# Patient Record
Sex: MALE | Race: WHITE | Employment: OTHER | URBAN - METROPOLITAN AREA
[De-identification: names, ages, dates, MRNs, and addresses within clinical notes are randomized per-mention and may not be internally consistent; named-entity substitution may affect disease eponyms.]

---

## 2017-03-18 ENCOUNTER — HOSPITAL ENCOUNTER (INPATIENT)
Age: 47
LOS: 1 days | Discharge: ANOTHER HOSPITAL | DRG: 282 | End: 2017-03-19
Attending: EMERGENCY MEDICINE | Admitting: INTERNAL MEDICINE
Payer: OTHER GOVERNMENT

## 2017-03-18 ENCOUNTER — APPOINTMENT (OUTPATIENT)
Dept: GENERAL RADIOLOGY | Age: 47
DRG: 282 | End: 2017-03-18
Attending: EMERGENCY MEDICINE
Payer: OTHER GOVERNMENT

## 2017-03-18 DIAGNOSIS — I20.0 UNSTABLE ANGINA (HCC): Primary | ICD-10-CM

## 2017-03-18 DIAGNOSIS — I21.4 NSTEMI (NON-ST ELEVATED MYOCARDIAL INFARCTION) (HCC): ICD-10-CM

## 2017-03-18 LAB
ALBUMIN SERPL BCP-MCNC: 3.3 G/DL (ref 3.5–5)
ALBUMIN/GLOB SERPL: 0.8 {RATIO} (ref 1.1–2.2)
ALP SERPL-CCNC: 105 U/L (ref 45–117)
ALT SERPL-CCNC: 33 U/L (ref 12–78)
ANION GAP BLD CALC-SCNC: 10 MMOL/L (ref 5–15)
APTT PPP: 28.3 SEC (ref 22.1–32.5)
AST SERPL W P-5'-P-CCNC: 37 U/L (ref 15–37)
BASOPHILS # BLD AUTO: 0 K/UL (ref 0–0.1)
BASOPHILS # BLD: 0 % (ref 0–1)
BILIRUB SERPL-MCNC: 0.2 MG/DL (ref 0.2–1)
BNP SERPL-MCNC: 118 PG/ML (ref 0–100)
BUN SERPL-MCNC: 18 MG/DL (ref 6–20)
BUN/CREAT SERPL: 15 (ref 12–20)
CALCIUM SERPL-MCNC: 8.7 MG/DL (ref 8.5–10.1)
CHLORIDE SERPL-SCNC: 103 MMOL/L (ref 97–108)
CK MB CFR SERPL CALC: 4.1 % (ref 0–2.5)
CK MB SERPL-MCNC: 14.2 NG/ML (ref 5–25)
CK SERPL-CCNC: 345 U/L (ref 39–308)
CO2 SERPL-SCNC: 26 MMOL/L (ref 21–32)
CREAT SERPL-MCNC: 1.23 MG/DL (ref 0.7–1.3)
D DIMER PPP FEU-MCNC: 0.22 MG/L FEU (ref 0–0.65)
EOSINOPHIL # BLD: 0.4 K/UL (ref 0–0.4)
EOSINOPHIL NFR BLD: 3 % (ref 0–7)
ERYTHROCYTE [DISTWIDTH] IN BLOOD BY AUTOMATED COUNT: 13.1 % (ref 11.5–14.5)
GLOBULIN SER CALC-MCNC: 4 G/DL (ref 2–4)
GLUCOSE SERPL-MCNC: 311 MG/DL (ref 65–100)
HCT VFR BLD AUTO: 39.6 % (ref 36.6–50.3)
HGB BLD-MCNC: 13.8 G/DL (ref 12.1–17)
INR PPP: 0.9 (ref 0.9–1.1)
LYMPHOCYTES # BLD AUTO: 35 % (ref 12–49)
LYMPHOCYTES # BLD: 4 K/UL (ref 0.8–3.5)
MCH RBC QN AUTO: 32.3 PG (ref 26–34)
MCHC RBC AUTO-ENTMCNC: 34.8 G/DL (ref 30–36.5)
MCV RBC AUTO: 92.7 FL (ref 80–99)
MONOCYTES # BLD: 0.5 K/UL (ref 0–1)
MONOCYTES NFR BLD AUTO: 5 % (ref 5–13)
NEUTS SEG # BLD: 6.4 K/UL (ref 1.8–8)
NEUTS SEG NFR BLD AUTO: 57 % (ref 32–75)
PLATELET # BLD AUTO: 280 K/UL (ref 150–400)
POTASSIUM SERPL-SCNC: 3.9 MMOL/L (ref 3.5–5.1)
PROT SERPL-MCNC: 7.3 G/DL (ref 6.4–8.2)
PROTHROMBIN TIME: 9.2 SEC (ref 9–11.1)
RBC # BLD AUTO: 4.27 M/UL (ref 4.1–5.7)
SODIUM SERPL-SCNC: 139 MMOL/L (ref 136–145)
THERAPEUTIC RANGE,PTTT: NORMAL SECS (ref 58–77)
TROPONIN I SERPL-MCNC: 3.77 NG/ML
WBC # BLD AUTO: 11.4 K/UL (ref 4.1–11.1)

## 2017-03-18 PROCEDURE — 99285 EMERGENCY DEPT VISIT HI MDM: CPT

## 2017-03-18 PROCEDURE — 85025 COMPLETE CBC W/AUTO DIFF WBC: CPT | Performed by: EMERGENCY MEDICINE

## 2017-03-18 PROCEDURE — 74011250636 HC RX REV CODE- 250/636: Performed by: INTERNAL MEDICINE

## 2017-03-18 PROCEDURE — 80053 COMPREHEN METABOLIC PANEL: CPT | Performed by: EMERGENCY MEDICINE

## 2017-03-18 PROCEDURE — 96375 TX/PRO/DX INJ NEW DRUG ADDON: CPT

## 2017-03-18 PROCEDURE — 74011250636 HC RX REV CODE- 250/636: Performed by: EMERGENCY MEDICINE

## 2017-03-18 PROCEDURE — 74011000250 HC RX REV CODE- 250

## 2017-03-18 PROCEDURE — 74011250637 HC RX REV CODE- 250/637: Performed by: EMERGENCY MEDICINE

## 2017-03-18 PROCEDURE — 85610 PROTHROMBIN TIME: CPT | Performed by: EMERGENCY MEDICINE

## 2017-03-18 PROCEDURE — 71020 XR CHEST PA LAT: CPT

## 2017-03-18 PROCEDURE — 82550 ASSAY OF CK (CPK): CPT | Performed by: EMERGENCY MEDICINE

## 2017-03-18 PROCEDURE — 94762 N-INVAS EAR/PLS OXIMTRY CONT: CPT

## 2017-03-18 PROCEDURE — 96365 THER/PROPH/DIAG IV INF INIT: CPT

## 2017-03-18 PROCEDURE — 93005 ELECTROCARDIOGRAM TRACING: CPT

## 2017-03-18 PROCEDURE — 84484 ASSAY OF TROPONIN QUANT: CPT | Performed by: EMERGENCY MEDICINE

## 2017-03-18 PROCEDURE — 83880 ASSAY OF NATRIURETIC PEPTIDE: CPT | Performed by: EMERGENCY MEDICINE

## 2017-03-18 PROCEDURE — 36415 COLL VENOUS BLD VENIPUNCTURE: CPT | Performed by: EMERGENCY MEDICINE

## 2017-03-18 PROCEDURE — 65620000000 HC RM CCU GENERAL

## 2017-03-18 PROCEDURE — 85379 FIBRIN DEGRADATION QUANT: CPT | Performed by: EMERGENCY MEDICINE

## 2017-03-18 PROCEDURE — 74011000250 HC RX REV CODE- 250: Performed by: EMERGENCY MEDICINE

## 2017-03-18 PROCEDURE — 74011000250 HC RX REV CODE- 250: Performed by: INTERNAL MEDICINE

## 2017-03-18 PROCEDURE — 85730 THROMBOPLASTIN TIME PARTIAL: CPT | Performed by: EMERGENCY MEDICINE

## 2017-03-18 RX ORDER — METOPROLOL TARTRATE 5 MG/5ML
5 INJECTION INTRAVENOUS
Status: COMPLETED | OUTPATIENT
Start: 2017-03-18 | End: 2017-03-18

## 2017-03-18 RX ORDER — MORPHINE SULFATE 2 MG/ML
2 INJECTION, SOLUTION INTRAMUSCULAR; INTRAVENOUS
Status: DISCONTINUED | OUTPATIENT
Start: 2017-03-18 | End: 2017-03-19 | Stop reason: HOSPADM

## 2017-03-18 RX ORDER — ENOXAPARIN SODIUM 100 MG/ML
1 INJECTION SUBCUTANEOUS
Status: DISCONTINUED | OUTPATIENT
Start: 2017-03-18 | End: 2017-03-18

## 2017-03-18 RX ORDER — ENOXAPARIN SODIUM 100 MG/ML
1 INJECTION SUBCUTANEOUS EVERY 12 HOURS
Status: DISCONTINUED | OUTPATIENT
Start: 2017-03-18 | End: 2017-03-19 | Stop reason: HOSPADM

## 2017-03-18 RX ORDER — METOPROLOL TARTRATE 5 MG/5ML
INJECTION INTRAVENOUS
Status: COMPLETED
Start: 2017-03-18 | End: 2017-03-18

## 2017-03-18 RX ORDER — NITROGLYCERIN 20 MG/100ML
5-200 INJECTION INTRAVENOUS
Status: DISCONTINUED | OUTPATIENT
Start: 2017-03-18 | End: 2017-03-18 | Stop reason: SDUPTHER

## 2017-03-18 RX ORDER — ASPIRIN 325 MG
325 TABLET ORAL ONCE
Status: COMPLETED | OUTPATIENT
Start: 2017-03-18 | End: 2017-03-18

## 2017-03-18 RX ORDER — NITROGLYCERIN 20 MG/100ML
5-20 INJECTION INTRAVENOUS
Status: DISCONTINUED | OUTPATIENT
Start: 2017-03-18 | End: 2017-03-19 | Stop reason: HOSPADM

## 2017-03-18 RX ORDER — HYDROMORPHONE HYDROCHLORIDE 1 MG/ML
0.5 INJECTION, SOLUTION INTRAMUSCULAR; INTRAVENOUS; SUBCUTANEOUS
Status: COMPLETED | OUTPATIENT
Start: 2017-03-18 | End: 2017-03-18

## 2017-03-18 RX ADMIN — METOPROLOL TARTRATE 5 MG: 5 INJECTION INTRAVENOUS at 22:48

## 2017-03-18 RX ADMIN — NITROGLYCERIN 10 MCG/MIN: 20 INJECTION INTRAVENOUS at 22:18

## 2017-03-18 RX ADMIN — ASPIRIN 325 MG: 325 TABLET ORAL at 22:18

## 2017-03-18 RX ADMIN — NITROGLYCERIN 30 MCG/MIN: 20 INJECTION INTRAVENOUS at 23:06

## 2017-03-18 RX ADMIN — HYDROMORPHONE HYDROCHLORIDE 0.5 MG: 1 INJECTION, SOLUTION INTRAMUSCULAR; INTRAVENOUS; SUBCUTANEOUS at 22:18

## 2017-03-18 RX ADMIN — Medication 2 MG: at 23:06

## 2017-03-18 NOTE — IP AVS SNAPSHOT
Current Discharge Medication List  
  
ASK your doctor about these medications Dose & Instructions Dispensing Information Comments Morning Noon Evening Bedtime  
 aspirin 81 mg chewable tablet Your last dose was: Your next dose is:    
   
   
 Dose:  81 mg Take 81 mg by mouth daily. Refills:  0  
     
   
   
   
  
 atorvastatin 80 mg tablet Commonly known as:  LIPITOR Your last dose was: Your next dose is:    
   
   
 Dose:  80 mg Take 80 mg by mouth daily. Refills:  0  
     
   
   
   
  
 calcium carbonate 200 mg calcium (500 mg) Chew Commonly known as:  TUMS Your last dose was: Your next dose is:    
   
   
 Dose:  2 Tab Take 2 Tabs by mouth as needed. Refills:  0  
     
   
   
   
  
 carvedilol 12.5 mg tablet Commonly known as:  Ozie Bitter Your last dose was: Your next dose is:    
   
   
 Dose:  12.5 mg Take 12.5 mg by mouth two (2) times daily (with meals). Refills:  0  
     
   
   
   
  
 cholecalciferol 1,000 unit Cap Commonly known as:  VITAMIN D3 Your last dose was: Your next dose is:    
   
   
 Dose:  2 Tab Take 2 Tabs by mouth two (2) times a day. Refills:  0  
     
   
   
   
  
 clopidogrel 75 mg Tab Commonly known as:  PLAVIX Your last dose was: Your next dose is:    
   
   
 Dose:  75 mg Take 75 mg by mouth daily. Refills:  0  
     
   
   
   
  
 cyclobenzaprine 10 mg tablet Commonly known as:  FLEXERIL Your last dose was: Your next dose is:    
   
   
 Dose:  10 mg Take 1 tablet by mouth three (3) times daily as needed for Muscle Spasm(s). Quantity:  20 tablet Refills:  0  
     
   
   
   
  
 gabapentin 600 mg tablet Commonly known as:  NEURONTIN Your last dose was: Your next dose is:    
   
   
 Dose:  600 mg Take 600 mg by mouth three (3) times daily.  Indications: NEUROPATHIC PAIN  
 Refills:  0 HYDROcodone-acetaminophen 5-325 mg per tablet Commonly known as:  Juanetta Rasp Your last dose was: Your next dose is:    
   
   
 Dose:  1 Tab Take 1 tablet by mouth every four (4) hours as needed for Pain. Quantity:  20 tablet Refills:  0  
     
   
   
   
  
 hydrOXYzine pamoate 50 mg capsule Commonly known as:  VISTARIL Your last dose was: Your next dose is:    
   
   
 Dose:  50 mg Take 50 mg by mouth every six (6) hours as needed. Refills:  0  
     
   
   
   
  
 ibuprofen 400 mg tablet Commonly known as:  MOTRIN Your last dose was: Your next dose is:    
   
   
 Dose:  400 mg Take 1 tablet by mouth every six (6) hours as needed for Pain. Quantity:  20 tablet Refills:  0  
     
   
   
   
  
 LANTUS 100 unit/mL injection Generic drug:  insulin glargine Your last dose was: Your next dose is:    
   
   
 Dose:  12 Units 12 Units by SubCUTAneous route daily. Indications: takes in the am  
 Refills:  0  
     
   
   
   
  
 lisinopril 10 mg tablet Commonly known as:  Leonila Parrott Your last dose was: Your next dose is:    
   
   
 Dose:  20 mg Take 20 mg by mouth daily. Refills:  0 LORazepam 2 mg tablet Commonly known as:  ATIVAN Your last dose was: Your next dose is:    
   
   
 Dose:  2 mg Take 1 tablet by mouth every eight (8) hours as needed for Anxiety. Quantity:  30 tablet Refills:  0  
     
   
   
   
  
 metoprolol tartrate 25 mg tablet Commonly known as:  LOPRESSOR Your last dose was: Your next dose is:    
   
   
 Dose:  12.5 mg Take 12.5 mg by mouth two (2) times a day. Refills:  0  
     
   
   
   
  
 miconazole 2 % topical powder Commonly known as:  Thomas Granger Your last dose was: Your next dose is:    
   
   
 Apply  to affected area as needed. Refills:  0 nicotinic acid 500 mg tablet Commonly known as:  NIACIN Your last dose was: Your next dose is:    
   
   
 Dose:  500 mg Take 500 mg by mouth Daily (before breakfast). Take one tab by mouth for 30 days then take 2 tabs by mouth at bedtime Refills:  0  
     
   
   
   
  
 nitroglycerin 0.4 mg SL tablet Commonly known as:  NITROSTAT Your last dose was: Your next dose is:    
   
   
 by SubLINGual route every five (5) minutes as needed. Refills:  0 NORVASC 10 mg tablet Generic drug:  amLODIPine Your last dose was: Your next dose is:    
   
   
 Dose:  5 mg Take 5 mg by mouth daily. Refills:  0 NovoLOG Flexpen 100 unit/mL Inpn Generic drug:  insulin aspart Your last dose was: Your next dose is:    
   
   
 Dose:  12 Units 12 Units by SubCUTAneous route Before breakfast, lunch, and dinner. Indications: Gestational Diabetes Mellitus Refills:  0  
     
   
   
   
  
 omeprazole 20 mg capsule Commonly known as:  PRILOSEC Your last dose was: Your next dose is:    
   
   
 Dose:  20 mg Take 20 mg by mouth daily. Refills:  0  
     
   
   
   
  
 raNITIdine 150 mg tablet Commonly known as:  ZANTAC Your last dose was: Your next dose is:    
   
   
 Dose:  150 mg Take 150 mg by mouth nightly. Refills:  0  
     
   
   
   
  
 rosuvastatin 10 mg tablet Commonly known as:  CRESTOR Your last dose was: Your next dose is:    
   
   
 Dose:  10 mg Take 10 mg by mouth nightly. Refills:  0  
     
   
   
   
  
 simvastatin 40 mg tablet Commonly known as:  ZOCOR Your last dose was: Your next dose is:    
   
   
 Dose:  20 mg Take 20 mg by mouth nightly. Refills:  0

## 2017-03-18 NOTE — IP AVS SNAPSHOT
[address] 
[phone] Patient: Lenoard Najjar 
MRN: [id number] You are allergic to the following Allergen Reactions Tramadol Itching Recent Documentation Height Weight BMI Smoking Status 1.727 m 85.2 kg 28.56 kg/m2 Former Smoker Emergency Contacts Name Discharge Info Relation Home Work Mobile Diann Blue DISCHARGE CAREGIVER [3] Friend [5]   [phone] About your hospitalization You were admitted on:  2017 You last received care in the:  [address] You were discharged on:  2017 Unit phone number:  [phone] Why you were hospitalized Your primary diagnosis was:  Not on File Your diagnoses also included:  Nstemi (Non-St Elevated Myocardial Infarction) (Hcc) Providers Seen During Your Hospitalizations Provider Role Specialty Primary office phone Manolo Snyder MD Attending Provider Emergency Medicine [phone] Timothy Nieto MD Attending Provider Cardiology [phone] Your Primary Care Physician (PCP) Primary Care Physician Office Phone Office Fax NONE ** None ** ** None ** Follow-up Information None Current Discharge Medication List  
  
ASK your doctor about these medications Dose & Instructions Dispensing Information Comments Morning Noon Evening Bedtime  
 aspirin 81 mg chewable tablet Your last dose was: Your next dose is:    
   
   
 Dose:  81 mg Take 81 mg by mouth daily. Refills:  0  
     
   
   
   
  
 atorvastatin 80 mg tablet Commonly known as:  LIPITOR Your last dose was: Your next dose is:    
   
   
 Dose:  80 mg Take 80 mg by mouth daily. Refills:  0  
     
   
   
   
  
 calcium carbonate 200 mg calcium (500 mg) Chew Commonly known as:  TUMS Your last dose was: Your next dose is:    
   
   
 Dose:  2 Tab Take 2 Tabs by mouth as needed. Refills:  0  
     
   
   
   
  
 carvedilol 12.5 mg tablet Commonly known as:  Macel Angelo Your last dose was: Your next dose is:    
   
   
 Dose:  12.5 mg Take 12.5 mg by mouth two (2) times daily (with meals). Refills:  0  
     
   
   
   
  
 cholecalciferol 1,000 unit Cap Commonly known as:  VITAMIN D3 Your last dose was: Your next dose is:    
   
   
 Dose:  2 Tab Take 2 Tabs by mouth two (2) times a day. Refills:  0  
     
   
   
   
  
 clopidogrel 75 mg Tab Commonly known as:  PLAVIX Your last dose was: Your next dose is:    
   
   
 Dose:  75 mg Take 75 mg by mouth daily. Refills:  0  
     
   
   
   
  
 cyclobenzaprine 10 mg tablet Commonly known as:  FLEXERIL Your last dose was: Your next dose is:    
   
   
 Dose:  10 mg Take 1 tablet by mouth three (3) times daily as needed for Muscle Spasm(s). Quantity:  20 tablet Refills:  0  
     
   
   
   
  
 gabapentin 600 mg tablet Commonly known as:  NEURONTIN Your last dose was: Your next dose is:    
   
   
 Dose:  600 mg Take 600 mg by mouth three (3) times daily. Indications: NEUROPATHIC PAIN Refills:  0 HYDROcodone-acetaminophen 5-325 mg per tablet Commonly known as:  Benetta Pock Your last dose was: Your next dose is:    
   
   
 Dose:  1 Tab Take 1 tablet by mouth every four (4) hours as needed for Pain. Quantity:  20 tablet Refills:  0  
     
   
   
   
  
 hydrOXYzine pamoate 50 mg capsule Commonly known as:  VISTARIL Your last dose was: Your next dose is:    
   
   
 Dose:  50 mg Take 50 mg by mouth every six (6) hours as needed. Refills:  0  
     
   
   
   
  
 ibuprofen 400 mg tablet Commonly known as:  MOTRIN Your last dose was: Your next dose is: Dose:  400 mg Take 1 tablet by mouth every six (6) hours as needed for Pain. Quantity:  20 tablet Refills:  0  
     
   
   
   
  
 LANTUS 100 unit/mL injection Generic drug:  insulin glargine Your last dose was: Your next dose is:    
   
   
 Dose:  12 Units 12 Units by SubCUTAneous route daily. Indications: takes in the am  
 Refills:  0  
     
   
   
   
  
 lisinopril 10 mg tablet Commonly known as:  Yung Shutters Your last dose was: Your next dose is:    
   
   
 Dose:  20 mg Take 20 mg by mouth daily. Refills:  0 LORazepam 2 mg tablet Commonly known as:  ATIVAN Your last dose was: Your next dose is:    
   
   
 Dose:  2 mg Take 1 tablet by mouth every eight (8) hours as needed for Anxiety. Quantity:  30 tablet Refills:  0  
     
   
   
   
  
 metoprolol tartrate 25 mg tablet Commonly known as:  LOPRESSOR Your last dose was: Your next dose is:    
   
   
 Dose:  12.5 mg Take 12.5 mg by mouth two (2) times a day. Refills:  0  
     
   
   
   
  
 miconazole 2 % topical powder Commonly known as:  Madzaid Nunn Your last dose was: Your next dose is:    
   
   
 Apply  to affected area as needed. Refills:  0  
     
   
   
   
  
 nicotinic acid 500 mg tablet Commonly known as:  NIACIN Your last dose was: Your next dose is:    
   
   
 Dose:  500 mg Take 500 mg by mouth Daily (before breakfast). Take one tab by mouth for 30 days then take 2 tabs by mouth at bedtime Refills:  0  
     
   
   
   
  
 nitroglycerin 0.4 mg SL tablet Commonly known as:  NITROSTAT Your last dose was: Your next dose is:    
   
   
 by SubLINGual route every five (5) minutes as needed. Refills:  0 NORVASC 10 mg tablet Generic drug:  amLODIPine Your last dose was: Your next dose is:    
   
   
 Dose:  5 mg Take 5 mg by mouth daily. Refills:  0 NovoLOG Flexpen 100 unit/mL Inpn Generic drug:  insulin aspart Your last dose was: Your next dose is:    
   
   
 Dose:  12 Units 12 Units by SubCUTAneous route Before breakfast, lunch, and dinner. Indications: Gestational Diabetes Mellitus Refills:  0  
     
   
   
   
  
 omeprazole 20 mg capsule Commonly known as:  PRILOSEC Your last dose was: Your next dose is:    
   
   
 Dose:  20 mg Take 20 mg by mouth daily. Refills:  0  
     
   
   
   
  
 raNITIdine 150 mg tablet Commonly known as:  ZANTAC Your last dose was: Your next dose is:    
   
   
 Dose:  150 mg Take 150 mg by mouth nightly. Refills:  0  
     
   
   
   
  
 rosuvastatin 10 mg tablet Commonly known as:  CRESTOR Your last dose was: Your next dose is:    
   
   
 Dose:  10 mg Take 10 mg by mouth nightly. Refills:  0  
     
   
   
   
  
 simvastatin 40 mg tablet Commonly known as:  ZOCOR Your last dose was: Your next dose is:    
   
   
 Dose:  20 mg Take 20 mg by mouth nightly. Refills:  0 Discharge Instructions None Discharge Orders None Introducing \[facility]\"" & HEALTH SERVICES! Danisha Delgado introduces [employer] patient portal. Now you can access parts of your medical record, email your doctor's office, and request medication refills online. 1. In your internet browser, go to [email or web address] 2. Click on the First Time User? Click Here link in the Sign In box. You will see the New Member Sign Up page. 3. Enter your [employer] Access Code exactly as it appears below. You will not need to use this code after youve completed the sign-up process. If you do not sign up before the expiration date, you must request a new code. · [employer] Access Code: [email or web address] Expires: 6/16/2017 10:10 PM 
 
 4. Enter the last four digits of your Social Security Number (xxxx) and Date of Birth (mm/dd/yyyy) as indicated and click Submit. You will be taken to the next sign-up page. 5. Create a [employer] ID. This will be your [employer] login ID and cannot be changed, so think of one that is secure and easy to remember. 6. Create a [employer] password. You can change your password at any time. 7. Enter your Password Reset Question and Answer. This can be used at a later time if you forget your password. 8. Enter your e-mail address. You will receive e-mail notification when new information is available in [address] 9. Click Sign Up. You can now view and download portions of your medical record. 10. Click the Download Summary menu link to download a portable copy of your medical information. If you have questions, please visit the Frequently Asked Questions section of the [employer] website. Remember, [employer] is NOT to be used for urgent needs. For medical emergencies, dial 911. Now available from your iPhone and Android! General Information Please provide this summary of care documentation to your next provider. Patient Signature:  ____________________________________________________________ Date:  ____________________________________________________________  
  
[place] [facility] Provider Signature:  ____________________________________________________________ Date:  ____________________________________________________________

## 2017-03-18 NOTE — IP AVS SNAPSHOT
Summary of Care Report The Summary of Care report has been created to help improve care coordination. Users with access to [employer] or [address] (Web-based application) may access additional patient information including the Discharge Summary. If you are not currently a [address] user and need more information, please call the number listed below in the [place] [address] section and ask to be connected with Medical Records. Facility Information Name Address Phone [address] [facility] [address] [phone] Patient Information Patient Name Sex DORY Yo ([id number]) Male 1970 Discharge Information Admitting Provider Service Area Unit Amparo Ley MD / [address] [facility] / [phone] Discharge Provider Discharge Date/Time Discharge Disposition Destination (none) (none) (none) (none) Patient Language Language ENGLISH [13] Hospital Problems as of 3/19/2017  Reviewed: 2012  8:25 AM by Richa Yuan MD  
  
  
  
 Class Noted - Resolved Last Modified POA Active Problems NSTEMI (non-ST elevated myocardial infarction) ([facility] [address]  3/18/2017 - Present 3/18/2017 by Amparo Ley MD Unknown Entered by Amparo Ley MD  
  
Non-Hospital Problems as of 3/19/2017  Reviewed: 2012  8:25 AM by Richa Yuan MD  
  
  
  
 Class Noted - Resolved Last Modified Active Problems Chest pain at rest  2012 - Present 2012 Entered by Richa Yuan MD  
  CAD (coronary artery disease)  2012 - Present 2012 Entered by Richa Yuan MD  
  Overview Signed 2012  9:09 AM by Richa Yuan MD  
    [facility] showed 3v cad and had bare metal stent to distal rca. Normal lvef. HTN (hypertension)  2012 - Present 2012 Entered by Dean Caputo MD  
  Hyperlipidemia  2/22/2012 - Present 2/24/2012 Entered by Dean Caputo MD  
  Unstable angina [facility])  2/22/2012 - Present 2/24/2012 Entered by Dean Caputo MD  
  Acute non Q wave MI (myocardial infarction), initial episode of care [facility])  2/23/2012 - Present 2/24/2012 Entered by Dean Caputo MD  
  
You are allergic to the following Allergen Reactions Tramadol Itching Current Discharge Medication List  
  
ASK your doctor about these medications Dose & Instructions Dispensing Information Comments  
 aspirin 81 mg chewable tablet Dose:  81 mg Take 81 mg by mouth daily. Refills:  0  
   
 atorvastatin 80 mg tablet Commonly known as:  LIPITOR Dose:  80 mg Take 80 mg by mouth daily. Refills:  0  
   
 calcium carbonate 200 mg calcium (500 mg) Chew Commonly known as:  TUMS Dose:  2 Tab Take 2 Tabs by mouth as needed. Refills:  0  
   
 carvedilol 12.5 mg tablet Commonly known as:  Deepak Marshal Dose:  12.5 mg Take 12.5 mg by mouth two (2) times daily (with meals). Refills:  0  
   
 cholecalciferol 1,000 unit Cap Commonly known as:  VITAMIN D3 Dose:  2 Tab Take 2 Tabs by mouth two (2) times a day. Refills:  0  
   
 clopidogrel 75 mg Tab Commonly known as:  PLAVIX Dose:  75 mg Take 75 mg by mouth daily. Refills:  0  
   
 cyclobenzaprine 10 mg tablet Commonly known as:  FLEXERIL Dose:  10 mg Take 1 tablet by mouth three (3) times daily as needed for Muscle Spasm(s). Quantity:  20 tablet Refills:  0  
   
 gabapentin 600 mg tablet Commonly known as:  NEURONTIN Dose:  600 mg Take 600 mg by mouth three (3) times daily. Indications: NEUROPATHIC PAIN Refills:  0 HYDROcodone-acetaminophen 5-325 mg per tablet Commonly known as:  Wayna Glaze Dose:  1 Tab Take 1 tablet by mouth every four (4) hours as needed for Pain. Quantity:  20 tablet Refills:  0 hydrOXYzine pamoate 50 mg capsule Commonly known as:  VISTARIL Dose:  50 mg Take 50 mg by mouth every six (6) hours as needed. Refills:  0  
   
 ibuprofen 400 mg tablet Commonly known as:  MOTRIN Dose:  400 mg Take 1 tablet by mouth every six (6) hours as needed for Pain. Quantity:  20 tablet Refills:  0  
   
 LANTUS 100 unit/mL injection Generic drug:  insulin glargine Dose:  12 Units 12 Units by SubCUTAneous route daily. Indications: takes in the am  
 Refills:  0  
   
 lisinopril 10 mg tablet Commonly known as:  Guanako Bridges Dose:  20 mg Take 20 mg by mouth daily. Refills:  0 LORazepam 2 mg tablet Commonly known as:  ATIVAN Dose:  2 mg Take 1 tablet by mouth every eight (8) hours as needed for Anxiety. Quantity:  30 tablet Refills:  0  
   
 metoprolol tartrate 25 mg tablet Commonly known as:  LOPRESSOR Dose:  12.5 mg Take 12.5 mg by mouth two (2) times a day. Refills:  0  
   
 miconazole 2 % topical powder Commonly known as:  Manjula Bridegroom Apply  to affected area as needed. Refills:  0  
   
 nicotinic acid 500 mg tablet Commonly known as:  NIACIN Dose:  500 mg Take 500 mg by mouth Daily (before breakfast). Take one tab by mouth for 30 days then take 2 tabs by mouth at bedtime Refills:  0  
   
 nitroglycerin 0.4 mg SL tablet Commonly known as:  NITROSTAT  
 by SubLINGual route every five (5) minutes as needed. Refills:  0 NORVASC 10 mg tablet Generic drug:  amLODIPine Dose:  5 mg Take 5 mg by mouth daily. Refills:  0 NovoLOG Flexpen 100 unit/mL Inpn Generic drug:  insulin aspart Dose:  12 Units 12 Units by SubCUTAneous route Before breakfast, lunch, and dinner. Indications: Gestational Diabetes Mellitus Refills:  0  
   
 omeprazole 20 mg capsule Commonly known as:  PRILOSEC Dose:  20 mg Take 20 mg by mouth daily. Refills:  0  
   
 raNITIdine 150 mg tablet Commonly known as:  ZANTAC Dose:  150 mg Take 150 mg by mouth nightly. Refills:  0  
   
 rosuvastatin 10 mg tablet Commonly known as:  CRESTOR Dose:  10 mg Take 10 mg by mouth nightly. Refills:  0  
   
 simvastatin 40 mg tablet Commonly known as:  ZOCOR Dose:  20 mg Take 20 mg by mouth nightly. Refills:  0 Current Immunizations Name Date Influenza Vaccine Whole 12/22/2011 Pneumococcal Vaccine (Unspecified Type) 2/22/2012 TD Vaccine 2/22/2000 Tdap 10/9/2014 Follow-up Information None Discharge Instructions None Chart Review Routing History No Routing History on File

## 2017-03-19 VITALS
HEART RATE: 93 BPM | HEIGHT: 68 IN | WEIGHT: 187.83 LBS | BODY MASS INDEX: 28.47 KG/M2 | OXYGEN SATURATION: 95 % | RESPIRATION RATE: 17 BRPM | TEMPERATURE: 98 F | SYSTOLIC BLOOD PRESSURE: 159 MMHG | DIASTOLIC BLOOD PRESSURE: 89 MMHG

## 2017-03-19 LAB
ATRIAL RATE: 89 BPM
CALCULATED P AXIS, ECG09: -18 DEGREES
CALCULATED R AXIS, ECG10: 73 DEGREES
CALCULATED T AXIS, ECG11: 170 DEGREES
DIAGNOSIS, 93000: NORMAL
GLUCOSE BLD STRIP.AUTO-MCNC: 317 MG/DL (ref 65–100)
GLUCOSE BLD STRIP.AUTO-MCNC: 340 MG/DL (ref 65–100)
P-R INTERVAL, ECG05: 160 MS
Q-T INTERVAL, ECG07: 380 MS
QRS DURATION, ECG06: 82 MS
QTC CALCULATION (BEZET), ECG08: 462 MS
SERVICE CMNT-IMP: ABNORMAL
SERVICE CMNT-IMP: ABNORMAL
TROPONIN I SERPL-MCNC: 3.45 NG/ML
TROPONIN I SERPL-MCNC: 3.59 NG/ML
VENTRICULAR RATE, ECG03: 89 BPM

## 2017-03-19 PROCEDURE — 36415 COLL VENOUS BLD VENIPUNCTURE: CPT | Performed by: INTERNAL MEDICINE

## 2017-03-19 PROCEDURE — 74011250636 HC RX REV CODE- 250/636: Performed by: INTERNAL MEDICINE

## 2017-03-19 PROCEDURE — 82962 GLUCOSE BLOOD TEST: CPT

## 2017-03-19 PROCEDURE — 84484 ASSAY OF TROPONIN QUANT: CPT | Performed by: INTERNAL MEDICINE

## 2017-03-19 PROCEDURE — 93005 ELECTROCARDIOGRAM TRACING: CPT

## 2017-03-19 PROCEDURE — 74011250637 HC RX REV CODE- 250/637: Performed by: INTERNAL MEDICINE

## 2017-03-19 RX ORDER — GUAIFENESIN 100 MG/5ML
81 LIQUID (ML) ORAL DAILY
Status: DISCONTINUED | OUTPATIENT
Start: 2017-03-19 | End: 2017-03-19 | Stop reason: HOSPADM

## 2017-03-19 RX ORDER — AMLODIPINE BESYLATE 5 MG/1
5 TABLET ORAL DAILY
Status: DISCONTINUED | OUTPATIENT
Start: 2017-03-19 | End: 2017-03-19

## 2017-03-19 RX ORDER — CYCLOBENZAPRINE HCL 10 MG
10 TABLET ORAL
Status: DISCONTINUED | OUTPATIENT
Start: 2017-03-19 | End: 2017-03-19 | Stop reason: HOSPADM

## 2017-03-19 RX ORDER — CALCIUM CARBONATE 200(500)MG
400 TABLET,CHEWABLE ORAL
Status: DISCONTINUED | OUTPATIENT
Start: 2017-03-19 | End: 2017-03-19

## 2017-03-19 RX ORDER — SODIUM CHLORIDE 0.9 % (FLUSH) 0.9 %
5-10 SYRINGE (ML) INJECTION EVERY 8 HOURS
Status: DISCONTINUED | OUTPATIENT
Start: 2017-03-19 | End: 2017-03-19 | Stop reason: HOSPADM

## 2017-03-19 RX ORDER — INSULIN ASPART 100 [IU]/ML
18 INJECTION, SOLUTION INTRAVENOUS; SUBCUTANEOUS
COMMUNITY

## 2017-03-19 RX ORDER — NITROGLYCERIN 0.4 MG/1
0.4 TABLET SUBLINGUAL
Status: DISCONTINUED | OUTPATIENT
Start: 2017-03-19 | End: 2017-03-19 | Stop reason: HOSPADM

## 2017-03-19 RX ORDER — LISINOPRIL 5 MG/1
5 TABLET ORAL DAILY
Status: DISCONTINUED | OUTPATIENT
Start: 2017-03-19 | End: 2017-03-19

## 2017-03-19 RX ORDER — MORPHINE SULFATE 2 MG/ML
2 INJECTION, SOLUTION INTRAMUSCULAR; INTRAVENOUS ONCE
Status: COMPLETED | OUTPATIENT
Start: 2017-03-19 | End: 2017-03-19

## 2017-03-19 RX ORDER — IBUPROFEN 400 MG/1
400 TABLET ORAL
Status: DISCONTINUED | OUTPATIENT
Start: 2017-03-19 | End: 2017-03-19 | Stop reason: HOSPADM

## 2017-03-19 RX ORDER — MELATONIN
2000 2 TIMES DAILY
Status: DISCONTINUED | OUTPATIENT
Start: 2017-03-19 | End: 2017-03-19

## 2017-03-19 RX ORDER — FAMOTIDINE 20 MG/1
20 TABLET, FILM COATED ORAL
Status: DISCONTINUED | OUTPATIENT
Start: 2017-03-19 | End: 2017-03-19 | Stop reason: HOSPADM

## 2017-03-19 RX ORDER — INSULIN GLARGINE 100 [IU]/ML
22 INJECTION, SOLUTION SUBCUTANEOUS DAILY
Status: ON HOLD | COMMUNITY
End: 2020-03-13

## 2017-03-19 RX ORDER — LORAZEPAM 1 MG/1
2 TABLET ORAL
Status: DISCONTINUED | OUTPATIENT
Start: 2017-03-19 | End: 2017-03-19 | Stop reason: HOSPADM

## 2017-03-19 RX ORDER — HYDROXYZINE 25 MG/1
50 TABLET, FILM COATED ORAL
Status: DISCONTINUED | OUTPATIENT
Start: 2017-03-19 | End: 2017-03-19 | Stop reason: HOSPADM

## 2017-03-19 RX ORDER — SIMVASTATIN 20 MG/1
20 TABLET, FILM COATED ORAL
Status: DISCONTINUED | OUTPATIENT
Start: 2017-03-19 | End: 2017-03-19

## 2017-03-19 RX ORDER — LISINOPRIL 10 MG/1
10 TABLET ORAL DAILY
Status: DISCONTINUED | OUTPATIENT
Start: 2017-03-19 | End: 2017-03-19

## 2017-03-19 RX ORDER — CARVEDILOL 12.5 MG/1
25 TABLET ORAL 2 TIMES DAILY WITH MEALS
Status: ON HOLD | COMMUNITY
End: 2020-03-13

## 2017-03-19 RX ORDER — PANTOPRAZOLE SODIUM 40 MG/1
40 TABLET, DELAYED RELEASE ORAL DAILY
Status: DISCONTINUED | OUTPATIENT
Start: 2017-03-19 | End: 2017-03-19

## 2017-03-19 RX ORDER — ROSUVASTATIN CALCIUM 10 MG/1
10 TABLET, COATED ORAL
Status: DISCONTINUED | OUTPATIENT
Start: 2017-03-19 | End: 2017-03-19

## 2017-03-19 RX ORDER — INSULIN LISPRO 100 [IU]/ML
INJECTION, SOLUTION INTRAVENOUS; SUBCUTANEOUS
Status: DISCONTINUED | OUTPATIENT
Start: 2017-03-19 | End: 2017-03-19 | Stop reason: HOSPADM

## 2017-03-19 RX ORDER — HYDROCODONE BITARTRATE AND ACETAMINOPHEN 5; 325 MG/1; MG/1
1 TABLET ORAL
Status: DISCONTINUED | OUTPATIENT
Start: 2017-03-19 | End: 2017-03-19 | Stop reason: HOSPADM

## 2017-03-19 RX ORDER — DEXTROSE 50 % IN WATER (D50W) INTRAVENOUS SYRINGE
12.5-25 AS NEEDED
Status: DISCONTINUED | OUTPATIENT
Start: 2017-03-19 | End: 2017-03-19 | Stop reason: HOSPADM

## 2017-03-19 RX ORDER — ATORVASTATIN CALCIUM 80 MG/1
80 TABLET, FILM COATED ORAL DAILY
COMMUNITY
End: 2018-02-06

## 2017-03-19 RX ORDER — CLOPIDOGREL BISULFATE 75 MG/1
75 TABLET ORAL DAILY
Status: DISCONTINUED | OUTPATIENT
Start: 2017-03-19 | End: 2017-03-19 | Stop reason: HOSPADM

## 2017-03-19 RX ORDER — MAGNESIUM SULFATE 100 %
4 CRYSTALS MISCELLANEOUS AS NEEDED
Status: DISCONTINUED | OUTPATIENT
Start: 2017-03-19 | End: 2017-03-19 | Stop reason: HOSPADM

## 2017-03-19 RX ORDER — LANOLIN ALCOHOL/MO/W.PET/CERES
500 CREAM (GRAM) TOPICAL
Status: DISCONTINUED | OUTPATIENT
Start: 2017-03-19 | End: 2017-03-19

## 2017-03-19 RX ORDER — INSULIN GLARGINE 100 [IU]/ML
12 INJECTION, SOLUTION SUBCUTANEOUS DAILY
Status: DISCONTINUED | OUTPATIENT
Start: 2017-03-19 | End: 2017-03-19 | Stop reason: HOSPADM

## 2017-03-19 RX ORDER — SODIUM CHLORIDE 0.9 % (FLUSH) 0.9 %
5-10 SYRINGE (ML) INJECTION AS NEEDED
Status: DISCONTINUED | OUTPATIENT
Start: 2017-03-19 | End: 2017-03-19 | Stop reason: HOSPADM

## 2017-03-19 RX ORDER — METOPROLOL TARTRATE 25 MG/1
12.5 TABLET, FILM COATED ORAL 2 TIMES DAILY
Status: DISCONTINUED | OUTPATIENT
Start: 2017-03-19 | End: 2017-03-19

## 2017-03-19 RX ORDER — GABAPENTIN 600 MG/1
600 TABLET ORAL 3 TIMES DAILY
Status: ON HOLD | COMMUNITY
End: 2020-03-13

## 2017-03-19 RX ORDER — LISINOPRIL 10 MG/1
10 TABLET ORAL DAILY
Status: DISCONTINUED | OUTPATIENT
Start: 2017-03-19 | End: 2017-03-19 | Stop reason: HOSPADM

## 2017-03-19 RX ORDER — ATORVASTATIN CALCIUM 40 MG/1
80 TABLET, FILM COATED ORAL
Status: DISCONTINUED | OUTPATIENT
Start: 2017-03-19 | End: 2017-03-19 | Stop reason: HOSPADM

## 2017-03-19 RX ADMIN — INSULIN GLARGINE 12 UNITS: 100 INJECTION, SOLUTION SUBCUTANEOUS at 08:19

## 2017-03-19 RX ADMIN — CLOPIDOGREL BISULFATE 75 MG: 75 TABLET ORAL at 08:44

## 2017-03-19 RX ADMIN — Medication 2 MG: at 06:54

## 2017-03-19 RX ADMIN — ASPIRIN 81 MG CHEWABLE TABLET 81 MG: 81 TABLET CHEWABLE at 08:43

## 2017-03-19 RX ADMIN — MORPHINE SULFATE 2 MG: 2 INJECTION, SOLUTION INTRAMUSCULAR; INTRAVENOUS at 07:57

## 2017-03-19 RX ADMIN — INSULIN LISPRO 7 UNITS: 100 INJECTION, SOLUTION INTRAVENOUS; SUBCUTANEOUS at 08:44

## 2017-03-19 RX ADMIN — FAMOTIDINE 20 MG: 20 TABLET, FILM COATED ORAL at 01:37

## 2017-03-19 RX ADMIN — LISINOPRIL 10 MG: 10 TABLET ORAL at 08:44

## 2017-03-19 RX ADMIN — ENOXAPARIN SODIUM 90 MG: 100 INJECTION SUBCUTANEOUS at 11:58

## 2017-03-19 RX ADMIN — INSULIN LISPRO 7 UNITS: 100 INJECTION, SOLUTION INTRAVENOUS; SUBCUTANEOUS at 11:58

## 2017-03-19 RX ADMIN — Medication 2 MG: at 12:02

## 2017-03-19 RX ADMIN — Medication 10 ML: at 07:59

## 2017-03-19 RX ADMIN — LORAZEPAM 2 MG: 1 TABLET ORAL at 09:24

## 2017-03-19 RX ADMIN — NITROGLYCERIN 2 INCH: 20 OINTMENT TOPICAL at 08:45

## 2017-03-19 RX ADMIN — ENOXAPARIN SODIUM 90 MG: 100 INJECTION SUBCUTANEOUS at 00:53

## 2017-03-19 RX ADMIN — HYDROCODONE BITARTRATE AND ACETAMINOPHEN 1 TABLET: 5; 325 TABLET ORAL at 01:37

## 2017-03-19 NOTE — PROGRESS NOTES
Cardiology Progress Note                                        Admit Date: 3/18/2017    Assessment/Plan:   NSTEMI; troponin is coming down slightly; clinically his CP is better; he needs cardiac cath in am; he is a  and we could conceivably transfer pt to [facility]  CAD; s/p CABG; suspect graft closure    Miguelina Scott is a 40 y.o. male with     PROBLEM LIST:  Patient Active Problem List    Diagnosis Date Noted    NSTEMI (non-ST elevated myocardial infarction) ([facility] 03/18/2017    Acute non Q wave MI (myocardial infarction), initial episode of care ([facility] 02/23/2012    Chest pain at rest 02/22/2012    CAD (coronary artery disease) 02/22/2012    HTN (hypertension) 02/22/2012    Hyperlipidemia 02/22/2012    Unstable angina ([facility] 02/22/2012         Subjective:     Miguelina Scott reports none. Visit Vitals    BP 97/61 (BP 1 Location: Left arm, BP Patient Position: At rest)    Pulse 78    Temp 98 °F (36.7 °C)    Resp 19    Ht 5' 8\" (1.727 m)    Wt 85.2 kg (187 lb 13.3 oz)    SpO2 95%    BMI 28.56 kg/m2       Intake/Output Summary (Last 24 hours) at 03/19/17 1937  Last data filed at 03/19/17 0600   Gross per 24 hour   Intake            162.4 ml   Output                0 ml   Net            162.4 ml       Objective:      Physical Exam:  HEENT: Perrla, EOMI  Neck: No JVD,  No thyroidmegaly  Resp: CTA bilaterally;  No wheezes or rales  CV: RRR s1s2 No murmur no s3  Abd:Soft, Nontender  Ext: No edema  Neuro: Alert and oriented; Nonfocal  Skin: Warm, Dry, Intact  Pulses: 2+ DP/PT/Rad      Telemetry: normal sinus rhythm    Current Facility-Administered Medications   Medication Dose Route Frequency    pantoprazole (PROTONIX) tablet 40 mg  40 mg Oral DAILY    simvastatin (ZOCOR) tablet 20 mg  20 mg Oral QHS    clopidogrel (PLAVIX) tablet 75 mg  75 mg Oral DAILY    hydrOXYzine HCl (ATARAX) tablet 50 mg  50 mg Oral Q6H PRN    metoprolol tartrate (LOPRESSOR) tablet 12.5 mg  12.5 mg Oral BID    cholecalciferol (VITAMIN D3) tablet 2,000 Units  2,000 Units Oral BID    . PHARMACY TO SUBSTITUTE PER PROTOCOL    Per Protocol    famotidine (PEPCID) tablet 20 mg  20 mg Oral QHS    niacin SR (NIACIN SR) capsule 500 mg  500 mg Oral ACB    amLODIPine (NORVASC) tablet 5 mg  5 mg Oral DAILY    aspirin chewable tablet 81 mg  81 mg Oral DAILY    nitroglycerin (NITROSTAT) tablet 0.4 mg  0.4 mg SubLINGual Q5MIN PRN    calcium carbonate (TUMS) chewable tablet 400 mg [elemental]  400 mg Oral TID WITH MEALS    HYDROcodone-acetaminophen (NORCO) 5-325 mg per tablet 1 Tab  1 Tab Oral Q4H PRN    ibuprofen (MOTRIN) tablet 400 mg  400 mg Oral Q6H PRN    cyclobenzaprine (FLEXERIL) tablet 10 mg  10 mg Oral TID PRN    LORazepam (ATIVAN) tablet 2 mg  2 mg Oral Q8H PRN    lisinopril (PRINIVIL, ZESTRIL) tablet 5 mg  5 mg Oral DAILY    nitroglycerin (Tridil) 200 mcg/ml infusion  5-20 mcg/min IntraVENous TITRATE    enoxaparin (LOVENOX) injection 90 mg  1 mg/kg SubCUTAneous Q12H    morphine injection 2 mg  2 mg IntraVENous Q4H PRN         Data Review:   Labs:    Recent Results (from the past 24 hour(s))   TROPONIN I    Collection Time: 03/18/17 10:00 PM   Result Value Ref Range    Troponin-I, Qt. 3.77 (H) <0.05 ng/mL   CBC WITH AUTOMATED DIFF    Collection Time: 03/18/17 10:00 PM   Result Value Ref Range    WBC 11.4 (H) 4.1 - 11.1 K/uL    RBC 4.27 4. 10 - 5.70 M/uL    HGB 13.8 12.1 - 17.0 g/dL    HCT 39.6 36.6 - 50.3 %    MCV 92.7 80.0 - 99.0 FL    MCH 32.3 26.0 - 34.0 PG    MCHC 34.8 30.0 - 36.5 g/dL    RDW 13.1 11.5 - 14.5 %    PLATELET 619 461 - 813 K/uL    NEUTROPHILS 57 32 - 75 %    LYMPHOCYTES 35 12 - 49 %    MONOCYTES 5 5 - 13 %    EOSINOPHILS 3 0 - 7 %    BASOPHILS 0 0 - 1 %    ABS. NEUTROPHILS 6.4 1.8 - 8.0 K/UL    ABS. LYMPHOCYTES 4.0 (H) 0.8 - 3.5 K/UL    ABS. MONOCYTES 0.5 0.0 - 1.0 K/UL    ABS. EOSINOPHILS 0.4 0.0 - 0.4 K/UL    ABS.  BASOPHILS 0.0 0.0 - 0.1 K/UL   METABOLIC PANEL, COMPREHENSIVE    Collection Time: 03/18/17 10:00 PM Result Value Ref Range    Sodium 139 136 - 145 mmol/L    Potassium 3.9 3.5 - 5.1 mmol/L    Chloride 103 97 - 108 mmol/L    CO2 26 21 - 32 mmol/L    Anion gap 10 5 - 15 mmol/L    Glucose 311 (H) 65 - 100 mg/dL    BUN 18 6 - 20 MG/DL    Creatinine 1.23 0.70 - 1.30 MG/DL    BUN/Creatinine ratio 15 12 - 20      GFR est AA >60 >60 ml/min/1.73m2    GFR est non-AA >60 >60 ml/min/1.73m2    Calcium 8.7 8.5 - 10.1 MG/DL    Bilirubin, total 0.2 0.2 - 1.0 MG/DL    ALT (SGPT) 33 12 - 78 U/L    AST (SGOT) 37 15 - 37 U/L    Alk.  phosphatase 105 45 - 117 U/L    Protein, total 7.3 6.4 - 8.2 g/dL    Albumin 3.3 (L) 3.5 - 5.0 g/dL    Globulin 4.0 2.0 - 4.0 g/dL    A-G Ratio 0.8 (L) 1.1 - 2.2     BNP    Collection Time: 03/18/17 10:00 PM   Result Value Ref Range     (H) 0 - 100 pg/mL   CK W/ CKMB & INDEX    Collection Time: 03/18/17 10:00 PM   Result Value Ref Range     (H) 39 - 308 U/L    CK - MB 14.2 (H) <3.6 NG/ML    CK-MB Index 4.1 (H) 0 - 2.5     D DIMER    Collection Time: 03/18/17 10:00 PM   Result Value Ref Range    D-dimer 0.22 0.00 - 0.65 mg/L FEU   PROTHROMBIN TIME + INR    Collection Time: 03/18/17 10:00 PM   Result Value Ref Range    INR 0.9 0.9 - 1.1      Prothrombin time 9.2 9.0 - 11.1 sec   PTT    Collection Time: 03/18/17 10:00 PM   Result Value Ref Range    aPTT 28.3 22.1 - 32.5 sec    aPTT, therapeutic range     58.0 - 77.0 SECS   EKG, 12 LEAD, INITIAL    Collection Time: 03/18/17 10:00 PM   Result Value Ref Range    Ventricular Rate 89 BPM    Atrial Rate 89 BPM    P-R Interval 160 ms    QRS Duration 82 ms    Q-T Interval 380 ms    QTC Calculation (Bezet) 462 ms    Calculated P Axis -18 degrees    Calculated R Axis 73 degrees    Calculated T Axis 170 degrees    Diagnosis       Normal sinus rhythm  Inferior infarct , age undetermined  ST & T wave abnormality, consider lateral ischemia  When compared with ECG of 08-NOV-2014 07:39,  Inferior infarct is now present  ST elevation now present in Inferior leads  T wave inversion now evident in Lateral leads     TROPONIN I    Collection Time: 03/19/17  1:32 AM   Result Value Ref Range    Troponin-I, Qt. 3.59 (H) <0.05 ng/mL

## 2017-03-19 NOTE — ED TRIAGE NOTES
TRIAGE NOTE: Pt arrives via EMS for c/o CP that began 2 days ago. Pt reports taking 6 nitro tablets tonight with minimal relief but the pain always returns. Also took 325mg aspirin. Pt reports dizziness, incontinence and shaking during episode of chest pain last night.

## 2017-03-19 NOTE — PROGRESS NOTES
This 39 yo WM  who presented to us with NSTEMI with ongoing CP and elevated BPs. His first troponin was 3.77. His CP eased considerably on IV NTG drip and increased BB and Lovenox. His troponins came down to 3.45 this am. His ekgs continue to show midl ST depression in inferolateral leads. His vital signs have been very stable and no further CP this am    His past cardiac data include CAD, s/p 3 vs CABG in 2011 at [facility] and stent in one of the grafts in 2012. Last echo showed preserved EF here. He will be transferred to [facility] for further care and probably needs to undergo a cardiac cath.

## 2017-03-19 NOTE — PROGRESS NOTES
TRANSFER - IN REPORT:  Verbal report received from Jing on Sherrill Flow  being received from ED for routine progression of care    Report consisted of patients Situation, Background, Assessment and   Recommendations(SBAR). Information from the following report(s) SBAR, Kardex, ED Summary, OR Summary, Procedure Summary, Intake/Output, MAR, Accordion, Recent Results and Med Rec Status was reviewed with the receiving nurse. Opportunity for questions and clarification was provided. Assessment completed upon patients arrival to unit and care assumed. Primary Nurse Aracelis Beck RN and Carlene Corona RN performed a dual skin assessment on this patient No impairment noted  Marquis score is 23      0115 patient to CCU 19 on nitro infusing at 30 mcg/min. Provided CHG bath, completed database, labs drawn, PTA medications were verified (orders were placed based on old mediations from 2012-will need to reconcile mediation in the am). Patient is normally treated at the [facility]. He called his MD today and reported chest pain. Was instructed to go to ER then transfer to [facility]. Advised I will pass along the info in the morning to determine plan of care. provided teach info regarding MI, CHF, DM. Patient needs reinforcement. 0140-Patient c/o chest and back pain-patient asked for morphine-provided norco and increased nitro drip to 50 mcg/min  0220-patient sleeping VSS  0400-no issues  0600-no issues    Bedside shift change report given to Gina  (oncoming nurse) by Johan Ceballos (offgoing nurse). Report included the following information SBAR, Kardex, ED Summary, OR Summary, Procedure Summary, Intake/Output, MAR, Accordion, Recent Results and Med Rec Status.

## 2017-03-19 NOTE — PROGRESS NOTES
1045: Waiting to hear back from [facility] regarding bed placement. Contact is Marco, [phone]. Dr. Kevyn Arce waiting to receive call from accepting cardiologist at [facility].  [phone]: Dr. Rubia Chowdhury, second year cardiology fellow, will be accepting physician at [facility]. He has spoken to Dr. Kevyn Arce. Awaiting bed assignment and transport set-up.

## 2017-03-19 NOTE — PROGRESS NOTES
Problem: Heart Failure: Day 1  Goal: Activity/Safety  Outcome: Progressing Towards Goal  Bedrest             Goal: Consults, if ordered  Outcome: Progressing Towards Goal  Consult to cardiology and troponin  Goal: Nutrition/Diet  Outcome: Progressing Towards Goal  NPO will verify if patient is going to cath lab in the am or transferring to the [facility]

## 2017-03-19 NOTE — PROGRESS NOTES
Received consult for pt request to transfer to [facility].  CM spok [facility] transfer center staff, phone [phone], Ezequiel Ramirez and received paperwork to complete. CM completed paperwork, met with pt at bedside to review, and pt signed. Also faxed completed paperwork and clinicals to [facility] transfer center fax [phone]. Process is for Dr. Osiris Rodriguez to receive call from [facility] physician regarding clinical needs. Pt in CCU, but tele overflow bed. Will follow. LEVAR Fairbanks    Care Management Interventions  Transition of Care Consult (CM Consult): Other (transfer to [facility])      8:40am  Yudy Garcia has received paperwork. CM confirmed with Dr. Osiris Rodriguez pt still requires ICU bed. LEVAR Fairbanks    12:30pm  Received call from Beaman at [place] confirming acceptance for transfer and bed availability for today. She is sending [facility] ambulance to transport pt from CCU to [facility] [place].  Pt aware and in agreement. EMTALA, summary, kardex, and MAR to travel with pt.     LEVAR Fairbanks

## 2017-03-19 NOTE — ED PROVIDER NOTES
HPI Comments: 40 y.o. male with past medical history significant for MI, Endocarditis, DM, HTN who presents from home via EMS with chief complaint of chest pain. Pt reports 2 day hx of \"aching\" chest pain. He describes pain as constant with intermittent \"severe\" exacerbations. Pain also exacerbated with exertion. He notes the use of Nitro throughout the last two days without significant relief. Pt also reports while taking out the trash yesterday, he had a severe episode of chest pain at which time she became acutely dizzy, incontinent, \"I didn't know where I was\" and fell to the ground. No further symptoms after this episode. Pt denies injury from this fall. Pt further denies SOB, diaphoresis, nausea, vomiting, diarrhea, constipation, fever, abdominal pain, LOC, dizziness, urinary symptoms, neck pain or back pain. There are no other acute medical concerns at this time. Old chart review: Pt admitted 2/23/12 for acute coronary syndrome. Pt with reporte chest pain at rest. \"Small non-q MI with preserved lV EF stable on IV medications\". Pt transfered to the [place]. Social hx: Previous smoker (\"less than 1 ppd\")  Cardiology: Followed by Cardiolgy at the [facility] \"Dr. Johanna Rock"     Note written by Armida Zepeda, as dictated by Renee Arguello MD 10:36 PM      The history is provided by the patient. No  was used.         Past Medical History:   Diagnosis Date    CAD (coronary artery disease)     Diabetes ([address]     \"prediabetes\" Was suppos to go clinic at  [facility] [address] Endocarditis     Hypertension     [facility] infarction) [facility]        Past Surgical History:   Procedure Laterality Date    CARDIAC SURG PROCEDURE UNLIST      cath with stent placement feb 2012    CARDIAC SURG PROCEDURE UNLIST      CABGx4         Family History:   Problem Relation Age of Onset    Stroke Father        Social History     Social History    Marital status: SINGLE     Spouse name: N/A    Number of children: N/A    Years of education: N/A     Occupational History    Not on file. Social History Main Topics    Smoking status: Former Smoker     Quit date: 1/19/2012    Smokeless tobacco: Never Used    Alcohol use Yes      Comment: occassionl    Drug use: No      Comment: quit maant about 2 weeks ago/ prev use cocaine, heroin IV    Sexual activity: Not Currently     Partners: Female     Other Topics Concern    Not on file     Social History Narrative         ALLERGIES: Tramadol    Review of Systems   Constitutional: Negative for diaphoresis and fever. Respiratory: Negative for shortness of breath. Cardiovascular: Positive for chest pain. Gastrointestinal: Negative for abdominal pain, constipation, diarrhea, nausea and vomiting. Genitourinary: Negative for difficulty urinating, dysuria, enuresis and frequency. Musculoskeletal: Negative for back pain and neck pain. Neurological: Negative for dizziness (resolved). All other systems reviewed and are negative. Vitals:    03/18/17 2155   BP: 136/90   Pulse: 92   Resp: 14   Temp: 98.5 °F (36.9 °C)   SpO2: 95%   Weight: 92.7 kg (204 lb 4.8 oz)   Height: 5' 8\" (1.727 m)            Physical Exam   Nursing note and vitals reviewed. CONSTITUTIONAL: Well-appearing; well-nourished; in no apparent distress  HEAD: Normocephalic; atraumatic  EYES: PERRL; EOM intact; conjunctiva and sclera are clear bilaterally. ENT: No rhinorrhea; normal pharynx with no tonsillar hypertrophy; mucous membranes pink/moist, no erythema, no exudate. NECK: Supple; non-tender; no cervical lymphadenopathy  CARD: Normal S1, S2; no murmurs, rubs, or gallops. Regular rate and rhythm. RESP: Normal respiratory effort; breath sounds clear and equal bilaterally; no wheezes, rhonchi, or rales. ABD: Normal bowel sounds; non-distended; non-tender; no palpable organomegaly, no masses, no bruits.   Back Exam: Normal inspection; no vertebral point tenderness, no CVA tenderness. Normal range of motion. EXT: Normal ROM in all four extremities; non-tender to palpation; no swelling or deformity; distal pulses are normal, no edema. SKIN: Warm; dry; no rash. NEURO:Alert and oriented x 3, coherent, SCOTT-XII grossly intact, sensory and motor are non-focal.        MDM  Number of Diagnoses or Management Options  Unstable angina [facility]):   Diagnosis management comments: Assessment: 43-year-old male who presents to the chest pain x2 days and relief with nitroglycerin, worse with exertion. It is witnessed diaphoresis nausea, dizziness lightheadedness. He has moderate to severe risk factors for unstable angina. Plan: Lab/ EKG/ chest x-ray/ aspirin/ nitroglycerin/ Lovenox/ consult cardiology/ monitoring and reevaluate         Amount and/or Complexity of Data Reviewed  Clinical lab tests: ordered and reviewed  Tests in the radiology section of CPT®: ordered and reviewed  Tests in the medicine section of CPT®: reviewed and ordered  Discussion of test results with the performing providers: yes  Decide to obtain previous medical records or to obtain history from someone other than the patient: yes  Obtain history from someone other than the patient: yes  Review and summarize past medical records: yes  Discuss the patient with other providers: yes  Independent visualization of images, tracings, or specimens: yes    Risk of Complications, Morbidity, and/or Mortality  Presenting problems: moderate  Diagnostic procedures: moderate  Management options: moderate    Critical Care  Total time providing critical care: (Total critical care time spend exclusive of procedures: 35 minutes)    ED Course       Procedures     ED EKG interpretation:  Rhythm: normal sinus rhythm; and regular . Rate (approx.): 89; Axis: left axis deviation; P wave: normal; QRS interval: normal ; ST/T wave: depression in lateral lead; in  Lead: Lateral lead; Other findings: abnormal ekg.  This EKG was interpreted by Bruna Shabazz Pilar Sandoval MD,ED Provider. XRAY INTERPRETATION (ED MD)  Chest Xray  No acute process seen. Normal heart size. No bony abnormalities. No infiltrate. Amairani Kaiser MD 10:01 PM    PROGRESS NOTE:  Pt has been reexamined by Amairani Kaiser MD all available results have been reviewed with pt and any available family. Pt understands sx, dx, and tx in ED. Care plan has been outlined and questions have been answered. Pt and any available family understands and agrees to need for admission to hospital for further tx not available in ED. Pt is ready for admission.     Written by Amairani Kaiser MD,  10:14 PM    .

## 2017-03-19 NOTE — PROGRESS NOTES
0730- Bedside report received from [facility]. Pt complaining of 6/10 pain. 2mg Morphine administered. 1030- waiting to hear from [place] cardiologist on status of transfer   12:36 PM  Transport arrived to take pt to [place]  1:02 PM  TRANSFER - OUT REPORT:    Verbal report given to Carey Patel RN(name) on Cesilia Eugene  [facility]  Report consisted of patients Situation, Background, Assessment and   Recommendations(SBAR). Information from the following report(s) SBAR, ED Summary, Intake/Output and Recent Results was reviewed with the receiving nurse. Lines:   Peripheral IV 03/18/17 Left Antecubital (Active)   Site Assessment Clean, dry, & intact 3/19/2017  8:00 AM   Phlebitis Assessment 0 3/19/2017  8:00 AM   Infiltration Assessment 0 3/19/2017  8:00 AM   Dressing Status Clean, dry, & intact 3/19/2017  8:00 AM   Dressing Type Transparent 3/19/2017  8:00 AM   Hub Color/Line Status Pink;Capped 3/19/2017  8:00 AM   Action Taken Open ports on tubing capped 3/19/2017  1:12 AM   Alcohol Cap Used Yes 3/19/2017  8:00 AM       Peripheral IV 03/19/17 Right Antecubital (Active)   Site Assessment Clean, dry, & intact 3/19/2017  8:00 AM   Phlebitis Assessment 0 3/19/2017  8:00 AM   Infiltration Assessment 0 3/19/2017  8:00 AM   Dressing Status Clean, dry, & intact 3/19/2017  8:00 AM   Dressing Type Transparent 3/19/2017  8:00 AM   Hub Color/Line Status Pink;Capped 3/19/2017  8:00 AM   Alcohol Cap Used Yes 3/19/2017  8:00 AM        Opportunity for questions and clarification was provided.       Patient transported with:   EMS

## 2017-03-19 NOTE — PROGRESS NOTES
Problem: Patient Education: Go to Patient Education Activity  Goal: Patient/Family Education  Patient educated on smoking cessation.

## 2017-03-19 NOTE — ROUTINE PROCESS
TRANSFER - OUT REPORT:    Verbal report given to Ursula Navarrete RN (name) on Char Johnson  being transferred to CCU (unit) for routine progression of care       Report consisted of patients Situation, Background, Assessment and   Recommendations(SBAR). Information from the following report(s) SBAR, ED Summary, [facility] and Recent Results was reviewed with the receiving nurse. Lines:   Peripheral IV 03/18/17 Left Antecubital (Active)   Site Assessment Clean, dry, & intact 3/18/2017 10:49 PM   Phlebitis Assessment 0 3/18/2017 10:49 PM   Infiltration Assessment 0 3/18/2017 10:49 PM   Dressing Status Clean, dry, & intact 3/18/2017 10:49 PM   Dressing Type Transparent 3/18/2017 10:49 PM   Hub Color/Line Status Pink;Flushed;Patent 3/18/2017 10:49 PM        Opportunity for questions and clarification was provided.       Patient transported with:   Monitor  Registered Nurse

## 2017-03-19 NOTE — H&P
Cardiology History and Physical     Date of  Admission: 3/18/2017     Admission type: Emergency    Abby Gould is a 40 y.o. male admitted for NSTEMI (non-ST elevated myocardial infarction) ([address] Patient complains of recurrent SS CP with radiation to scapular and neck off and on for last week. It initially started when he was dumping garbage bags and now without exertion. It accompanies with SOB. Dizziness, weakness, and sweats. His BS has been uncontrolled as well. He also complains of UTI sx. His past cardiac data include CAD, s/p MIs, s/p 3 vs CABG 6 years ago at [facility] and s/p stents in 2012 in unknown vessel. He has HTN and insulin dependent DM with neuropathy. Denies any h/o CHF or ischemic CM. Patient Active Problem List    Diagnosis Date Noted    NSTEMI (non-ST elevated myocardial infarction) ([facility] [address] 03/18/2017    Acute non Q wave MI (myocardial infarction), initial episode of care [facility]) 02/23/2012    Chest pain at rest 02/22/2012    CAD (coronary artery disease) 02/22/2012    HTN (hypertension) 02/22/2012    Hyperlipidemia 02/22/2012    Unstable angina ([address] 02/22/2012      None  Past Medical History:   Diagnosis Date    CAD (coronary artery disease)     Diabetes ([facility] [address]     \"prediabetes\" Was suppos to go clinic at  [facility] [address] Endocarditis     Hypertension     MI (myocardial infarction) ([facility] [address]       Past Surgical History:   Procedure Laterality Date    CARDIAC SURG PROCEDURE UNLIST      cath with stent placement feb 2012    CARDIAC SURG PROCEDURE UNLIST      CABGx4     Family History   Problem Relation Age of Onset    Stroke Father       Prior to Admission medications    Medication Sig Start Date End Date Taking? Authorizing Provider   LORazepam (ATIVAN) 2 mg tablet Take 1 tablet by mouth every eight (8) hours as needed for Anxiety. 11/8/14  Yes Denyce Snellen, MD   cyclobenzaprine (FLEXERIL) 10 mg tablet Take 1 tablet by mouth three (3) times daily as needed for Muscle Spasm(s).  11/7/14  Yes Karrie PERSAUD ELVIA Marie   hydrocodone-acetaminophen (NORCO) 5-325 mg per tablet Take 1 tablet by mouth every four (4) hours as needed for Pain. 10/11/14  Yes Tasha Gracia MD   ibuprofen (MOTRIN) 400 mg tablet Take 1 tablet by mouth every six (6) hours as needed for Pain. 10/11/14  Yes Tasha Gracia MD   omeprazole (PRILOSEC) 20 mg capsule Take 20 mg by mouth daily. Yes Luis Alfredo Victor MD   simvastatin (ZOCOR) 40 mg tablet Take 20 mg by mouth nightly. Yes Luis Alfredo Victor MD   lisinopril (PRINIVIL, ZESTRIL) 10 mg tablet Take 10 mg by mouth daily. Drew Victor MD   clopidogrel (PLAVIX) 75 mg tablet Take 75 mg by mouth daily. Yes Luis Alfredo Victor MD   rosuvastatin (CRESTOR) 10 mg tablet Take 10 mg by mouth nightly. Yes Luis Alfredo Victor MD   hydrOXYzine (VISTARIL) 50 mg capsule Take 50 mg by mouth every six (6) hours as needed. Yes Luis Alfredo Victor MD   metoprolol (LOPRESSOR) 25 mg tablet Take 12.5 mg by mouth two (2) times a day. Yes Luis Alfredo Victor MD   Cholecalciferol, Vitamin D3, 1,000 unit Cap Take 2 Tabs by mouth two (2) times a day. Yes Luis Alfredo Victor MD   miconazole (MICOTIN) 2 % powder Apply  to affected area as needed. Yes Luis Alfredo Victor MD   ranitidine (ZANTAC) 150 mg tablet Take 150 mg by mouth nightly. Yes Luis Alfredo Victor MD   nicotinic acid (NIACIN) 500 mg tablet Take 500 mg by mouth Daily (before breakfast). Take one tab by mouth for 30 days then take 2 tabs by mouth at bedtime   Yes Luis Alfredo Victor MD   amLODIPine (NORVASC) 10 mg tablet Take 5 mg by mouth daily. Drew Victor MD   aspirin 81 mg chewable tablet Take 81 mg by mouth daily. Yes Luis Alfredo Victor MD   nitroglycerin (NITROSTAT) 0.4 mg SL tablet by SubLINGual route every five (5) minutes as needed. Yes Luis Alfredo Victor MD   calcium carbonate (TUMS) 200 mg calcium (500 mg) Chew Take 2 Tabs by mouth as needed.      Yes Historical Provider      Allergies   Allergen Reactions    Tramadol Itching        Review of Systems    Review of Systems  Except as noted in HPI, patient denies recent fever or chills, nausea, vomiting, diarrhea, hemoptysis, hematemesis, dysuria, myalgias, focal neurologic symptoms, ecchymosis, angioedema, odynophagia, dysphagia, sore throat, earache,rash, melena, hematochezia, depression, GERD, cold intolerance, petechia, bleeding gums, or significant weight loss. A comprehensive review of systems was negative except for that written in the HPI. Physical Exam    Objective:    Physical Exam:  24 hr VS reviewed, overall VSSAF  Temp (24hrs), Av.5 °F (36.9 °C), Min:98.5 °F (36.9 °C), Max:98.5 °F (36.9 °C)    Patient Vitals for the past 8 hrs:   Pulse   17 2248 94   17 2240 95   17 2230 95   17 2218 95   17 2155 92    Patient Vitals for the past 8 hrs:   Resp   17 2240 11   17 2230 10   17 2155 14    Patient Vitals for the past 8 hrs:   BP   17 2248 (!) 163/102   17 2240 (!) 163/102   17 2230 (!) 165/109   17 2218 136/90   17 2155 136/90        No intake or output data in the 24 hours ending 17 2256  General Appearance:   [x] well developed, well nourished,  [x]NAD. []     agitated   []     lethargic but arousable  []     obtunded   ENT, Palate:    [x] WNL   []     dry palate/MM     [x]anicteric     Respiratory:    [x]CTA bilateral  [] rales  [] rhonchi  [] normal resp effort   Cardiovascular:   [x] RRR   [] Irregular rate and rhythm  [] ectopy   [x] Normal S1, S2   [x]  No gallop or rub.    [] no murmur   [] murmur c/w:   [x]  no edema RLE:[]1+ [] 2+ []3+; LLE:[]1+ []2+ [] 3+   [x]  normal JVP   []     Elevated JVP    [x] carotid upstroke nl   [x] abd aorta not palpated   [x] no stigmata of peripheral emboli   GI:    [x] abd soft, non-distented,bowel sounds present, no                     organomegaly appreciated   Skin:  Neuro:      [x]  warm and dry []     cold extremities   [x]  A/O x 3,  [x]     grossly non-focal    []  lethargic but arousable  [] obtunded        Cardiographics    Telemetry: normal sinus rhythm  ECG: normal EKG, normal sinus rhythm, unchanged from previous tracings  Echocardiogram: Not done    Labs:   Recent Results (from the past 24 hour(s))   TROPONIN I    Collection Time: 03/18/17 10:00 PM   Result Value Ref Range    Troponin-I, Qt. 3.77 (H) <0.05 ng/mL   CBC WITH AUTOMATED DIFF    Collection Time: 03/18/17 10:00 PM   Result Value Ref Range    WBC 11.4 (H) 4.1 - 11.1 K/uL    RBC 4.27 4. 10 - 5.70 M/uL    HGB 13.8 12.1 - 17.0 g/dL    HCT 39.6 36.6 - 50.3 %    MCV 92.7 80.0 - 99.0 FL    MCH 32.3 26.0 - 34.0 PG    MCHC 34.8 30.0 - 36.5 g/dL    RDW 13.1 11.5 - 14.5 %    PLATELET 997 701 - 736 K/uL    NEUTROPHILS 57 32 - 75 %    LYMPHOCYTES 35 12 - 49 %    MONOCYTES 5 5 - 13 %    EOSINOPHILS 3 0 - 7 %    BASOPHILS 0 0 - 1 %    ABS. NEUTROPHILS 6.4 1.8 - 8.0 K/UL    ABS. LYMPHOCYTES 4.0 (H) 0.8 - 3.5 K/UL    ABS. MONOCYTES 0.5 0.0 - 1.0 K/UL    ABS. EOSINOPHILS 0.4 0.0 - 0.4 K/UL    ABS. BASOPHILS 0.0 0.0 - 0.1 K/UL   METABOLIC PANEL, COMPREHENSIVE    Collection Time: 03/18/17 10:00 PM   Result Value Ref Range    Sodium 139 136 - 145 mmol/L    Potassium 3.9 3.5 - 5.1 mmol/L    Chloride 103 97 - 108 mmol/L    CO2 26 21 - 32 mmol/L    Anion gap 10 5 - 15 mmol/L    Glucose 311 (H) 65 - 100 mg/dL    BUN 18 6 - 20 MG/DL    Creatinine 1.23 0.70 - 1.30 MG/DL    BUN/Creatinine ratio 15 12 - 20      GFR est AA >60 >60 ml/min/1.73m2    GFR est non-AA >60 >60 ml/min/1.73m2    Calcium 8.7 8.5 - 10.1 MG/DL    Bilirubin, total 0.2 0.2 - 1.0 MG/DL    ALT (SGPT) 33 12 - 78 U/L    AST (SGOT) 37 15 - 37 U/L    Alk.  phosphatase 105 45 - 117 U/L    Protein, total 7.3 6.4 - 8.2 g/dL    Albumin 3.3 (L) 3.5 - 5.0 g/dL    Globulin 4.0 2.0 - 4.0 g/dL    A-G Ratio 0.8 (L) 1.1 - 2.2     CK W/ CKMB & INDEX    Collection Time: 03/18/17 10:00 PM   Result Value Ref Range     (H) 39 - 308 U/L    CK - MB 14.2 (H) <3.6 NG/ML    CK-MB Index 4.1 (H) 0 - 2.5 D DIMER    Collection Time: 03/18/17 10:00 PM   Result Value Ref Range    D-dimer 0.22 0.00 - 0.65 mg/L FEU   PROTHROMBIN TIME + INR    Collection Time: 03/18/17 10:00 PM   Result Value Ref Range    INR 0.9 0.9 - 1.1      Prothrombin time 9.2 9.0 - 11.1 sec   PTT    Collection Time: 03/18/17 10:00 PM   Result Value Ref Range    aPTT 28.3 22.1 - 32.5 sec    aPTT, therapeutic range     58.0 - 77.0 SECS   EKG, 12 LEAD, INITIAL    Collection Time: 03/18/17 10:00 PM   Result Value Ref Range    Ventricular Rate 89 BPM    Atrial Rate 89 BPM    P-R Interval 160 ms    QRS Duration 82 ms    Q-T Interval 380 ms    QTC Calculation (Bezet) 462 ms    Calculated P Axis -18 degrees    Calculated R Axis 73 degrees    Calculated T Axis 170 degrees    Diagnosis       Normal sinus rhythm  Inferior infarct , age undetermined  ST & T wave abnormality, consider lateral ischemia  When compared with ECG of 08-NOV-2014 07:39,  Inferior infarct is now present  ST elevation now present in Inferior leads  T wave inversion now evident in Lateral leads          Assessment:     Assessment:      Active Problems:    NSTEMI (non-ST elevated myocardial infarction) ([facility] [address] (3/18/2017)    [place]  CAD  S/p CABG and stents in past  HTN; uncontrolled an not certain whether he is compliant  DM, insulin dependent     Plan:    To CCU  IV NTG  Lovenox  ASA  Increase BB      Signed By: Lisa Vazquez MD     March 18, 2017

## 2017-03-20 LAB
ATRIAL RATE: 85 BPM
CALCULATED P AXIS, ECG09: 67 DEGREES
CALCULATED R AXIS, ECG10: 78 DEGREES
CALCULATED T AXIS, ECG11: -174 DEGREES
DIAGNOSIS, 93000: NORMAL
P-R INTERVAL, ECG05: 154 MS
Q-T INTERVAL, ECG07: 390 MS
QRS DURATION, ECG06: 106 MS
QTC CALCULATION (BEZET), ECG08: 464 MS
VENTRICULAR RATE, ECG03: 85 BPM

## 2017-03-22 ENCOUNTER — TELEPHONE (OUTPATIENT)
Dept: CARDIAC REHAB | Age: 47
End: 2017-03-22

## 2017-03-22 NOTE — TELEPHONE ENCOUNTER
3/22/2017 Cardiac Wellness: Called Mr. Abby Gould  to discuss participation in the Cardiac Wellness Program following NSTEMI on 3/18/2017. Pt. Was transferred to the [facility]. Unable to leave a message due to # being out of service.  Carmen Carvalho RN

## 2018-02-06 ENCOUNTER — APPOINTMENT (OUTPATIENT)
Dept: GENERAL RADIOLOGY | Age: 48
DRG: 247 | End: 2018-02-06
Attending: EMERGENCY MEDICINE
Payer: OTHER GOVERNMENT

## 2018-02-06 ENCOUNTER — HOSPITAL ENCOUNTER (INPATIENT)
Age: 48
LOS: 1 days | Discharge: HOME | DRG: 247 | End: 2018-02-08
Attending: EMERGENCY MEDICINE | Admitting: INTERNAL MEDICINE
Payer: OTHER GOVERNMENT

## 2018-02-06 DIAGNOSIS — R07.9 CHEST PAIN, UNSPECIFIED TYPE: Primary | ICD-10-CM

## 2018-02-06 LAB
ALBUMIN SERPL-MCNC: 4.3 G/DL (ref 3.5–5)
ALBUMIN/GLOB SERPL: 0.9 {RATIO} (ref 1.1–2.2)
ALP SERPL-CCNC: 97 U/L (ref 45–117)
ALT SERPL-CCNC: 35 U/L (ref 12–78)
AMPHET UR QL SCN: NEGATIVE
ANION GAP SERPL CALC-SCNC: 9 MMOL/L (ref 5–15)
APPEARANCE UR: CLEAR
APTT PPP: 30.8 SEC (ref 22.1–32.5)
AST SERPL-CCNC: 24 U/L (ref 15–37)
BACTERIA URNS QL MICRO: NEGATIVE /HPF
BARBITURATES UR QL SCN: NEGATIVE
BASOPHILS # BLD: 0.1 K/UL (ref 0–0.1)
BASOPHILS NFR BLD: 1 % (ref 0–1)
BENZODIAZ UR QL: NEGATIVE
BILIRUB SERPL-MCNC: 0.7 MG/DL (ref 0.2–1)
BILIRUB UR QL: NEGATIVE
BNP SERPL-MCNC: 414 PG/ML (ref 0–125)
BUN SERPL-MCNC: 20 MG/DL (ref 6–20)
BUN/CREAT SERPL: 16 (ref 12–20)
CALCIUM SERPL-MCNC: 10 MG/DL (ref 8.5–10.1)
CANNABINOIDS UR QL SCN: NEGATIVE
CHLORIDE SERPL-SCNC: 99 MMOL/L (ref 97–108)
CK MB CFR SERPL CALC: 3.3 % (ref 0–2.5)
CK MB SERPL-MCNC: 10.8 NG/ML (ref 5–25)
CK SERPL-CCNC: 329 U/L (ref 39–308)
CO2 SERPL-SCNC: 25 MMOL/L (ref 21–32)
COCAINE UR QL SCN: POSITIVE
COLOR UR: ABNORMAL
CREAT SERPL-MCNC: 1.28 MG/DL (ref 0.7–1.3)
DIFFERENTIAL METHOD BLD: ABNORMAL
DRUG SCRN COMMENT,DRGCM: ABNORMAL
EOSINOPHIL # BLD: 0.2 K/UL (ref 0–0.4)
EOSINOPHIL NFR BLD: 3 % (ref 0–7)
EPITH CASTS URNS QL MICRO: ABNORMAL /LPF
ERYTHROCYTE [DISTWIDTH] IN BLOOD BY AUTOMATED COUNT: 13.2 % (ref 11.5–14.5)
GLOBULIN SER CALC-MCNC: 5 G/DL (ref 2–4)
GLUCOSE BLD STRIP.AUTO-MCNC: 204 MG/DL (ref 65–100)
GLUCOSE SERPL-MCNC: 135 MG/DL (ref 65–100)
GLUCOSE UR STRIP.AUTO-MCNC: NEGATIVE MG/DL
HCT VFR BLD AUTO: 48.6 % (ref 36.6–50.3)
HGB BLD-MCNC: 16.6 G/DL (ref 12.1–17)
HGB UR QL STRIP: ABNORMAL
HYALINE CASTS URNS QL MICRO: ABNORMAL /LPF (ref 0–5)
IMM GRANULOCYTES # BLD: 0 K/UL (ref 0–0.04)
IMM GRANULOCYTES NFR BLD AUTO: 1 % (ref 0–0.5)
INR PPP: 1 (ref 0.9–1.1)
KETONES UR QL STRIP.AUTO: NEGATIVE MG/DL
LEUKOCYTE ESTERASE UR QL STRIP.AUTO: ABNORMAL
LIPASE SERPL-CCNC: 155 U/L (ref 73–393)
LYMPHOCYTES # BLD: 2.3 K/UL (ref 0.8–3.5)
LYMPHOCYTES NFR BLD: 27 % (ref 12–49)
MCH RBC QN AUTO: 31.8 PG (ref 26–34)
MCHC RBC AUTO-ENTMCNC: 34.2 G/DL (ref 30–36.5)
MCV RBC AUTO: 93.1 FL (ref 80–99)
METHADONE UR QL: NEGATIVE
MONOCYTES # BLD: 0.5 K/UL (ref 0–1)
MONOCYTES NFR BLD: 6 % (ref 5–13)
NEUTS SEG # BLD: 5.2 K/UL (ref 1.8–8)
NEUTS SEG NFR BLD: 63 % (ref 32–75)
NITRITE UR QL STRIP.AUTO: NEGATIVE
NRBC # BLD: 0 K/UL (ref 0–0.01)
NRBC BLD-RTO: 0 PER 100 WBC
OPIATES UR QL: NEGATIVE
PCP UR QL: NEGATIVE
PH UR STRIP: 6 [PH] (ref 5–8)
PLATELET # BLD AUTO: 225 K/UL (ref 150–400)
PMV BLD AUTO: 9.8 FL (ref 8.9–12.9)
POTASSIUM SERPL-SCNC: 4 MMOL/L (ref 3.5–5.1)
PROT SERPL-MCNC: 9.3 G/DL (ref 6.4–8.2)
PROT UR STRIP-MCNC: 100 MG/DL
PROTHROMBIN TIME: 9.6 SEC (ref 9–11.1)
RBC # BLD AUTO: 5.22 M/UL (ref 4.1–5.7)
RBC #/AREA URNS HPF: ABNORMAL /HPF (ref 0–5)
SERVICE CMNT-IMP: ABNORMAL
SODIUM SERPL-SCNC: 133 MMOL/L (ref 136–145)
SP GR UR REFRACTOMETRY: 1.01 (ref 1–1.03)
THERAPEUTIC RANGE,PTTT: NORMAL SECS (ref 58–77)
TROPONIN I SERPL-MCNC: <0.04 NG/ML
UR CULT HOLD, URHOLD: NORMAL
UROBILINOGEN UR QL STRIP.AUTO: 0.2 EU/DL (ref 0.2–1)
WBC # BLD AUTO: 8.4 K/UL (ref 4.1–11.1)
WBC URNS QL MICRO: ABNORMAL /HPF (ref 0–4)

## 2018-02-06 PROCEDURE — 85610 PROTHROMBIN TIME: CPT | Performed by: EMERGENCY MEDICINE

## 2018-02-06 PROCEDURE — 99285 EMERGENCY DEPT VISIT HI MDM: CPT

## 2018-02-06 PROCEDURE — 74011636637 HC RX REV CODE- 636/637: Performed by: INTERNAL MEDICINE

## 2018-02-06 PROCEDURE — 80307 DRUG TEST PRSMV CHEM ANLYZR: CPT | Performed by: EMERGENCY MEDICINE

## 2018-02-06 PROCEDURE — 83690 ASSAY OF LIPASE: CPT | Performed by: EMERGENCY MEDICINE

## 2018-02-06 PROCEDURE — 99218 HC RM OBSERVATION: CPT

## 2018-02-06 PROCEDURE — 82550 ASSAY OF CK (CPK): CPT | Performed by: EMERGENCY MEDICINE

## 2018-02-06 PROCEDURE — 85730 THROMBOPLASTIN TIME PARTIAL: CPT | Performed by: EMERGENCY MEDICINE

## 2018-02-06 PROCEDURE — 82962 GLUCOSE BLOOD TEST: CPT

## 2018-02-06 PROCEDURE — 85025 COMPLETE CBC W/AUTO DIFF WBC: CPT | Performed by: EMERGENCY MEDICINE

## 2018-02-06 PROCEDURE — 74011250637 HC RX REV CODE- 250/637: Performed by: EMERGENCY MEDICINE

## 2018-02-06 PROCEDURE — 71045 X-RAY EXAM CHEST 1 VIEW: CPT

## 2018-02-06 PROCEDURE — 74011250637 HC RX REV CODE- 250/637: Performed by: INTERNAL MEDICINE

## 2018-02-06 PROCEDURE — 93005 ELECTROCARDIOGRAM TRACING: CPT

## 2018-02-06 PROCEDURE — 84484 ASSAY OF TROPONIN QUANT: CPT | Performed by: EMERGENCY MEDICINE

## 2018-02-06 PROCEDURE — 74011250636 HC RX REV CODE- 250/636: Performed by: INTERNAL MEDICINE

## 2018-02-06 PROCEDURE — 81001 URINALYSIS AUTO W/SCOPE: CPT | Performed by: EMERGENCY MEDICINE

## 2018-02-06 PROCEDURE — 36415 COLL VENOUS BLD VENIPUNCTURE: CPT | Performed by: EMERGENCY MEDICINE

## 2018-02-06 PROCEDURE — 96372 THER/PROPH/DIAG INJ SC/IM: CPT

## 2018-02-06 PROCEDURE — 83880 ASSAY OF NATRIURETIC PEPTIDE: CPT | Performed by: EMERGENCY MEDICINE

## 2018-02-06 PROCEDURE — 80053 COMPREHEN METABOLIC PANEL: CPT | Performed by: EMERGENCY MEDICINE

## 2018-02-06 RX ORDER — SODIUM CHLORIDE 9 MG/ML
1.5 INJECTION, SOLUTION INTRAVENOUS CONTINUOUS
Status: DISPENSED | OUTPATIENT
Start: 2018-02-07 | End: 2018-02-07

## 2018-02-06 RX ORDER — ENOXAPARIN SODIUM 100 MG/ML
80 INJECTION SUBCUTANEOUS EVERY 12 HOURS
Status: DISCONTINUED | OUTPATIENT
Start: 2018-02-06 | End: 2018-02-07

## 2018-02-06 RX ORDER — OMEPRAZOLE 20 MG/1
20 CAPSULE, DELAYED RELEASE ORAL DAILY
Status: DISCONTINUED | OUTPATIENT
Start: 2018-02-07 | End: 2018-02-07 | Stop reason: ALTCHOICE

## 2018-02-06 RX ORDER — RANITIDINE 150 MG/1
150 TABLET, FILM COATED ORAL
Status: DISCONTINUED | OUTPATIENT
Start: 2018-02-06 | End: 2018-02-07 | Stop reason: ALTCHOICE

## 2018-02-06 RX ORDER — ATORVASTATIN CALCIUM 40 MG/1
80 TABLET, FILM COATED ORAL DAILY
Status: DISCONTINUED | OUTPATIENT
Start: 2018-02-07 | End: 2018-02-07 | Stop reason: SDUPTHER

## 2018-02-06 RX ORDER — CLOPIDOGREL BISULFATE 75 MG/1
75 TABLET ORAL DAILY
Status: DISCONTINUED | OUTPATIENT
Start: 2018-02-07 | End: 2018-02-07 | Stop reason: SDUPTHER

## 2018-02-06 RX ORDER — INSULIN LISPRO 100 [IU]/ML
16 INJECTION, SOLUTION INTRAVENOUS; SUBCUTANEOUS
Status: DISCONTINUED | OUTPATIENT
Start: 2018-02-06 | End: 2018-02-08 | Stop reason: HOSPADM

## 2018-02-06 RX ORDER — LORAZEPAM 2 MG/1
2 TABLET ORAL
Status: DISCONTINUED | OUTPATIENT
Start: 2018-02-06 | End: 2018-02-08 | Stop reason: HOSPADM

## 2018-02-06 RX ORDER — GUAIFENESIN 100 MG/5ML
81 LIQUID (ML) ORAL DAILY
Status: DISCONTINUED | OUTPATIENT
Start: 2018-02-07 | End: 2018-02-08 | Stop reason: HOSPADM

## 2018-02-06 RX ORDER — ROSUVASTATIN CALCIUM 40 MG/1
40 TABLET, COATED ORAL DAILY
COMMUNITY

## 2018-02-06 RX ORDER — HYDROCODONE BITARTRATE AND ACETAMINOPHEN 5; 325 MG/1; MG/1
1 TABLET ORAL
Status: DISCONTINUED | OUTPATIENT
Start: 2018-02-06 | End: 2018-02-08 | Stop reason: HOSPADM

## 2018-02-06 RX ORDER — IBUPROFEN 400 MG/1
400 TABLET ORAL
Status: DISCONTINUED | OUTPATIENT
Start: 2018-02-06 | End: 2018-02-08 | Stop reason: HOSPADM

## 2018-02-06 RX ORDER — SODIUM CHLORIDE 0.9 % (FLUSH) 0.9 %
5-10 SYRINGE (ML) INJECTION EVERY 8 HOURS
Status: DISCONTINUED | OUTPATIENT
Start: 2018-02-06 | End: 2018-02-07

## 2018-02-06 RX ORDER — CALCIUM CARBONATE 200(500)MG
400 TABLET,CHEWABLE ORAL DAILY
Status: DISCONTINUED | OUTPATIENT
Start: 2018-02-07 | End: 2018-02-08 | Stop reason: HOSPADM

## 2018-02-06 RX ORDER — CYCLOBENZAPRINE HCL 10 MG
10 TABLET ORAL
Status: DISCONTINUED | OUTPATIENT
Start: 2018-02-06 | End: 2018-02-07 | Stop reason: ALTCHOICE

## 2018-02-06 RX ORDER — SODIUM CHLORIDE 0.9 % (FLUSH) 0.9 %
5-10 SYRINGE (ML) INJECTION AS NEEDED
Status: DISCONTINUED | OUTPATIENT
Start: 2018-02-06 | End: 2018-02-07

## 2018-02-06 RX ORDER — LISINOPRIL 20 MG/1
10 TABLET ORAL DAILY
COMMUNITY
End: 2018-08-09

## 2018-02-06 RX ORDER — AMLODIPINE BESYLATE 5 MG/1
5 TABLET ORAL DAILY
Status: DISCONTINUED | OUTPATIENT
Start: 2018-02-07 | End: 2018-02-07 | Stop reason: ALTCHOICE

## 2018-02-06 RX ORDER — ALBUTEROL SULFATE 90 UG/1
2 AEROSOL, METERED RESPIRATORY (INHALATION)
Status: ON HOLD | COMMUNITY
End: 2020-03-13

## 2018-02-06 RX ORDER — CARVEDILOL 12.5 MG/1
12.5 TABLET ORAL 2 TIMES DAILY WITH MEALS
Status: DISCONTINUED | OUTPATIENT
Start: 2018-02-06 | End: 2018-02-08 | Stop reason: HOSPADM

## 2018-02-06 RX ORDER — LISINOPRIL 20 MG/1
20 TABLET ORAL DAILY
Status: DISCONTINUED | OUTPATIENT
Start: 2018-02-07 | End: 2018-02-07 | Stop reason: DRUGHIGH

## 2018-02-06 RX ORDER — NITROGLYCERIN 0.4 MG/1
0.4 TABLET SUBLINGUAL AS NEEDED
Status: DISCONTINUED | OUTPATIENT
Start: 2018-02-06 | End: 2018-02-08 | Stop reason: HOSPADM

## 2018-02-06 RX ORDER — INSULIN GLARGINE 100 [IU]/ML
12 INJECTION, SOLUTION SUBCUTANEOUS DAILY
Status: DISCONTINUED | OUTPATIENT
Start: 2018-02-07 | End: 2018-02-07 | Stop reason: SDUPTHER

## 2018-02-06 RX ORDER — SODIUM CHLORIDE 9 MG/ML
3 INJECTION, SOLUTION INTRAVENOUS CONTINUOUS
Status: DISPENSED | OUTPATIENT
Start: 2018-02-07 | End: 2018-02-07

## 2018-02-06 RX ORDER — METOPROLOL TARTRATE 25 MG/1
12.5 TABLET, FILM COATED ORAL 2 TIMES DAILY
Status: DISCONTINUED | OUTPATIENT
Start: 2018-02-06 | End: 2018-02-07 | Stop reason: SDUPTHER

## 2018-02-06 RX ORDER — GABAPENTIN 600 MG/1
600 TABLET ORAL 3 TIMES DAILY
Status: DISCONTINUED | OUTPATIENT
Start: 2018-02-06 | End: 2018-02-08 | Stop reason: HOSPADM

## 2018-02-06 RX ORDER — MAG HYDROX/ALUMINUM HYD/SIMETH 200-200-20
30 SUSPENSION, ORAL (FINAL DOSE FORM) ORAL
Status: ON HOLD | COMMUNITY
End: 2020-03-13

## 2018-02-06 RX ORDER — SIMVASTATIN 20 MG/1
20 TABLET, FILM COATED ORAL
Status: DISCONTINUED | OUTPATIENT
Start: 2018-02-06 | End: 2018-02-07 | Stop reason: SDUPTHER

## 2018-02-06 RX ORDER — ROSUVASTATIN CALCIUM 40 MG/1
40 TABLET, COATED ORAL
Status: DISCONTINUED | OUTPATIENT
Start: 2018-02-06 | End: 2018-02-08 | Stop reason: HOSPADM

## 2018-02-06 RX ADMIN — Medication 10 ML: at 21:14

## 2018-02-06 RX ADMIN — IBUPROFEN 400 MG: 400 TABLET, FILM COATED ORAL at 19:48

## 2018-02-06 RX ADMIN — GABAPENTIN 600 MG: 600 TABLET, FILM COATED ORAL at 21:14

## 2018-02-06 RX ADMIN — NITROGLYCERIN 1 INCH: 20 OINTMENT TOPICAL at 23:21

## 2018-02-06 RX ADMIN — CARVEDILOL 12.5 MG: 12.5 TABLET, FILM COATED ORAL at 17:01

## 2018-02-06 RX ADMIN — NITROGLYCERIN 1 INCH: 20 OINTMENT TOPICAL at 11:41

## 2018-02-06 RX ADMIN — HYDROCODONE BITARTRATE AND ACETAMINOPHEN 1 TABLET: 5; 325 TABLET ORAL at 21:15

## 2018-02-06 RX ADMIN — HYDROCODONE BITARTRATE AND ACETAMINOPHEN 1 TABLET: 5; 325 TABLET ORAL at 17:01

## 2018-02-06 RX ADMIN — LORAZEPAM 2 MG: 2 TABLET ORAL at 17:42

## 2018-02-06 RX ADMIN — INSULIN LISPRO 16 UNITS: 100 INJECTION, SOLUTION INTRAVENOUS; SUBCUTANEOUS at 17:01

## 2018-02-06 RX ADMIN — ENOXAPARIN SODIUM 80 MG: 80 INJECTION SUBCUTANEOUS at 14:04

## 2018-02-06 RX ADMIN — NITROGLYCERIN 1 INCH: 20 OINTMENT TOPICAL at 17:01

## 2018-02-06 RX ADMIN — Medication 10 ML: at 17:02

## 2018-02-06 RX ADMIN — GABAPENTIN 600 MG: 600 TABLET, FILM COATED ORAL at 17:01

## 2018-02-06 NOTE — IP AVS SNAPSHOT
[address] [facility] Box Pending sale to [facility] 
[phone] Patient: Kimmie Christine 
MRN: [id number]:[id number] About your hospitalization You were admitted on:  February 6, 2018 You last received care in the:  [facility] 4 CV SERVICES UNIT You were discharged on:  February 8, 2018 Why you were hospitalized Your primary diagnosis was:  Not on File Your diagnoses also included:  Unstable Angina (Hcc) Follow-up Information Follow up With Details Comments Contact Info  
 call and schedule Cardiac Rehab appointment at [facility] after discharge. Call in 1 week after discharge. [facility] Cardiac Rehab contact is Randy Lawrence @ [phone] None   None (395) Patient stated that they have no PCP Discharge Orders None A check dirk indicates which time of day the medication should be taken. My Medications CHANGE how you take these medications Instructions Each Dose to Equal  
 Morning Noon Evening Bedtime * ticagrelor 90 mg tablet Commonly known as:  [employer] What changed:  Another medication with the same name was added. Make sure you understand how and when to take each. Your last dose was: Your next dose is: Take 90 mg by mouth two (2) times a day. 90 mg  
    
   
   
   
  
 * ticagrelor 90 mg tablet Commonly known as:  [employer] What changed: You were already taking a medication with the same name, and this prescription was added. Make sure you understand how and when to take each. Your last dose was: Your next dose is: Take 1 Tab by mouth every twelve (12) hours every twelve (12) hours. 90 mg * Notice: This list has 2 medication(s) that are the same as other medications prescribed for you. Read the directions carefully, and ask your doctor or other care provider to review them with you. CONTINUE taking these medications Instructions Each Dose to Equal  
 Morning Noon Evening Bedtime  
 albuterol 90 mcg/actuation inhaler Commonly known as:  PROVENTIL HFA, VENTOLIN HFA, PROAIR HFA Your last dose was: Your next dose is: Take 2 Puffs by inhalation every four (4) hours as needed for Wheezing. 2 Puff  
    
   
   
   
  
 alum-mag hydroxide-simeth 200-200-20 mg/5 mL Susp Commonly known as:  MYLANTA Your last dose was: Your next dose is: Take 30 mL by mouth three (3) times daily as needed. 30 mL  
    
   
   
   
  
 aspirin 81 mg chewable tablet Your last dose was: Your next dose is: Take 81 mg by mouth daily. 81 mg  
    
   
   
   
  
 calcium carbonate 200 mg calcium (500 mg) Chew Commonly known as:  TUMS Your last dose was: Your next dose is: Take 2 Tabs by mouth as needed. 2 Tab  
    
   
   
   
  
 carvedilol 12.5 mg tablet Commonly known as:  Lavpiero Thomass Your last dose was: Your next dose is: Take 12.5 mg by mouth two (2) times daily (with meals). 12.5 mg  
    
   
   
   
  
 FISH -160-1,000 mg Cap Generic drug:  omega 3-dha-epa-fish oil Your last dose was: Your next dose is: Take 1,000 mg by mouth daily. 1000 mg  
    
   
   
   
  
 gabapentin 600 mg tablet Commonly known as:  NEURONTIN Your last dose was: Your next dose is: Take 600 mg by mouth three (3) times daily. Indications: NEUROPATHIC PAIN  
 600 mg  
    
   
   
   
  
 ibuprofen 400 mg tablet Commonly known as:  MOTRIN Your last dose was: Your next dose is: Take 1 tablet by mouth every six (6) hours as needed for Pain. 400 mg  
    
   
   
   
  
 LANTUS 100 unit/mL injection Generic drug:  insulin glargine Your last dose was: Your next dose is: 22 Units by SubCUTAneous route daily. Indications: takes in the am  
 22 Units  
    
   
   
   
  
 lisinopril 20 mg tablet Commonly known as:  Deljims Rumble Your last dose was: Your next dose is: Take 10 mg by mouth daily. 10 mg  
    
   
   
   
  
 nitroglycerin 0.4 mg SL tablet Commonly known as:  NITROSTAT Your last dose was: Your next dose is:    
   
   
 by SubLINGual route every five (5) minutes as needed. NovoLOG Flexpen 100 unit/mL Inpn Generic drug:  insulin aspart Your last dose was: Your next dose is:    
   
   
 14 Units by SubCUTAneous route Before breakfast, lunch, and dinner. Indications: Gestational Diabetes Mellitus 14 Units  
    
   
   
   
  
 rosuvastatin 40 mg tablet Commonly known as:  CRESTOR Your last dose was: Your next dose is: Take 40 mg by mouth daily. 40 mg Where to Get Your Medications Information on where to get these meds will be given to you by the nurse or doctor. ! Ask your nurse or doctor about these medications  
  ticagrelor 90 mg tablet Discharge Instructions [email or web address] Patient Discharge Instructions Adán Susi / [id number] : 1970 Admitted 2018 Discharged: 2018 · It is important that you take the medication exactly as they are prescribed. · Keep your medication in the bottles provided by the pharmacist and keep a list of the medication names, dosages, and times to be taken in your wallet. · Do not take other medications without consulting your doctor. BRING ALL OF YOUR MEDICINES TO YOUR OFFICE VISIT with Cardiologist at [facility] Follow-up with Eh Puente MD in 2 week Cardiac Catheterization  Discharge Instructions ?  Do not drive, operate any machinery, or sign any legal documents for  hours after your procedure. You must have someone to drive you home. ? You may take a shower 24 hours after your cardiac catheterization. Be sure to get the dressing wet and then remove it; gently wash the area with warm soapy water. Pat dry and leave open to air. To help prevent infections, be sure to keep the cath site clean and dry. No lotions, creams, powders, ointments, etc. in the cath site for approximately 1 week. ? Do not take a tub bath, get in a hot tub or swimming pool for approximately 5 days or until the cath site is completely healed. ? No strenuous activity or heavy lifting over 10 lbs. for 7 days. ? Drink plenty of fluids for 24-48 hours after your cath to flush the contrast dye from your kidneys. No alcoholic beverages for 24 hours. You may resume your previous diet (low fat, low cholesterol) after your cath. ? After your cath, some bruising or discomfort is common during the healing process. Tylenol, 1-2 tablets every 6 hours as needed, is recommended if you experience any discomfort. If you experience any signs or symptoms of infection such as fever, chills, or poorly healing incision, persistent tenderness or swelling in the groin, redness and/or warmth to the touch, numbness, significant tingling or pain at the groin site or affected extremity, rash, drainage from the cath site, or if the leg feels tight or swollen, call your physician right away. ? If bleeding at the cath site occurs, take a clean gauze pad and apply direct pressure to the groin just above the puncture site. Call 911 immediately, and continue to apply direct pressure until an ambulance gets to your location. ? You may return to work  2  days after your cardiac cath if no groin bleeding. Information obtained by : 
I understand that if any problems occur once I am at home I am to contact my physician. I understand and acknowledge receipt of the instructions indicated above. R.N.'s Signature                                                                  Date/Time Patient or Representative Signature                                                          Date/Time Bridger Colon MD 
 
 
  
  
  
EverConnect Announcement We are excited to announce that we are making your provider's discharge notes available to you in EverConnect. You will see these notes when they are completed and signed by the physician that discharged you from your recent hospital stay. If you have any questions or concerns about any information you see in XGrapht, please call the Health Information Department where you were seen or reach out to your Primary Care Provider for more information about your plan of care. Introducing [facility] [place] [facility]! [facility] introduces EverConnect patient portal. Now you can access parts of your medical record, email your doctor's office, and request medication refills online. 1. In your internet browser, go to [email or web address] 2. Click on the First Time User? Click Here link in the Sign In box. You will see the New Member Sign Up page. 3. Enter your EverConnect Access Code exactly as it appears below. You will not need to use this code after youve completed the sign-up process. If you do not sign up before the expiration date, you must request a new code. · EverConnect Access Code: [email or web address] Expires: 5/9/2018  8:07 AM 
 
4. Enter the last four digits of your Social Security Number (xxxx) and Date of Birth (mm/dd/yyyy) as indicated and click Submit. You will be taken to the next sign-up page. 5. Create a XGrapht ID.  This will be your XGrapht login ID and cannot be changed, so think of one that is secure and easy to remember. 6. Create a [employer] password. You can change your password at any time. 7. Enter your Password Reset Question and Answer. This can be used at a later time if you forget your password. 8. Enter your e-mail address. You will receive e-mail notification when new information is available in [address] 9. Click Sign Up. You can now view and download portions of your medical record. 10. Click the Download Summary menu link to download a portable copy of your medical information. If you have questions, please visit the Frequently Asked Questions section of the [employer] website. Remember, [employer]see is NOT to be used for urgent needs. For medical emergencies, dial 911. Now available from your iPhone and Android! Providers Seen During Your Hospitalization Provider Specialty Primary office phone Gloria Melchor MD Emergency Medicine [phone] Yonny Hill MD Cardiology [phone] Your Primary Care Physician (PCP) Primary Care Physician Office Phone Office Fax NONE ** None ** ** None ** You are allergic to the following Allergen Reactions Tramadol Itching Recent Documentation Height Weight BMI Smoking Status 1.727 m 84.2 kg 28.22 kg/m2 Former Smoker Emergency Contacts Name Discharge Info Relation Home Work Mobile Diann Blue CAREGIVER [3] Friend [5]   [phone] Patient Belongings The following personal items are in your possession at time of discharge: 
  Dental Appliances: None  Visual Aid: None      Home Medications: None   Jewelry: None  Clothing: At bedside    Other Valuables: None Please provide this summary of care documentation to your next provider. Signatures-by signing, you are acknowledging that this After Visit Summary has been reviewed with you and you have received a copy.   
  
 
  
    
    
 Patient Signature: ____________________________________________________________ Date:  ____________________________________________________________  
  
Phyliss Ghee Provider Signature:  ____________________________________________________________ Date:  ____________________________________________________________

## 2018-02-06 NOTE — IP AVS SNAPSHOT
Summary of Care Report The Summary of Care report has been created to help improve care coordination. Users with access to [employer] or [address] (Web-based application) may access additional patient information including the Discharge Summary. If you are not currently a [address] user and need more information, please call the number listed below in the [place] [address] section and ask to be connected with Medical Records. Facility Information Name Address Phone [address] [id number] [phone] Patient Information Patient Name Sex DORY Curry ([id number]) Male 1970 Discharge Information Admitting Provider Service Area Unit Afua Felix MD /  [facility] ([facility]3] [address] [facility] Unit / [phone] Discharge Provider Discharge Date/Time Discharge Disposition Destination (none) 2018 Midday (Pending) AHR (none) Patient Language Language ENGLISH [13] Hospital Problems as of 2018  Reviewed: 2012  8:25 AM by Mona Magallanes MD  
  
  
  
 Class Noted - Resolved Last Modified POA Active Problems Unstable angina ([address]  2012 - Present 2018 by Afua Felix MD Unknown Entered by Mona Magallanes MD  
  
Non-Hospital Problems as of 2018  Reviewed: 2012  8:25 AM by Mona Magallnaes MD  
  
  
  
 Class Noted - Resolved Last Modified Active Problems Chest pain at rest  2012 - Present 2012 Entered by Mona Magallanes MD  
  CAD (coronary artery disease)  2012 - Present 2012 Entered by Mona Magallanes MD  
  Overview Signed 2012  9:09 AM by Mona Magallanes MD  
    [facility] showed 3v cad and had bare metal stent to distal rca. Normal lvef. HTN (hypertension)  2012 - Present 2012 Entered by Dania Rodriguez MD  
  Hyperlipidemia  2/22/2012 - Present 2/24/2012 Entered by Dania Rodriguez MD  
  Acute non Q wave MI (myocardial infarction), initial episode of care [facility])  2/23/2012 - Present 2/24/2012 Entered by Dania Rodriguez MD  
  NSTEMI (non-ST elevated myocardial infarction) ([facility]  3/18/2017 - Present 3/18/2017 by Ann-Marie Hester MD  
  Entered by Ann-Marie Hester MD  
  
You are allergic to the following Allergen Reactions Tramadol Itching Current Discharge Medication List  
  
CONTINUE these medications which have CHANGED Dose & Instructions Dispensing Information Comments * ticagrelor 90 mg tablet Commonly known as:  [employer] What changed:  Another medication with the same name was added. Make sure you understand how and when to take each. Dose:  90 mg Take 90 mg by mouth two (2) times a day. Refills:  0  
   
 * ticagrelor 90 mg tablet Commonly known as:  [employer] What changed: You were already taking a medication with the same name, and this prescription was added. Make sure you understand how and when to take each. Dose:  90 mg Take 1 Tab by mouth every twelve (12) hours every twelve (12) hours. Quantity:  60 Tab Refills:  6  
   
 * Notice: This list has 2 medication(s) that are the same as other medications prescribed for you. Read the directions carefully, and ask your doctor or other care provider to review them with you. CONTINUE these medications which have NOT CHANGED Dose & Instructions Dispensing Information Comments  
 albuterol 90 mcg/actuation inhaler Commonly known as:  PROVENTIL HFA, VENTOLIN HFA, PROAIR HFA Dose:  2 Puff Take 2 Puffs by inhalation every four (4) hours as needed for Wheezing. Refills:  0  
   
 alum-mag hydroxide-simeth 200-200-20 mg/5 mL Susp Commonly known as:  MYLANTA Dose:  30 mL Take 30 mL by mouth three (3) times daily as needed. Refills:  0 aspirin 81 mg chewable tablet Dose:  81 mg Take 81 mg by mouth daily. Refills:  0  
   
 calcium carbonate 200 mg calcium (500 mg) Chew Commonly known as:  TUMS Dose:  2 Tab Take 2 Tabs by mouth as needed. Refills:  0  
   
 carvedilol 12.5 mg tablet Commonly known as:  Midge Chihuahua Dose:  12.5 mg Take 12.5 mg by mouth two (2) times daily (with meals). Refills:  0  
   
 FISH -160-1,000 mg Cap Generic drug:  omega 3-dha-epa-fish oil Dose:  1000 mg Take 1,000 mg by mouth daily. Refills:  0  
   
 gabapentin 600 mg tablet Commonly known as:  NEURONTIN Dose:  600 mg Take 600 mg by mouth three (3) times daily. Indications: NEUROPATHIC PAIN Refills:  0  
   
 ibuprofen 400 mg tablet Commonly known as:  MOTRIN Dose:  400 mg Take 1 tablet by mouth every six (6) hours as needed for Pain. Quantity:  20 tablet Refills:  0  
   
 LANTUS 100 unit/mL injection Generic drug:  insulin glargine Dose:  22 Units 22 Units by SubCUTAneous route daily. Indications: takes in the am  
 Refills:  0  
   
 lisinopril 20 mg tablet Commonly known as:  Porter [place]onnet Dose:  10 mg Take 10 mg by mouth daily. Refills:  0  
   
 nitroglycerin 0.4 mg SL tablet Commonly known as:  NITROSTAT  
 by SubLINGual route every five (5) minutes as needed. Refills:  0 NovoLOG Flexpen 100 unit/mL Inpn Generic drug:  insulin aspart Dose:  14 Units 14 Units by SubCUTAneous route Before breakfast, lunch, and dinner. Indications: Gestational Diabetes Mellitus Refills:  0  
   
 rosuvastatin 40 mg tablet Commonly known as:  CRESTOR Dose:  40 mg Take 40 mg by mouth daily. Refills:  0 Current Immunizations Name Date Influenza Vaccine Whole 12/22/2011 TD Vaccine 2/22/2000 Tdap 10/9/2014 ZZZ-RETIRED (DO NOT USE) Pneumococcal Vaccine (Unspecified Type) 2/22/2012 Follow-up Information Follow up With Details Comments Contact Info  
 call and schedule Cardiac Rehab appointment at [facility] hospital after discharge. Call in 1 week after discharge. [facility] Cardiac Rehab contact is Hamida Osorio @ [phone] None   None (395) Patient stated that they have no PCP Discharge Instructions [email or web address] Patient Discharge Instructions Kimberly Garcia / [id number] : 1970 Admitted 2018 Discharged: 2018 · It is important that you take the medication exactly as they are prescribed. · Keep your medication in the bottles provided by the pharmacist and keep a list of the medication names, dosages, and times to be taken in your wallet. · Do not take other medications without consulting your doctor. BRING ALL OF YOUR MEDICINES TO YOUR OFFICE VISIT with Cardiologist at [facility] Follow-up with Ivy Hamilton MD in 2 week Cardiac Catheterization  Discharge Instructions ? Do not drive, operate any machinery, or sign any legal documents for 24 hours after your procedure. You must have someone to drive you home. ? You may take a shower 24 hours after your cardiac catheterization. Be sure to get the dressing wet and then remove it; gently wash the area with warm soapy water. Pat dry and leave open to air. To help prevent infections, be sure to keep the cath site clean and dry. No lotions, creams, powders, ointments, etc. in the cath site for approximately 1 week. ? Do not take a tub bath, get in a hot tub or swimming pool for approximately 5 days or until the cath site is completely healed. ? No strenuous activity or heavy lifting over 10 lbs. for 7 days. ? Drink plenty of fluids for 24-48 hours after your cath to flush the contrast dye from your kidneys. No alcoholic beverages for 24 hours. You may resume your previous diet (low fat, low cholesterol) after your cath. ? After your cath, some bruising or discomfort is common during the healing process. Tylenol, 1-2 tablets every 6 hours as needed, is recommended if you experience any discomfort. If you experience any signs or symptoms of infection such as fever, chills, or poorly healing incision, persistent tenderness or swelling in the groin, redness and/or warmth to the touch, numbness, significant tingling or pain at the groin site or affected extremity, rash, drainage from the cath site, or if the leg feels tight or swollen, call your physician right away. ? If bleeding at the cath site occurs, take a clean gauze pad and apply direct pressure to the groin just above the puncture site. Call 911 immediately, and continue to apply direct pressure until an ambulance gets to your location. ? You may return to work  2  days after your cardiac cath if no groin bleeding. Information obtained by : 
I understand that if any problems occur once I am at home I am to contact my physician. I understand and acknowledge receipt of the instructions indicated above. R.N.'s Signature                                                                  Date/Time Patient or Representative Signature                                                          Date/Time Fatuma Aguirre MD 
 
 
Chart Review Routing History No Routing History on File

## 2018-02-06 NOTE — PROGRESS NOTES
1601: TRANSFER - IN REPORT:    Verbal report received from [facility] (name) on Foley Crumble  being received from ED (unit) for routine progression of care      Report consisted of patients Situation, Background, Assessment and   Recommendations(SBAR). Information from the following report(s) SBAR, Kardex, Intake/Output, MAR, Recent Results, Med Rec Status and Cardiac Rhythm NSR was reviewed with the receiving nurse. Opportunity for questions and clarification was provided. Assessment completed upon patients arrival to unit and care assumed. 1930: Bedside and Verbal shift change report given to Devon Abbott RN (oncoming nurse) by CAROL RN (offgoing nurse). Report included the following information SBAR, Kardex, Intake/Output, MAR, Recent Results, Med Rec Status and Cardiac Rhythm NSR.

## 2018-02-06 NOTE — PROGRESS NOTES
Admission Medication Reconciliation:    Information obtained from: patient's paperwork faxed from [address] [place] [address]    Significant PMH/Disease States:   Past Medical History:   Diagnosis Date    CAD (coronary artery disease)     Diabetes ([address]     \"prediabetes\" Was suppos to go clinic at  [facility] Endocarditis     Hypertension     MI (myocardial infarction)        Chief Complaint for this Admission:    Chief Complaint   Patient presents with    Chest Pain         Allergies:  Tramadol    Prior to Admission Medications:   Prior to Admission Medications   Prescriptions Last Dose Informant Patient Reported? Taking? albuterol (PROVENTIL HFA, VENTOLIN HFA, PROAIR HFA) 90 mcg/actuation inhaler  Transfer Papers Yes Yes   Sig: Take 2 Puffs by inhalation every four (4) hours as needed for Wheezing. alum-mag hydroxide-simeth (MYLANTA) 200-200-20 mg/5 mL susp   Yes Yes   Sig: Take 30 mL by mouth three (3) times daily as needed. aspirin 81 mg chewable tablet 2018  Yes Yes   Sig: Take 81 mg by mouth daily. calcium carbonate (TUMS) 200 mg calcium (500 mg) Chew 2018  Yes Yes   Sig: Take 2 Tabs by mouth as needed. carvedilol (COREG) 12.5 mg tablet 2018  Yes Yes   Sig: Take 12.5 mg by mouth two (2) times daily (with meals). gabapentin (NEURONTIN) 600 mg tablet 2018  Yes Yes   Sig: Take 600 mg by mouth three (3) times daily. Indications: NEUROPATHIC PAIN   ibuprofen (MOTRIN) 400 mg tablet 2018  No Yes   Sig: Take 1 tablet by mouth every six (6) hours as needed for Pain. insulin aspart (NOVOLOG FLEXPEN) 100 unit/mL inpn 2018  Yes Yes   Si Units by SubCUTAneous route Before breakfast, lunch, and dinner. Indications: Gestational Diabetes Mellitus   insulin glargine (LANTUS) 100 unit/mL injection 2018  Yes Yes   Si Units by SubCUTAneous route daily. Indications: takes in the am   lisinopril (PRINIVIL, ZESTRIL) 20 mg tablet   Yes Yes   Sig: Take 10 mg by mouth daily. nitroglycerin (NITROSTAT) 0.4 mg SL tablet 2/6/2018  Yes Yes   Sig: by SubLINGual route every five (5) minutes as needed. omega 3-dha-epa-fish oil (FISH OIL) 100-160-1,000 mg cap   Yes Yes   Sig: Take 1,000 mg by mouth daily. rosuvastatin (CRESTOR) 40 mg tablet  Transfer Papers Yes Yes   Sig: Take 40 mg by mouth daily. ticagrelor (BRILINTA) 90 mg tablet  Transfer Papers Yes Yes   Sig: Take 90 mg by mouth two (2) times a day. Facility-Administered Medications: None         Comments/Recommendations: Medication reconciliation interview completed with the patient. Additionally, contacted the [address] [place] [address] to obtain the current list of medications as the patient was unclear and said he fills all prescriptions through the [address] [place] [address]. The following changes were made:      Removed:  Amlodipine                       Atorvastatin                       Cholecalciferol                        Clopidogrel                       Cyclobenzaprine                       Hydrocodone / Acetaminophen                       Hydroxyzine                       Lorazepam                       Metoprolol                        Miconazole powder                       Niacin                       Simvastatin                       Omeprazole                        Ranitidine      Added:  Brilinta (ticagrelor) 90mg po twice daily                  Albuterol HFA inhaler                  Fish Oil                  Mylanta      Updated dosing:   Rosuvastatin is 40mg po daily. Current Insulin doses are as follows:   16 units three times daily with meals of Insulin Aspart and 22 units of Lantus daily. The admitting cardiologist will be notified of the updated list as the original un-reviewed PTA list was all that was available on first presentation. Appropriate changes will be made by the care team.      Thank you for allowing me to participate in the care of this patient.  If there are any further questions, please contact the pharmacy at  or the medication reconciliation pharmacist at . [facility]alpando[facility]

## 2018-02-06 NOTE — IP AVS SNAPSHOT
[address] 
[phone] Patient: Carlos Denney 
MRN: [id number] A check dirk indicates which time of day the medication should be taken. My Medications CHANGE how you take these medications Instructions Each Dose to Equal  
 Morning Noon Evening Bedtime * ticagrelor 90 mg tablet Commonly known as:  [employer] What changed:  Another medication with the same name was added. Make sure you understand how and when to take each. Your last dose was: Your next dose is: Take 90 mg by mouth two (2) times a day. 90 mg  
    
   
   
   
  
 * ticagrelor 90 mg tablet Commonly known as:  [employer] What changed: You were already taking a medication with the same name, and this prescription was added. Make sure you understand how and when to take each. Your last dose was: Your next dose is: Take 1 Tab by mouth every twelve (12) hours every twelve (12) hours. 90 mg * Notice: This list has 2 medication(s) that are the same as other medications prescribed for you. Read the directions carefully, and ask your doctor or other care provider to review them with you. CONTINUE taking these medications Instructions Each Dose to Equal  
 Morning Noon Evening Bedtime  
 albuterol 90 mcg/actuation inhaler Commonly known as:  PROVENTIL HFA, VENTOLIN HFA, PROAIR HFA Your last dose was: Your next dose is: Take 2 Puffs by inhalation every four (4) hours as needed for Wheezing. 2 Puff  
    
   
   
   
  
 alum-mag hydroxide-simeth 200-200-20 mg/5 mL Susp Commonly known as:  MYLANTA Your last dose was: Your next dose is: Take 30 mL by mouth three (3) times daily as needed. 30 mL  
    
   
   
   
  
 aspirin 81 mg chewable tablet Your last dose was: Your next dose is: Take 81 mg by mouth daily. 81 mg  
    
   
   
   
  
 calcium carbonate 200 mg calcium (500 mg) Chew Commonly known as:  TUMS Your last dose was: Your next dose is: Take 2 Tabs by mouth as needed. 2 Tab  
    
   
   
   
  
 carvedilol 12.5 mg tablet Commonly known as:  Megan Banerjee Your last dose was: Your next dose is: Take 12.5 mg by mouth two (2) times daily (with meals). 12.5 mg  
    
   
   
   
  
 FISH -160-1,000 mg Cap Generic drug:  omega 3-dha-epa-fish oil Your last dose was: Your next dose is: Take 1,000 mg by mouth daily. 1000 mg  
    
   
   
   
  
 gabapentin 600 mg tablet Commonly known as:  NEURONTIN Your last dose was: Your next dose is: Take 600 mg by mouth three (3) times daily. Indications: NEUROPATHIC PAIN  
 600 mg  
    
   
   
   
  
 ibuprofen 400 mg tablet Commonly known as:  MOTRIN Your last dose was: Your next dose is: Take 1 tablet by mouth every six (6) hours as needed for Pain. 400 mg  
    
   
   
   
  
 LANTUS 100 unit/mL injection Generic drug:  insulin glargine Your last dose was: Your next dose is:    
   
   
 22 Units by SubCUTAneous route daily. Indications: takes in the am  
 22 Units  
    
   
   
   
  
 lisinopril 20 mg tablet Commonly known as:  Mary Ellen Stratton Your last dose was: Your next dose is: Take 10 mg by mouth daily. 10 mg  
    
   
   
   
  
 nitroglycerin 0.4 mg SL tablet Commonly known as:  NITROSTAT Your last dose was: Your next dose is:    
   
   
 by SubLINGual route every five (5) minutes as needed. NovoLOG Flexpen 100 unit/mL Inpn Generic drug:  insulin aspart Your last dose was: Your next dose is: 14 Units by SubCUTAneous route Before breakfast, lunch, and dinner. Indications: Gestational Diabetes Mellitus 14 Units  
    
   
   
   
  
 rosuvastatin 40 mg tablet Commonly known as:  CRESTOR Your last dose was: Your next dose is: Take 40 mg by mouth daily. 40 mg Where to Get Your Medications Information on where to get these meds will be given to you by the nurse or doctor. ! Ask your nurse or doctor about these medications  
  ticagrelor 90 mg tablet

## 2018-02-06 NOTE — H&P
Cardiology History and Physical     Date of  Admission: 2/6/2018     Admission type: Emergency    Magnus Hernandez is a 56 y.o. male admitted for Unstable angina ([facility] Patient complains of SS chest pain radiating to his neck and Lt shoulder with SOB/weakness. It is same angina pain that he has had. It came on two weeks ago on exertion and now occurring at rest. He was seen by me last year in 3/17 when he presented to us with NSTEMI and I transferred to [facility] for further care but he was somehow lost in the process and then in 6/17 he had stent inLCX graft insertion to OM1 by Dr. Carolin Awad in 6/17. EF was preserved at 50% with inferior hypokinesis and patent LIMA-LAD but 3 vs occlusions. His other significant past medical history is same as my note on admission in 3/17. Patient Active Problem List    Diagnosis Date Noted    NSTEMI (non-ST elevated myocardial infarction) ([facility] 03/18/2017    Acute non Q wave MI (myocardial infarction), initial episode of care [facility]) 02/23/2012    Chest pain at rest 02/22/2012    CAD (coronary artery disease) 02/22/2012    HTN (hypertension) 02/22/2012    Hyperlipidemia 02/22/2012    Unstable angina ([facility] [address] 02/22/2012      None  Past Medical History:   Diagnosis Date    CAD (coronary artery disease)     Diabetes ([facility] [address]     \"prediabetes\" Was suppos to go clinic at  [facility] [address] Endocarditis     Hypertension     MI (myocardial infarction)       Past Surgical History:   Procedure Laterality Date    CARDIAC SURG PROCEDURE UNLIST      cath with stent placement feb 2012    CARDIAC SURG PROCEDURE UNLIST      CABGx4     Family History   Problem Relation Age of Onset    Stroke Father       Prior to Admission medications    Medication Sig Start Date End Date Taking? Authorizing Provider   carvedilol (COREG) 12.5 mg tablet Take 12.5 mg by mouth two (2) times daily (with meals). Yes Historical Provider   gabapentin (NEURONTIN) 600 mg tablet Take 600 mg by mouth three (3) times daily. Indications: NEUROPATHIC PAIN   Yes Historical Provider   atorvastatin (LIPITOR) 80 mg tablet Take 80 mg by mouth daily. Yes Historical Provider   insulin glargine (LANTUS) 100 unit/mL injection 12 Units by SubCUTAneous route daily. Indications: takes in the am   Yes Historical Provider   insulin aspart (NOVOLOG FLEXPEN) 100 unit/mL inpn 12 Units by SubCUTAneous route Before breakfast, lunch, and dinner. Indications: Gestational Diabetes Mellitus   Yes Historical Provider   LORazepam (ATIVAN) 2 mg tablet Take 1 tablet by mouth every eight (8) hours as needed for Anxiety. 11/8/14  Yes Aurora Pimentel MD   cyclobenzaprine (FLEXERIL) 10 mg tablet Take 1 tablet by mouth three (3) times daily as needed for Muscle Spasm(s). 11/7/14  Yes Karrie DiazChandler, [place]   hydrocodone-acetaminophen [facility]) 5-325 mg per tablet Take 1 tablet by mouth every four (4) hours as needed for Pain. 10/11/14  Yes Jazlyn Granados MD   ibuprofen (MOTRIN) 400 mg tablet Take 1 tablet by mouth every six (6) hours as needed for Pain. 10/11/14  Yes Jazlyn Granados MD   omeprazole (PRILOSEC) 20 mg capsule Take 20 mg by mouth daily. Yes Luis Alfredo Victor MD   simvastatin (ZOCOR) 40 mg tablet Take 20 mg by mouth nightly. Yes Luis Alfredo Victor MD   lisinopril (PRINIVIL, ZESTRIL) 10 mg tablet Take 20 mg by mouth daily. Yes Luis Alfredo Victor MD   clopidogrel (PLAVIX) 75 mg tablet Take 75 mg by mouth daily. Yes Luis Alfredo Victor MD   rosuvastatin (CRESTOR) 10 mg tablet Take 10 mg by mouth nightly. Yes Luis Alfredo Victor MD   hydrOXYzine (VISTARIL) 50 mg capsule Take 50 mg by mouth every six (6) hours as needed. Yes Luis Alfredo Victor MD   metoprolol (LOPRESSOR) 25 mg tablet Take 12.5 mg by mouth two (2) times a day. Yes Luis Alfredo Victor MD   Cholecalciferol, Vitamin D3, 1,000 unit Cap Take 2 Tabs by mouth two (2) times a day. Yes Luis Alfredo Victor MD   miconazole (MICOTIN) 2 % powder Apply  to affected area as needed.      Yes Luis Alfredo Victor MD   ranitidine (ZANTAC) 150 mg tablet Take 150 mg by mouth nightly. Yes Luis Alfredo Victor MD   nicotinic acid (NIACIN) 500 mg tablet Take 500 mg by mouth Daily (before breakfast). Take one tab by mouth for 30 days then take 2 tabs by mouth at bedtime   Yes Luis Alfredo Victor MD   amLODIPine (NORVASC) 10 mg tablet Take 5 mg by mouth daily. Yes Luis Alfredo Victor MD   aspirin 81 mg chewable tablet Take 81 mg by mouth daily. Yes Luis Alfredo Victor MD   nitroglycerin (NITROSTAT) 0.4 mg SL tablet by SubLINGual route every five (5) minutes as needed. Yes Luis Alfredo Victor MD   calcium carbonate (TUMS) 200 mg calcium (500 mg) Chew Take 2 Tabs by mouth as needed. Yes Historical Provider      Allergies   Allergen Reactions    Tramadol Itching        Review of Systems    Review of Systems  Except as noted in HPI, patient denies recent fever or chills, nausea, vomiting, diarrhea, hemoptysis, hematemesis, dysuria, myalgias, focal neurologic symptoms, ecchymosis, angioedema, odynophagia, dysphagia, sore throat, earache,rash, melena, hematochezia, depression, GERD, cold intolerance, petechia, bleeding gums, or significant weight loss. A comprehensive review of systems was negative except for that written in the HPI. Physical Exam    Objective:    Physical Exam:  24 hr VS reviewed, overall VSSAF  Temp (24hrs), Av °F (36.7 °C), Min:98 °F (36.7 °C), Max:98 °F (36.7 °C)    Patient Vitals for the past 8 hrs:   Pulse   18 1230 78   18 1200 75   18 1126 83   18 1112 87    Patient Vitals for the past 8 hrs:   Resp   18 1230 9   18 1200 8   18 1126 15   18 1112 18    Patient Vitals for the past 8 hrs:   BP   18 1230 (!) 140/91   18 1200 (!) 142/91   18 1126 126/83   18 1112 (!) 120/91        No intake or output data in the 24 hours ending 18 1307  General Appearance:   [x] well developed, well nourished,  [x]NAD.       []     agitated   []     lethargic but arousable  []     obtunded   ENT, Palate:    [x] WNL   []     dry palate/MM     [x]anicteric     Respiratory:    [x]CTA bilateral  [] rales  [] rhonchi  [] normal resp effort   Cardiovascular:   [x] RRR   [] Irregular rate and rhythm  [] ectopy   [x] Normal S1, S2   [x]  s4  gallop noted   [] no murmur   [] murmur c/w:   [x]  no edema RLE:[]1+ [] 2+ []3+; LLE:[]1+ []2+ [] 3+   [x]  normal JVP   []     Elevated JVP    [x] carotid upstroke nl   [x] abd aorta not palpated   [x] no stigmata of peripheral emboli   GI:    [x] abd soft, non-distented,bowel sounds present, no                     organomegaly appreciated   Skin:  Neuro:      [x]  warm and dry []     cold extremities   [x]  A/O x 3,  [x]     grossly non-focal    []  lethargic but arousable  [] obtunded        Cardiographics    Telemetry: normal sinus rhythm  ECG: sinus with old IWMI and NS ST&TWA  Echocardiogram: Not done    Labs:   Recent Results (from the past 24 hour(s))   EKG, 12 LEAD, INITIAL    Collection Time: 02/06/18 11:17 AM   Result Value Ref Range    Ventricular Rate 81 BPM    Atrial Rate 81 BPM    P-R Interval 166 ms    QRS Duration 104 ms    Q-T Interval 370 ms    QTC Calculation (Bezet) 429 ms    Calculated P Axis 58 degrees    Calculated R Axis 68 degrees    Calculated T Axis 148 degrees    Diagnosis       Normal sinus rhythm  Inferior infarct (cited on or before 18-MAR-2017)  T wave abnormality, consider lateral ischemia  When compared with ECG of 19-MAR-2017 08:08,  Non-specific change in ST segment in Anterior leads  T wave inversion more evident in Anterior leads     CBC WITH AUTOMATED DIFF    Collection Time: 02/06/18 11:25 AM   Result Value Ref Range    WBC 8.4 4.1 - 11.1 K/uL    RBC 5.22 4.10 - 5.70 M/uL    HGB 16.6 12.1 - 17.0 g/dL    HCT 48.6 36.6 - 50.3 %    MCV 93.1 80.0 - 99.0 FL    MCH 31.8 26.0 - 34.0 PG    MCHC 34.2 30.0 - 36.5 g/dL    RDW 13.2 11.5 - 14.5 %    PLATELET 019 087 - 028 K/uL    MPV 9.8 8.9 - 12.9 FL    NRBC 0.0 0  WBC    ABSOLUTE NRBC 0.00 0.00 - 0.01 K/uL    NEUTROPHILS 63 32 - 75 %    LYMPHOCYTES 27 12 - 49 %    MONOCYTES 6 5 - 13 %    EOSINOPHILS 3 0 - 7 %    BASOPHILS 1 0 - 1 %    IMMATURE GRANULOCYTES 1 (H) 0.0 - 0.5 %    ABS. NEUTROPHILS 5.2 1.8 - 8.0 K/UL    ABS. LYMPHOCYTES 2.3 0.8 - 3.5 K/UL    ABS. MONOCYTES 0.5 0.0 - 1.0 K/UL    ABS. EOSINOPHILS 0.2 0.0 - 0.4 K/UL    ABS. BASOPHILS 0.1 0.0 - 0.1 K/UL    ABS. IMM. GRANS. 0.0 0.00 - 0.04 K/UL    DF AUTOMATED     METABOLIC PANEL, COMPREHENSIVE    Collection Time: 02/06/18 11:25 AM   Result Value Ref Range    Sodium 133 (L) 136 - 145 mmol/L    Potassium 4.0 3.5 - 5.1 mmol/L    Chloride 99 97 - 108 mmol/L    CO2 25 21 - 32 mmol/L    Anion gap 9 5 - 15 mmol/L    Glucose 135 (H) 65 - 100 mg/dL    BUN 20 6 - 20 MG/DL    Creatinine 1.28 0.70 - 1.30 MG/DL    BUN/Creatinine ratio 16 12 - 20      GFR est AA >60 >60 ml/min/1.73m2    GFR est non-AA >60 >60 ml/min/1.73m2    Calcium 10.0 8.5 - 10.1 MG/DL    Bilirubin, total 0.7 0.2 - 1.0 MG/DL    ALT (SGPT) 35 12 - 78 U/L    AST (SGOT) 24 15 - 37 U/L    Alk.  phosphatase 97 45 - 117 U/L    Protein, total 9.3 (H) 6.4 - 8.2 g/dL    Albumin 4.3 3.5 - 5.0 g/dL    Globulin 5.0 (H) 2.0 - 4.0 g/dL    A-G Ratio 0.9 (L) 1.1 - 2.2     TROPONIN I    Collection Time: 02/06/18 11:25 AM   Result Value Ref Range    Troponin-I, Qt. <0.04 <0.05 ng/mL   CK W/ CKMB & INDEX    Collection Time: 02/06/18 11:25 AM   Result Value Ref Range     (H) 39 - 308 U/L    CK - MB 10.8 (H) <3.6 NG/ML    CK-MB Index 3.3 (H) 0 - 2.5     LIPASE    Collection Time: 02/06/18 11:25 AM   Result Value Ref Range    Lipase 155 73 - 393 U/L   PROTHROMBIN TIME + INR    Collection Time: 02/06/18 11:25 AM   Result Value Ref Range    INR 1.0 0.9 - 1.1      Prothrombin time 9.6 9.0 - 11.1 sec   NT-PRO BNP    Collection Time: 02/06/18 11:25 AM   Result Value Ref Range    NT pro- (H) 0 - 125 PG/ML   PTT    Collection Time: 02/06/18 11:25 AM   Result Value Ref Range    aPTT 30.8 22.1 - 32.5 sec aPTT, therapeutic range     58.0 - 77.0 SECS   URINALYSIS W/MICROSCOPIC    Collection Time: 02/06/18 12:31 PM   Result Value Ref Range    Color YELLOW/STRAW      Appearance CLEAR CLEAR      Specific gravity 1.014 1.003 - 1.030      pH (UA) 6.0 5.0 - 8.0      Protein 100 (A) NEG mg/dL    Glucose NEGATIVE  NEG mg/dL    Ketone NEGATIVE  NEG mg/dL    Bilirubin NEGATIVE  NEG      Blood TRACE (A) NEG      Urobilinogen 0.2 0.2 - 1.0 EU/dL    Nitrites NEGATIVE  NEG      Leukocyte Esterase SMALL (A) NEG      WBC 20-50 0 - 4 /hpf    RBC 0-5 0 - 5 /hpf    Epithelial cells FEW FEW /lpf    Bacteria NEGATIVE  NEG /hpf    Hyaline cast 0-2 0 - 5 /lpf   URINE CULTURE HOLD SAMPLE    Collection Time: 02/06/18 12:31 PM   Result Value Ref Range    Urine culture hold        URINE ON HOLD IN MICROBIOLOGY DEPT FOR 3 DAYS. IF UNPRESERVED URINE IS SUBMITTED, IT CANNOT BE USED FOR ADDITIONAL TESTING AFTER 24 HRS, RECOLLECTION WILL BE REQUIRED.         Assessment:     Assessment:      Active Problems:    Unstable angina (HCC) (2/22/2012)    CP; consistent with classic unstable angina; concerned that same LCX-OM system restenosed  CAD; s/p CABG and stents   H/o MI  HTN  hyperlipidemia     Plan:   Tele  NTP  Lovenox  ASA/BB  He needs cardiac cath in am    Signed By: Fatuma Aguirre MD     February 6, 2018

## 2018-02-06 NOTE — ED PROVIDER NOTES
HPI Comments: 56 y.o. male with past medical history significant for CAD s/p 3 vs CABG (2011), NSTEMI, DM, HTN, and endocarditis who presents ambulatory from home with chief complaint of chest pain. Pt reports acute onset of chest pain last night that has persisted despite taking 3 nitroglycerin. Pt vomited once this morning. Pt reports exertional chest pain after ambulating \"less than 1/4 block\" over the last few days that has been relieved with nitroglycerin. Pt notes he recently quit his job and suspects stress may be a contributing factor to his symptoms. Pt regularly takes carvedilol, asa, lisinopril, atorvastatin, and Ticagrelor. Pt denies cardiac evaluation since last admission in March 2017. Pt smokes \"less than 1\" cigarette per day. Pt specifically denies diarrhea and constipation. There are no other acute medical concerns at this time. Social hx: former tobacco use; occasional EtOH use; denies illicit drug use (hx marijuana, cocaine, IV heroin use);   PCP: None  Cardiology: Hannah Peres MD    Note written by Armida Wolfe, as dictated by Tom Grimes MD 11:21 AM         The history is provided by the patient and medical records. No  was used. Past Medical History:   Diagnosis Date    CAD (coronary artery disease)     Diabetes ([address]     \"prediabetes\" Was suppos to go clinic at  [facility] 211 Endocarditis     Hypertension     MI (myocardial infarction)        Past Surgical History:   Procedure Laterality Date    CARDIAC SURG PROCEDURE UNLIST      cath with stent placement feb 2012    CARDIAC SURG PROCEDURE UNLIST      CABGx4         Family History:   Problem Relation Age of Onset    Stroke Father        Social History     Social History    Marital status: SINGLE     Spouse name: N/A    Number of children: N/A    Years of education: N/A     Occupational History    Not on file.      Social History Main Topics    Smoking status: Former Smoker     Quit date: 1/19/2012    Smokeless tobacco: Never Used    Alcohol use Yes      Comment: occassionl    Drug use: No      Comment: quit maijezekiela about 2 weeks ago/ prev use cocaine, heroin IV    Sexual activity: Not Currently     Partners: Female     Other Topics Concern    Not on file     Social History Narrative         ALLERGIES: Tramadol    Review of Systems   Cardiovascular: Positive for chest pain. Gastrointestinal: Positive for vomiting. Negative for constipation and diarrhea. All other systems reviewed and are negative. Vitals:    02/06/18 1112 02/06/18 1126 02/06/18 1200 02/06/18 1230   BP: (!) 120/91 126/83 (!) 142/91 (!) 140/91   Pulse: 87 83 75 78   Resp: 18 15 8 9   Temp: 98 °F (36.7 °C)      SpO2: 99%  97% 96%   Weight: 81.6 kg (180 lb)      Height: 5' 8\" (1.727 m)               Physical Exam   Constitutional: He is oriented to person, place, and time. He appears well-developed and well-nourished. No distress. Uncomfortable appearing, AxOx4, speaking in complete sentences   HENT:   Head: Normocephalic and atraumatic. Nose: Nose normal.   Mouth/Throat: Oropharynx is clear and moist. No oropharyngeal exudate. Cn intact    No facial droop/ slurred speech/ tongue deviation   Eyes: Conjunctivae and EOM are normal. Pupils are equal, round, and reactive to light. Right eye exhibits no discharge. Left eye exhibits no discharge. Neck: Normal range of motion. Neck supple. Cardiovascular: Normal rate, regular rhythm, normal heart sounds and intact distal pulses. Exam reveals no gallop and no friction rub. No murmur heard. Pulmonary/Chest: Effort normal and breath sounds normal. No respiratory distress. He has no wheezes. He has no rales. He exhibits no tenderness. Abdominal: Soft. Bowel sounds are normal. He exhibits no distension and no mass. There is no tenderness. There is no rebound and no guarding.    Nttp     Genitourinary:   Genitourinary Comments: Pt denies urinary/ Testicular/ scrotal or penile  complaints   Musculoskeletal: Normal range of motion. He exhibits no edema, tenderness or deformity. Lymphadenopathy:     He has no cervical adenopathy. Neurological: He is alert and oriented to person, place, and time. He has normal reflexes. No cranial nerve deficit. Coordination normal.   pt has motor/ CV/ Sensation grossly intact to all extremities, R = L in strength;   Skin: Skin is warm and dry. No rash noted. No erythema. Psychiatric: He has a normal mood and affect. Nursing note and vitals reviewed. [facility]      ED Course       Procedures    Chief Complaint   Patient presents with    Chest Pain       11:16 AM  The patients presenting problems have been discussed, and they are in agreement with the care plan formulated and outlined with them. I have encouraged them to ask questions as they arise throughout their visit. MEDICATIONS GIVEN:  Medications - No data to display    LABS REVIEWED:  Labs Reviewed - No data to display    RADIOLOGY RESULTS:  The following have been ordered and reviewed:  _____________________________________________________________________  _____________________________________________________________________    EKG interpretation: (Preliminary)  Rhythm: normal sinus rhythm; and regular . Rate (approx.): 80; Axis: normal; P wave: normal; QRS interval: normal ; ST/T wave: normal; Negative acute significant segmental elevations/ noted flipped t waves lead V3/ new when compared to study dated 03/19/2017    PROCEDURES:        CONSULTATIONS:       PROGRESS NOTES:      DIAGNOSIS:    1. Chest pain, unspecified type        PLAN:  1- CP - will be admitted by Dr Vaurn Kwon; Pt agrees w/ plans;       ED COURSE: The patients hospital course has been uncomplicated. 12:34 PM  'feeling better now. CP gone'; agrees w/ pending cardiology evaluation;      12:59 PM  Patient is being evaluated for admission to the hospital by Dr Varun Kwon .   The results of their tests and reasons for their admission have been discussed with them and/or available family. They convey agreement and understanding for the need to be admitted and for their admission diagnosis. Consultation has been made with the inpatient physician specialist for hospitalization.

## 2018-02-06 NOTE — ROUTINE PROCESS
TRANSFER - OUT REPORT:    Verbal report given to Alma DOYLE(name) on Jazlyn Specking  being transferred to Room 463 (unit) for routine progression of care       Report consisted of patients Situation, Background, Assessment and   Recommendations(SBAR). Information from the following report(s) SBAR, Kardex, ED Summary, MAR, Recent Results and Cardiac Rhythm NSR was reviewed with the receiving nurse. Lines:   Peripheral IV 02/06/18 Left Antecubital (Active)   Site Assessment Clean, dry, & intact 2/6/2018 11:27 AM   Phlebitis Assessment 0 2/6/2018 11:27 AM   Infiltration Assessment 0 2/6/2018 11:27 AM   Dressing Status Clean, dry, & intact 2/6/2018 11:27 AM   Hub Color/Line Status Flushed 2/6/2018 11:27 AM        Opportunity for questions and clarification was provided.       Patient transported with:   [employer]

## 2018-02-07 LAB
ATRIAL RATE: 81 BPM
CALCULATED P AXIS, ECG09: 58 DEGREES
CALCULATED R AXIS, ECG10: 68 DEGREES
CALCULATED T AXIS, ECG11: 148 DEGREES
DIAGNOSIS, 93000: NORMAL
GLUCOSE BLD STRIP.AUTO-MCNC: 176 MG/DL (ref 65–100)
GLUCOSE BLD STRIP.AUTO-MCNC: 181 MG/DL (ref 65–100)
GLUCOSE BLD STRIP.AUTO-MCNC: 188 MG/DL (ref 65–100)
P-R INTERVAL, ECG05: 166 MS
Q-T INTERVAL, ECG07: 370 MS
QRS DURATION, ECG06: 104 MS
QTC CALCULATION (BEZET), ECG08: 429 MS
SERVICE CMNT-IMP: ABNORMAL
TROPONIN I SERPL-MCNC: <0.04 NG/ML
VENTRICULAR RATE, ECG03: 81 BPM

## 2018-02-07 PROCEDURE — B2131ZZ FLUOROSCOPY OF MULTIPLE CORONARY ARTERY BYPASS GRAFTS USING LOW OSMOLAR CONTRAST: ICD-10-PCS | Performed by: INTERNAL MEDICINE

## 2018-02-07 PROCEDURE — C1894 INTRO/SHEATH, NON-LASER: HCPCS

## 2018-02-07 PROCEDURE — C1874 STENT, COATED/COV W/DEL SYS: HCPCS

## 2018-02-07 PROCEDURE — 4A023N7 MEASUREMENT OF CARDIAC SAMPLING AND PRESSURE, LEFT HEART, PERCUTANEOUS APPROACH: ICD-10-PCS | Performed by: INTERNAL MEDICINE

## 2018-02-07 PROCEDURE — 84484 ASSAY OF TROPONIN QUANT: CPT | Performed by: INTERNAL MEDICINE

## 2018-02-07 PROCEDURE — C1760 CLOSURE DEV, VASC: HCPCS

## 2018-02-07 PROCEDURE — 77030004533 HC CATH ANGI DX IMP BSC -B

## 2018-02-07 PROCEDURE — B2151ZZ FLUOROSCOPY OF LEFT HEART USING LOW OSMOLAR CONTRAST: ICD-10-PCS | Performed by: INTERNAL MEDICINE

## 2018-02-07 PROCEDURE — 74011000250 HC RX REV CODE- 250: Performed by: INTERNAL MEDICINE

## 2018-02-07 PROCEDURE — 99218 HC RM OBSERVATION: CPT

## 2018-02-07 PROCEDURE — 77030013715 HC INFL SYS MRTM -B

## 2018-02-07 PROCEDURE — 74011000250 HC RX REV CODE- 250

## 2018-02-07 PROCEDURE — 74011250637 HC RX REV CODE- 250/637: Performed by: INTERNAL MEDICINE

## 2018-02-07 PROCEDURE — C1887 CATHETER, GUIDING: HCPCS

## 2018-02-07 PROCEDURE — 74011636637 HC RX REV CODE- 636/637: Performed by: INTERNAL MEDICINE

## 2018-02-07 PROCEDURE — 74011000258 HC RX REV CODE- 258: Performed by: INTERNAL MEDICINE

## 2018-02-07 PROCEDURE — C1725 CATH, TRANSLUMIN NON-LASER: HCPCS

## 2018-02-07 PROCEDURE — 74011250636 HC RX REV CODE- 250/636: Performed by: INTERNAL MEDICINE

## 2018-02-07 PROCEDURE — 36415 COLL VENOUS BLD VENIPUNCTURE: CPT | Performed by: INTERNAL MEDICINE

## 2018-02-07 PROCEDURE — 99153 MOD SED SAME PHYS/QHP EA: CPT

## 2018-02-07 PROCEDURE — 65660000000 HC RM CCU STEPDOWN

## 2018-02-07 PROCEDURE — B2111ZZ FLUOROSCOPY OF MULTIPLE CORONARY ARTERIES USING LOW OSMOLAR CONTRAST: ICD-10-PCS | Performed by: INTERNAL MEDICINE

## 2018-02-07 PROCEDURE — 93459 L HRT ART/GRFT ANGIO: CPT

## 2018-02-07 PROCEDURE — 92921 HC PTCA SNGL MAJOR COR VESL/BRNCH  EA ADD LC: CPT

## 2018-02-07 PROCEDURE — 82962 GLUCOSE BLOOD TEST: CPT

## 2018-02-07 PROCEDURE — 74011250637 HC RX REV CODE- 250/637

## 2018-02-07 PROCEDURE — 027135Z DILATION OF CORONARY ARTERY, TWO ARTERIES WITH TWO DRUG-ELUTING INTRALUMINAL DEVICES, PERCUTANEOUS APPROACH: ICD-10-PCS | Performed by: INTERNAL MEDICINE

## 2018-02-07 PROCEDURE — 92937 PRQ TRLUML REVSC CAB GRF 1: CPT

## 2018-02-07 PROCEDURE — 74011636320 HC RX REV CODE- 636/320: Performed by: INTERNAL MEDICINE

## 2018-02-07 PROCEDURE — 99152 MOD SED SAME PHYS/QHP 5/>YRS: CPT

## 2018-02-07 PROCEDURE — C1769 GUIDE WIRE: HCPCS

## 2018-02-07 RX ORDER — HYDRALAZINE HYDROCHLORIDE 20 MG/ML
20 INJECTION INTRAMUSCULAR; INTRAVENOUS
Status: DISCONTINUED | OUTPATIENT
Start: 2018-02-07 | End: 2018-02-08 | Stop reason: HOSPADM

## 2018-02-07 RX ORDER — SODIUM CHLORIDE 0.9 % (FLUSH) 0.9 %
10 SYRINGE (ML) INJECTION AS NEEDED
Status: DISCONTINUED | OUTPATIENT
Start: 2018-02-07 | End: 2018-02-07

## 2018-02-07 RX ORDER — MIDAZOLAM HYDROCHLORIDE 1 MG/ML
1-10 INJECTION, SOLUTION INTRAMUSCULAR; INTRAVENOUS
Status: DISCONTINUED | OUTPATIENT
Start: 2018-02-07 | End: 2018-02-07

## 2018-02-07 RX ORDER — MAG HYDROX/ALUMINUM HYD/SIMETH 200-200-20
30 SUSPENSION, ORAL (FINAL DOSE FORM) ORAL
Status: DISCONTINUED | OUTPATIENT
Start: 2018-02-07 | End: 2018-02-08 | Stop reason: HOSPADM

## 2018-02-07 RX ORDER — FENTANYL CITRATE 50 UG/ML
25-200 INJECTION, SOLUTION INTRAMUSCULAR; INTRAVENOUS
Status: DISCONTINUED | OUTPATIENT
Start: 2018-02-07 | End: 2018-02-07

## 2018-02-07 RX ORDER — HEPARIN SODIUM 200 [USP'U]/100ML
1000 INJECTION, SOLUTION INTRAVENOUS CONTINUOUS
Status: DISCONTINUED | OUTPATIENT
Start: 2018-02-07 | End: 2018-02-07

## 2018-02-07 RX ORDER — LISINOPRIL 10 MG/1
10 TABLET ORAL DAILY
Status: DISCONTINUED | OUTPATIENT
Start: 2018-02-08 | End: 2018-02-08 | Stop reason: HOSPADM

## 2018-02-07 RX ORDER — MORPHINE SULFATE 4 MG/ML
3 INJECTION, SOLUTION INTRAMUSCULAR; INTRAVENOUS
Status: DISCONTINUED | OUTPATIENT
Start: 2018-02-07 | End: 2018-02-08 | Stop reason: HOSPADM

## 2018-02-07 RX ORDER — SODIUM CHLORIDE 0.9 % (FLUSH) 0.9 %
5-10 SYRINGE (ML) INJECTION AS NEEDED
Status: DISCONTINUED | OUTPATIENT
Start: 2018-02-07 | End: 2018-02-08 | Stop reason: HOSPADM

## 2018-02-07 RX ORDER — CLOPIDOGREL 300 MG/1
300-600 TABLET, FILM COATED ORAL AS NEEDED
Status: DISCONTINUED | OUTPATIENT
Start: 2018-02-07 | End: 2018-02-07

## 2018-02-07 RX ORDER — ALBUTEROL SULFATE 0.83 MG/ML
2.5 SOLUTION RESPIRATORY (INHALATION)
Status: DISCONTINUED | OUTPATIENT
Start: 2018-02-07 | End: 2018-02-08 | Stop reason: HOSPADM

## 2018-02-07 RX ORDER — ATROPINE SULFATE 0.1 MG/ML
1 INJECTION INTRAVENOUS AS NEEDED
Status: DISCONTINUED | OUTPATIENT
Start: 2018-02-07 | End: 2018-02-07

## 2018-02-07 RX ORDER — LIDOCAINE HYDROCHLORIDE 10 MG/ML
10-30 INJECTION INFILTRATION; PERINEURAL ONCE
Status: COMPLETED | OUTPATIENT
Start: 2018-02-07 | End: 2018-02-07

## 2018-02-07 RX ORDER — INSULIN GLARGINE 100 [IU]/ML
22 INJECTION, SOLUTION SUBCUTANEOUS EVERY 24 HOURS
Status: DISCONTINUED | OUTPATIENT
Start: 2018-02-07 | End: 2018-02-08 | Stop reason: HOSPADM

## 2018-02-07 RX ORDER — SODIUM CHLORIDE 0.9 % (FLUSH) 0.9 %
5-10 SYRINGE (ML) INJECTION EVERY 8 HOURS
Status: DISCONTINUED | OUTPATIENT
Start: 2018-02-07 | End: 2018-02-08 | Stop reason: HOSPADM

## 2018-02-07 RX ADMIN — NITROGLYCERIN 1 INCH: 20 OINTMENT TOPICAL at 13:42

## 2018-02-07 RX ADMIN — Medication 10 ML: at 07:02

## 2018-02-07 RX ADMIN — TICAGRELOR 180 MG: 90 TABLET ORAL at 09:10

## 2018-02-07 RX ADMIN — FENTANYL CITRATE 50 MCG: 50 INJECTION, SOLUTION INTRAMUSCULAR; INTRAVENOUS at 09:14

## 2018-02-07 RX ADMIN — LIDOCAINE HYDROCHLORIDE 10 ML: 10 INJECTION, SOLUTION INFILTRATION; PERINEURAL at 08:22

## 2018-02-07 RX ADMIN — HYDROCODONE BITARTRATE AND ACETAMINOPHEN 1 TABLET: 5; 325 TABLET ORAL at 10:36

## 2018-02-07 RX ADMIN — SODIUM CHLORIDE 3 ML/KG/HR: 900 INJECTION, SOLUTION INTRAVENOUS at 07:03

## 2018-02-07 RX ADMIN — MIDAZOLAM HYDROCHLORIDE 1 MG: 1 INJECTION, SOLUTION INTRAMUSCULAR; INTRAVENOUS at 08:18

## 2018-02-07 RX ADMIN — INSULIN GLARGINE 22 UNITS: 100 INJECTION, SOLUTION SUBCUTANEOUS at 13:42

## 2018-02-07 RX ADMIN — GABAPENTIN 600 MG: 600 TABLET, FILM COATED ORAL at 10:36

## 2018-02-07 RX ADMIN — MORPHINE SULFATE 3 MG: 4 INJECTION, SOLUTION INTRAMUSCULAR; INTRAVENOUS at 17:54

## 2018-02-07 RX ADMIN — IBUPROFEN 400 MG: 400 TABLET, FILM COATED ORAL at 04:45

## 2018-02-07 RX ADMIN — Medication 10 ML: at 13:42

## 2018-02-07 RX ADMIN — NITROGLYCERIN 200 MCG: 5 INJECTION, SOLUTION INTRAVENOUS at 08:46

## 2018-02-07 RX ADMIN — FENTANYL CITRATE 25 MCG: 50 INJECTION, SOLUTION INTRAMUSCULAR; INTRAVENOUS at 09:03

## 2018-02-07 RX ADMIN — LISINOPRIL 20 MG: 20 TABLET ORAL at 10:36

## 2018-02-07 RX ADMIN — Medication 10 ML: at 20:56

## 2018-02-07 RX ADMIN — HEPARIN SODIUM 2000 UNITS: 200 INJECTION, SOLUTION INTRAVENOUS at 08:06

## 2018-02-07 RX ADMIN — GABAPENTIN 600 MG: 600 TABLET, FILM COATED ORAL at 17:50

## 2018-02-07 RX ADMIN — TICAGRELOR 90 MG: 90 TABLET ORAL at 20:56

## 2018-02-07 RX ADMIN — HYDROCODONE BITARTRATE AND ACETAMINOPHEN 1 TABLET: 5; 325 TABLET ORAL at 18:49

## 2018-02-07 RX ADMIN — HYDROCODONE BITARTRATE AND ACETAMINOPHEN 1 TABLET: 5; 325 TABLET ORAL at 05:40

## 2018-02-07 RX ADMIN — LORAZEPAM 2 MG: 2 TABLET ORAL at 23:34

## 2018-02-07 RX ADMIN — NITROGLYCERIN 1 INCH: 20 OINTMENT TOPICAL at 23:33

## 2018-02-07 RX ADMIN — GABAPENTIN 600 MG: 600 TABLET, FILM COATED ORAL at 20:55

## 2018-02-07 RX ADMIN — MIDAZOLAM HYDROCHLORIDE 1 MG: 1 INJECTION, SOLUTION INTRAMUSCULAR; INTRAVENOUS at 08:42

## 2018-02-07 RX ADMIN — ASPIRIN 81 MG 81 MG: 81 TABLET ORAL at 09:07

## 2018-02-07 RX ADMIN — INSULIN LISPRO 16 UNITS: 100 INJECTION, SOLUTION INTRAVENOUS; SUBCUTANEOUS at 17:50

## 2018-02-07 RX ADMIN — MORPHINE SULFATE 3 MG: 4 INJECTION, SOLUTION INTRAMUSCULAR; INTRAVENOUS at 22:13

## 2018-02-07 RX ADMIN — MIDAZOLAM HYDROCHLORIDE 2 MG: 1 INJECTION, SOLUTION INTRAMUSCULAR; INTRAVENOUS at 08:07

## 2018-02-07 RX ADMIN — SODIUM CHLORIDE 1.5 ML/KG/HR: 900 INJECTION, SOLUTION INTRAVENOUS at 08:08

## 2018-02-07 RX ADMIN — BIVALIRUDIN 1.75 MG/KG/HR: 250 INJECTION, POWDER, LYOPHILIZED, FOR SOLUTION INTRAVENOUS at 08:34

## 2018-02-07 RX ADMIN — LORAZEPAM 2 MG: 2 TABLET ORAL at 14:56

## 2018-02-07 RX ADMIN — NITROGLYCERIN 1 INCH: 20 OINTMENT TOPICAL at 17:50

## 2018-02-07 RX ADMIN — HYDROCODONE BITARTRATE AND ACETAMINOPHEN 1 TABLET: 5; 325 TABLET ORAL at 01:35

## 2018-02-07 RX ADMIN — NITROGLYCERIN 1 INCH: 20 OINTMENT TOPICAL at 06:00

## 2018-02-07 RX ADMIN — INSULIN LISPRO 16 UNITS: 100 INJECTION, SOLUTION INTRAVENOUS; SUBCUTANEOUS at 13:41

## 2018-02-07 RX ADMIN — CALCIUM CARBONATE (ANTACID) CHEW TAB 500 MG 400 MG: 500 CHEW TAB at 10:36

## 2018-02-07 RX ADMIN — FENTANYL CITRATE 25 MCG: 50 INJECTION, SOLUTION INTRAMUSCULAR; INTRAVENOUS at 08:23

## 2018-02-07 RX ADMIN — IOPAMIDOL 50 ML: 755 INJECTION, SOLUTION INTRAVENOUS at 08:35

## 2018-02-07 RX ADMIN — FENTANYL CITRATE 50 MCG: 50 INJECTION, SOLUTION INTRAMUSCULAR; INTRAVENOUS at 08:07

## 2018-02-07 RX ADMIN — CARVEDILOL 12.5 MG: 12.5 TABLET, FILM COATED ORAL at 10:36

## 2018-02-07 RX ADMIN — Medication 1 CAPSULE: at 13:40

## 2018-02-07 RX ADMIN — CARVEDILOL 12.5 MG: 12.5 TABLET, FILM COATED ORAL at 17:50

## 2018-02-07 RX ADMIN — MIDAZOLAM HYDROCHLORIDE 1 MG: 1 INJECTION, SOLUTION INTRAMUSCULAR; INTRAVENOUS at 08:53

## 2018-02-07 RX ADMIN — ALBUTEROL SULFATE 2.5 MG: 2.5 SOLUTION RESPIRATORY (INHALATION) at 19:38

## 2018-02-07 RX ADMIN — FENTANYL CITRATE 25 MCG: 50 INJECTION, SOLUTION INTRAMUSCULAR; INTRAVENOUS at 08:18

## 2018-02-07 RX ADMIN — ROSUVASTATIN CALCIUM 40 MG: 40 TABLET, FILM COATED ORAL at 20:55

## 2018-02-07 RX ADMIN — IOPAMIDOL 124 ML: 755 INJECTION, SOLUTION INTRAVENOUS at 09:14

## 2018-02-07 NOTE — PROGRESS NOTES
1131:  TRANSFER - IN REPORT:    Verbal report received from Jagruti Rivers on Rolan Valenzuela , from the Cardiac Cath lab, for routine progression of care. Report consisted of patients Situation, Background, Assessment and Recommendations(SBAR). Information from the following report(s) Procedure Summary, Intake/Output, MAR and Recent Results was reviewed with the receiving clinician. Opportunity for questions and clarification was provided. Assessment completed upon patients arrival to [address] [place] [address] and care assumed. Cardiac Cath Lab Recovery Arrival Note:     Rolan Valenzuela arrived to [facility] recovery area. Patient procedure= LHC. Patient on cardiac monitor, non-invasive blood pressure, Patient status doing well without problems. Patient is A&Ox 4. Patient reports no complaints. Procedure site without any bleeding and no hematoma.

## 2018-02-07 NOTE — PROCEDURES
Cardiac Catheterization Procedure Note   Patient: Connie Chavira  MRN: [id number]  SSN: [id number]   YOB: 1970 Age: 56 y.o. Sex: male    Date of Procedure: 2/7/2018   Pre-procedure Diagnosis: Unstable Angina  Post-procedure Diagnosis: Coronary Artery Disease  Procedure: PCI  :  Dr. Rod Patel MD    Assistant(s):  None  Anesthesia: Moderate Sedation   Estimated Blood Loss: Less than 10 mL   Specimens Removed: None  Findings: LVG; EF 45 to 50% with moderate inferior basal hypokinesis; LM is OK; RCA is occluded; SVG-RCA is previously known occluded; LIMA-LAD is patent;  Native LAD is patent but totally occluded at mid where previous stent is; SVG-OM1/OM2 is tightly re-stenosed at insertion ( one inserted in 6/17 ) and today had a double wire and POBA to OM2 and stented across over the insertion with 2.75x22 [place] and proximal graft body lesion stented today with 3.5x22 [place] with excellent result; OM2 with residual 60% after POBA with 1.5 balloon but good flow  Complications: None   Implants:  None  Signed by:  Rod Patel MD  2/7/2018  9:24 AM

## 2018-02-07 NOTE — CARDIO/PULMONARY
Cardiac Rehab: CAD education folder given to Aguilar Johnson.      Educated using teach back method. Reviewed CAD diagnosis definition and purpose of intervention. Discussed risk factors for CAD to include the following: family history, elevated BMI, hyperlipidemia, hypertension, diabetes, stress, and smoking. Discussed Heart Healthy/Low Sodium (2000 mg) diet. Reviewed the importance of medication compliance, the purpose of COREG, and potential side effects. Discussed follow up appointments with cardiologist, signs and symptoms of angina, and what to report to physician after discharge. Emphasized the value of cardiac rehab. Discussed Cardiac Rehab Program format, benefits, and encouraged enrollment to assist with risk modification and management. [facility] but he stated\" I can barely walk to the dumter never mind exercise. \" Desean Griffin contact information will be documented on his AVS.  Aguilar Johnson verbalized understanding with questions answered.  Gonzalez Floyd RN

## 2018-02-07 NOTE — PROGRESS NOTES
TRANSFER - OUT REPORT:    Verbal report given to CAROLRN(name) on Jose Up  being transferred to [facility](unit) for routine progression of care       Report consisted of patients Situation, Background, Assessment and   Recommendations(SBAR). Information from the following report(s) Procedure Summary, Intake/Output, MAR and Recent Results was reviewed with the receiving nurse. Lines:   Peripheral IV 02/06/18 Left Antecubital (Active)   Site Assessment Clean, dry, & intact 2/6/2018  7:35 PM   Phlebitis Assessment 0 2/6/2018  7:35 PM   Infiltration Assessment 0 2/6/2018  7:35 PM   Dressing Status Clean, dry, & intact 2/6/2018  7:35 PM   Dressing Type Tape;Transparent 2/6/2018  7:35 PM   Hub Color/Line Status Pink;Capped 2/6/2018  7:35 PM   Action Taken Open ports on tubing capped 2/6/2018  7:35 PM   Alcohol Cap Used Yes 2/6/2018  7:35 PM        Opportunity for questions and clarification was provided.       Patient transported with:   Monitor  O2 @ 1 liters  Registered Nurse

## 2018-02-07 NOTE — PROGRESS NOTES
CM met with patient per his request- lives with his girlfriend who he is primary caregiver for - she has had a stroke and right now he has a friend staying with her - patient is inquiring about further financial assistance - food stamps? Disability? Etc.  - [facility] [place] encouraged him to also have his PCP put in an order to be seen by a  at the [place] - patient is agreeable to transfer to [address] [place] [address] if needed as [facility] [place] [address] [place] [address] is paying for his hospital stay - transfer request form signed by patient - called [address] [place] [address] center and made [facility] aware of admit to [address] [place] [address] and she requested information to be faxed to them at [phone]. Patient receives his meds from [address] [place] [address] will follow-up in am with providing patient with community resources per his request. LEVAR Sue    Care Management Interventions  PCP Verified by CM:  Yes ([facility]  [address] Mikemityennifer Dennyjo: No  Discharge Durable Medical Equipment: No (uses no DME)  Physical Therapy Consult: No  Occupational Therapy Consult: No  Speech Therapy Consult: No  Current Support Network: Own Home  Confirm Follow Up Transport: Family  Plan discussed with Pt/Family/Caregiver: Yes  Discharge Location  Discharge Placement: Home

## 2018-02-07 NOTE — PROGRESS NOTES
2021:  Cardiac Cath Lab Recovery Arrival Note:      Edgar Martin arrived to Cardiac Cath Lab, Recovery Area. Staff introduced to patient. Patient identifiers verified with NAME and DATE OF BIRTH. Procedure verified with patient. Consent forms reviewed and signed by patient or authorized representative and verified. Allergies verified. Patient informed of procedure and plan of care. Questions answered with review. Patient prepped for procedure, per orders from physician, prior to arrival.    Patient on cardiac monitor, non-invasive blood pressure, SPO2 monitor. Patient is A&Ox 4. Patient reports no complaints. Patient in stretcher, in low position, with side rails up, call bell within reach, patient instructed to call of assistance as needed. Patient prep in: [facility] Recovery Area, Bed# 6. Family in: not present. Prep by: MARTINEZ Valdovinos

## 2018-02-07 NOTE — PROGRESS NOTES
1930: Bedside and Verbal shift change report given to Scooby Son RN (oncoming nurse) by Marta Saucedo (offgoing nurse). Report included the following information SBAR, Kardex, Intake/Output, MAR, Recent Results and Cardiac Rhythm NSR.   0700: TRANSFER - OUT REPORT:    Verbal report given to IVVEK Mac(name) on Vince Yan  being transferred to [facility](unit) for ordered procedure       Report consisted of patients Situation, Background, Assessment and   Recommendations(SBAR). Information from the following report(s) SBAR, Kardex, Intake/Output, MAR, Recent Results and Cardiac Rhythm NSR was reviewed with the receiving nurse. Lines:   Peripheral IV 02/06/18 Left Antecubital (Active)   Site Assessment Clean, dry, & intact 2/6/2018  7:35 PM   Phlebitis Assessment 0 2/6/2018  7:35 PM   Infiltration Assessment 0 2/6/2018  7:35 PM   Dressing Status Clean, dry, & intact 2/6/2018  7:35 PM   Dressing Type Tape;Transparent 2/6/2018  7:35 PM   Hub Color/Line Status Pink;Capped 2/6/2018  7:35 PM   Action Taken Open ports on tubing capped 2/6/2018  7:35 PM   Alcohol Cap Used Yes 2/6/2018  7:35 PM        Opportunity for questions and clarification was provided. Patient transported with:   Monitor  Registered Nurse  0730: Bedside and Verbal shift change report given to VIVEK MEDINA (oncoming nurse) by Scooby Son RN (offgoing nurse). Report included the following information SBAR, Kardex, Intake/Output, MAR, Recent Results and Cardiac Rhythm NSR. Problem: Falls - Risk of  Goal: *Absence of Falls  Document Jillian Fall Risk and appropriate interventions in the flowsheet.    Outcome: Progressing Towards Goal  Fall Risk Interventions:            Medication Interventions: Teach patient to arise slowly

## 2018-02-07 NOTE — PROGRESS NOTES
Cardiac Cath Lab Procedure Area Arrival Note:    Jus Willard arrived to Cardiac Cath Lab, Procedure Area. Patient identifiers verified with NAME and DATE OF BIRTH. Procedure verified with patient. Consent forms verified. Allergies verified. Patient informed of procedure and plan of care. Questions answered with review. Patient voiced understanding of procedure and plan of care. Patient on cardiac monitor, non-invasive blood pressure, SPO2 monitor. On room air then placed on O2 @ 2 lpm via NC.  IV of normal saline on pump at 125 ml/hr. Patient status doing well without problems. Patient is A&Ox 4. Patient reports no pain at this time. Patient medicated during procedure with orders obtained and verified by Dr. Elo Cary. Refer to patients Cardiac Cath Lab PROCEDURE REPORT for vital signs, assessment, status, and response during procedure, printed at end of case. Printed report on chart or scanned into chart.

## 2018-02-07 NOTE — PHYSICIAN ADVISORY
Letter of Status Determination:   Recommend hospitalization status upgraded from   OBSERVATION  to INPATIENT  Status     Pt Name:  Jus Willard   MR#   [address] [facility] # [id number] /  [id number]   [facility]#  [id number]   Room and Hospital  463/01  @ [facility]   Hospitalization date  2/6/2018 11:13 AM   Current Attending Physician  Pa Howard MD   Principal diagnosis  <principal problem not specified>   Unstable angina    Clinicals  56 y.o. y.o  male hospitalized with above diagnosis   The pt is known to have CAD s/p CABG. He is now s/p cardiac cath and PCI with stent and balloon angioplasty of the partially occluded coronary artery. [place] ([facility]) criteria   Does  NOT apply    STATUS DETERMINATION  Based on documented presenting clinical data, comorbid conditions, high risk of adverse events and deterioration, it is our recommendation that the patient's status should be upgraded from OBSERVATION to INPATIENT status. The final decision of the patient's hospitalization status depends on the attending physician's judgment. Additional comments     Payor: Rosangela Stacy / Plan: Ana Lilia Wiggins / Product Type: Federal Funded Programs /         Ros Mcdonough MD MPH FACP     Physician Advisor    [address]   President [facility] Staff, [address] [place] [address]    Cell  [phone]        [phone]    .

## 2018-02-08 VITALS
RESPIRATION RATE: 18 BRPM | DIASTOLIC BLOOD PRESSURE: 83 MMHG | BODY MASS INDEX: 28.13 KG/M2 | TEMPERATURE: 97.7 F | HEART RATE: 69 BPM | OXYGEN SATURATION: 98 % | SYSTOLIC BLOOD PRESSURE: 154 MMHG | WEIGHT: 185.63 LBS | HEIGHT: 68 IN

## 2018-02-08 LAB
GLUCOSE BLD STRIP.AUTO-MCNC: 217 MG/DL (ref 65–100)
SERVICE CMNT-IMP: ABNORMAL

## 2018-02-08 PROCEDURE — 74011636637 HC RX REV CODE- 636/637: Performed by: INTERNAL MEDICINE

## 2018-02-08 PROCEDURE — 74011250637 HC RX REV CODE- 250/637: Performed by: INTERNAL MEDICINE

## 2018-02-08 PROCEDURE — 94761 N-INVAS EAR/PLS OXIMETRY MLT: CPT

## 2018-02-08 PROCEDURE — 82962 GLUCOSE BLOOD TEST: CPT

## 2018-02-08 PROCEDURE — 74011250636 HC RX REV CODE- 250/636: Performed by: INTERNAL MEDICINE

## 2018-02-08 RX ADMIN — MORPHINE SULFATE 3 MG: 4 INJECTION, SOLUTION INTRAMUSCULAR; INTRAVENOUS at 06:26

## 2018-02-08 RX ADMIN — LORAZEPAM 2 MG: 2 TABLET ORAL at 08:26

## 2018-02-08 RX ADMIN — Medication 10 ML: at 06:26

## 2018-02-08 RX ADMIN — MORPHINE SULFATE 3 MG: 4 INJECTION, SOLUTION INTRAMUSCULAR; INTRAVENOUS at 10:57

## 2018-02-08 RX ADMIN — LISINOPRIL 10 MG: 10 TABLET ORAL at 08:27

## 2018-02-08 RX ADMIN — CALCIUM CARBONATE (ANTACID) CHEW TAB 500 MG 400 MG: 500 CHEW TAB at 08:26

## 2018-02-08 RX ADMIN — ASPIRIN 81 MG 81 MG: 81 TABLET ORAL at 08:27

## 2018-02-08 RX ADMIN — MORPHINE SULFATE 3 MG: 4 INJECTION, SOLUTION INTRAMUSCULAR; INTRAVENOUS at 02:21

## 2018-02-08 RX ADMIN — TICAGRELOR 90 MG: 90 TABLET ORAL at 08:26

## 2018-02-08 RX ADMIN — INSULIN LISPRO 16 UNITS: 100 INJECTION, SOLUTION INTRAVENOUS; SUBCUTANEOUS at 08:27

## 2018-02-08 RX ADMIN — HYDROCODONE BITARTRATE AND ACETAMINOPHEN 1 TABLET: 5; 325 TABLET ORAL at 08:34

## 2018-02-08 RX ADMIN — Medication 1 CAPSULE: at 08:26

## 2018-02-08 RX ADMIN — CARVEDILOL 12.5 MG: 12.5 TABLET, FILM COATED ORAL at 08:27

## 2018-02-08 RX ADMIN — GABAPENTIN 600 MG: 600 TABLET, FILM COATED ORAL at 08:27

## 2018-02-08 NOTE — DISCHARGE SUMMARY
Cardiology Discharge Summary     Patient ID:  Madi Michaels  [id number]  13 y.o.  1970    Admit Date: 2/6/2018    Discharge Date: 2/8/2018     Admitting Physician: Yumiko Stacy MD     Discharge Physician: Yumiko Stacy MD    Admission Diagnoses: Unstable angina [facility])  Unstable angina [facility])    Discharge Diagnoses: Active Problems:    Unstable angina ([address] (2/22/2012)    s/p stents in LCX graft  Ischemic CMY; EF 45%  HTN  H/o CABG  H/o multiple stents  Smoking abuse  hyperlipidemia    Discharge Condition: Stable    Cardiology Procedures this Admission:  Left heart catheterization with PCI    Hospital Course: He came in with [place] and abnormal ekg; His cath showed native 3 vs occlusions; patent LIMA-LAD and previously known occluded SVG-RCA; SVG-LCX was the culprit vessel with tight restenosis of stent that was inserted last [facility] at [facility] at insertion of graft at bifurcating OM2/OM3. I treated OM3 with POBA using 1.5 balloon through the struts of the stent as it was trapped and then placed a long 2.75x22 Uzair stent stenting across the bifurcating OM3 again. The proximal portion of LCX graft needed additional stent as it has severe degenerative lesion, 3.5x22 Uzair. Overall the result looked good and EF was 45%. He is released in stable condition with no complication at the groin. Consults: None    Discharge Exam:     Visit Vitals    /73 (BP 1 Location: Right arm, BP Patient Position: At rest)    Pulse 76    Temp 97.8 °F (36.6 °C)    Resp 16    Ht 5' 8\" (1.727 m)    Wt 84.2 kg (185 lb 10 oz)    SpO2 95%    BMI 28.22 kg/m2     General Appearance:  Well developed, well nourished, in no acute distress. Ears/Nose/Mouth/Throat:   Hearing grossly normal.         Neck: Supple. Chest:   Lungs clear to auscultation bilaterally. Normal resp effort. Cardiovascular:  Regular rate and rhythm, S1, S2 normal, no new murmur. Abdomen:   Soft, non-tender, bowel sounds are present. Extremities: No edema bilaterally. Neuro:  Skin: A/O x 3, grossly nonfocal. Normal mood/affect. Warm and dry. Disposition: home    Patient Instructions:   Current Discharge Medication List      START taking these medications    Details   ticagrelor (BRILINTA) 90 mg tablet Take 1 Tab by mouth every twelve (12) hours every twelve (12) hours. Qty: 60 Tab, Refills: 6            CONTINUE these medications which have NOT CHANGED    Details   ticagrelor (BRILINTA) 90 mg tablet Take 90 mg by mouth two (2) times a day. rosuvastatin (CRESTOR) 40 mg tablet Take 40 mg by mouth daily. albuterol (PROVENTIL HFA, VENTOLIN HFA, PROAIR HFA) 90 mcg/actuation inhaler Take 2 Puffs by inhalation every four (4) hours as needed for Wheezing. omega 3-dha-epa-fish oil (FISH OIL) 100-160-1,000 mg cap Take 1,000 mg by mouth daily. alum-mag hydroxide-simeth (MYLANTA) 200-200-20 mg/5 mL susp Take 30 mL by mouth three (3) times daily as needed. lisinopril (PRINIVIL, ZESTRIL) 20 mg tablet Take 10 mg by mouth daily. carvedilol (COREG) 12.5 mg tablet Take 12.5 mg by mouth two (2) times daily (with meals). gabapentin (NEURONTIN) 600 mg tablet Take 600 mg by mouth three (3) times daily. Indications: NEUROPATHIC PAIN      insulin glargine (LANTUS) 100 unit/mL injection 22 Units by SubCUTAneous route daily. Indications: takes in the am      insulin aspart (NOVOLOG FLEXPEN) 100 unit/mL inpn 14 Units by SubCUTAneous route Before breakfast, lunch, and dinner. Indications: Gestational Diabetes Mellitus      ibuprofen (MOTRIN) 400 mg tablet Take 1 tablet by mouth every six (6) hours as needed for Pain. Qty: 20 tablet, Refills: 0      aspirin 81 mg chewable tablet Take 81 mg by mouth daily. nitroglycerin (NITROSTAT) 0.4 mg SL tablet by SubLINGual route every five (5) minutes as needed. calcium carbonate (TUMS) 200 mg calcium (500 mg) Chew Take 2 Tabs by mouth as needed.               STOP taking these medications       LORazepam (ATIVAN) 2 mg tablet Comments:   Reason for Stopping:         hydrocodone-acetaminophen (NORCO) 5-325 mg per tablet Comments:   Reason for Stopping:               Referenced discharge instructions provided by nursing for diet and activity.     Follow-up with BRITTANI HARVEY [facility] in two weeks     Signed:  Stephany Casillas MD  2/8/2018  6:16 AM

## 2018-02-08 NOTE — PROGRESS NOTES
1930: Bedside and Verbal shift change report given to Nils Dejesus RN (oncoming nurse) by VIVEK MEDINA (offgoing nurse). Report included the following information SBAR, Kardex, Intake/Output, MAR, Recent Results and Cardiac Rhythm NSR.   0730: Bedside and Verbal shift change report given to VIVEK MEDINA (oncoming nurse) by Nils Dejesus RN (offgoing nurse). Report included the following information SBAR, Kardex, Intake/Output, MAR, Recent Results and Cardiac Rhythm NSR. Problem: Falls - Risk of  Goal: *Absence of Falls  Document Jillian Fall Risk and appropriate interventions in the flowsheet.    Outcome: Progressing Towards Goal  Fall Risk Interventions:            Medication Interventions: Teach patient to arise slowly

## 2018-02-08 NOTE — PROGRESS NOTES
1055: Pt declined to have 5 day follow up appointment made on his behalf post discharge at the [address] [place] [address]. Pt explained he has to check his schedule and will make himself.

## 2018-02-08 NOTE — ACP (ADVANCE CARE PLANNING)
Responded to request from  Alisson Muñoz to provide an AMD consult. Consulted with Alex Chow upon arrival in unit and arrived at room 463 to find Mr. Mandi Wayne (Natalio Nader) alone in room. The initial conversation was a briefing on the purpose and use of an Advanced Medical Directive. He suggested his adult son as a likely candidate to serve as MPOA. However upon further discussion, it was revealed that he is estranged from his son (and former wife). Provided him with the opportunity to reflect on the inherent issues with approaching his son as MPOA. Upon reflection he decided that a colleague with whom he has a close relationship is a better choice. Offered him a copy of the booklet \"Your Right to Decide\" and two copies of blank AMD forms to bring home and complete. Suggested that he could return a copy to [facility] admitting and have it entered into his medical record. [address] [facility]'s Staff  (Jaswinder 5 Patient Care Specialist)   Paging Service [phone])

## 2018-02-08 NOTE — PROGRESS NOTES
02/08/18 1000   Six Minute Walk Report (PRE)   Heart Rate 79   O2 Saturation 98   Six Minute Walk Report (POST)   Heart Rate 78   O2 Saturation 98   Distance Walked in Feet (ft) 854 ft.    Distance in Meters 260.3 meters
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
## 2018-02-08 NOTE — DISCHARGE INSTRUCTIONS
[email or web address]    Patient Discharge Instructions    Erika Flower / [id number] : 1970    Admitted 2018 Discharged: 2018       · It is important that you take the medication exactly as they are prescribed. · Keep your medication in the bottles provided by the pharmacist and keep a list of the medication names, dosages, and times to be taken in your wallet. · Do not take other medications without consulting your doctor. BRING ALL OF YOUR MEDICINES TO YOUR OFFICE VISIT with Cardiologist at [facility]    Follow-up with Tim Camacho MD in 2 week      Cardiac Catheterization  Discharge Instructions     Do not drive, operate any machinery, or sign any legal documents for 24 hours after your procedure. You must have someone to drive you home.  You may take a shower 24 hours after your cardiac catheterization. Be sure to get the dressing wet and then remove it; gently wash the area with warm soapy water. Pat dry and leave open to air. To help prevent infections, be sure to keep the cath site clean and dry. No lotions, creams, powders, ointments, etc. in the cath site for approximately 1 week.  Do not take a tub bath, get in a hot tub or swimming pool for approximately 5 days or until the cath site is completely healed.  No strenuous activity or heavy lifting over 10 lbs. for 7 days.  Drink plenty of fluids for 24-48 hours after your cath to flush the contrast dye from your kidneys. No alcoholic beverages for 24 hours. You may resume your previous diet (low fat, low cholesterol) after your cath.  After your cath, some bruising or discomfort is common during the healing process. Tylenol, 1-2 tablets every 6 hours as needed, is recommended if you experience any discomfort.   If you experience any signs or symptoms of infection such as fever, chills, or poorly healing incision, persistent tenderness or swelling in the groin, redness and/or warmth to the touch, numbness, significant tingling or pain at the groin site or affected extremity, rash, drainage from the cath site, or if the leg feels tight or swollen, call your physician right away.  If bleeding at the cath site occurs, take a clean gauze pad and apply direct pressure to the groin just above the puncture site. Call 911 immediately, and continue to apply direct pressure until an ambulance gets to your location.  You may return to work  2  days after your cardiac cath if no groin bleeding. Information obtained by :  I understand that if any problems occur once I am at home I am to contact my physician. I understand and acknowledge receipt of the instructions indicated above.                                                                                                                                            R.N.'s Signature                                                                  Date/Time                                                                                                                                              Patient or Representative Signature                                                          Date/Time      Celestina Souza MD

## 2018-02-08 NOTE — PROGRESS NOTES
0730: Bedside and Verbal shift change report given to VIVEK MEDINA (oncoming nurse) by Shadi Singh RN (offgoing nurse). Report included the following information SBAR, Kardex, Intake/Output, MAR, Recent Results, Med Rec Status and Cardiac Rhythm NSR. TRANSFER - OUT REPORT:    Verbal report given to [facility] RN (name) on Sridhar Tee  being transferred to [facility] (unit) for ordered procedure       Report consisted of patients Situation, Background, Assessment and   Recommendations(SBAR). Information from the following report(s) SBAR, Kardex, Intake/Output, MAR, Recent Results, Med Rec Status and Cardiac Rhythm NSR was reviewed with the receiving nurse. Lines:   Peripheral IV 02/06/18 Left Antecubital (Active)   Site Assessment Clean, dry, & intact 2/6/2018  7:35 PM   Phlebitis Assessment 0 2/6/2018  7:35 PM   Infiltration Assessment 0 2/6/2018  7:35 PM   Dressing Status Clean, dry, & intact 2/6/2018  7:35 PM   Dressing Type Tape;Transparent 2/6/2018  7:35 PM   Hub Color/Line Status Pink;Capped 2/6/2018  7:35 PM   Action Taken Open ports on tubing capped 2/6/2018  7:35 PM   Alcohol Cap Used Yes 2/6/2018  7:35 PM        Opportunity for questions and clarification was provided. Patient transported with:   Monitor  Registered Nurse   56: TRANSFER - IN REPORT:    Verbal report received from  [address] (name) on Sridhar Tee  being received from [facility] (unit) for routine progression of care      Report consisted of patients Situation, Background, Assessment and   Recommendations(SBAR). Information from the following report(s) SBAR, Kardex, Intake/Output, MAR, Recent Results, Med Rec Status and Cardiac Rhythm NSR was reviewed with the receiving nurse. Opportunity for questions and clarification was provided. Assessment completed upon patients arrival to unit and care assumed. 1930: Bedside shift change report given to Shadi Singh RN (oncoming nurse) by VIVEK MEDINA (offgoing nurse).  Report included the following information SBAR, Kardex, Intake/Output, MAR, Recent Results, Med Rec Status and Cardiac Rhythm NSR.

## 2018-02-08 NOTE — PROGRESS NOTES
CM spoke with patient per his request about medicaid and that CM made a referral to [facility] to see if they can screen patient - he provided paperwork for CM to give to [facility] - message sent to [facility] for CM to provide them with paperwork as they will provide financial screening.   Aysha Medina, MSW

## 2018-08-07 ENCOUNTER — APPOINTMENT (OUTPATIENT)
Dept: GENERAL RADIOLOGY | Age: 48
DRG: 247 | End: 2018-08-07
Attending: EMERGENCY MEDICINE
Payer: OTHER GOVERNMENT

## 2018-08-07 ENCOUNTER — HOSPITAL ENCOUNTER (INPATIENT)
Age: 48
LOS: 1 days | Discharge: HOME | DRG: 247 | End: 2018-08-09
Attending: EMERGENCY MEDICINE | Admitting: HOSPITALIST
Payer: OTHER GOVERNMENT

## 2018-08-07 DIAGNOSIS — R07.9 ACUTE CHEST PAIN: Primary | ICD-10-CM

## 2018-08-07 DIAGNOSIS — R73.9 HYPERGLYCEMIA: ICD-10-CM

## 2018-08-07 LAB
ALBUMIN SERPL-MCNC: 3 G/DL (ref 3.5–5)
ALBUMIN/GLOB SERPL: 0.9 {RATIO} (ref 1.1–2.2)
ALP SERPL-CCNC: 105 U/L (ref 45–117)
ALT SERPL-CCNC: 49 U/L (ref 12–78)
ANION GAP SERPL CALC-SCNC: 13 MMOL/L (ref 5–15)
AST SERPL-CCNC: 34 U/L (ref 15–37)
BASOPHILS # BLD: 0.1 K/UL (ref 0–0.1)
BASOPHILS NFR BLD: 1 % (ref 0–1)
BILIRUB SERPL-MCNC: 0.4 MG/DL (ref 0.2–1)
BUN SERPL-MCNC: 23 MG/DL (ref 6–20)
BUN/CREAT SERPL: 13 (ref 12–20)
CALCIUM SERPL-MCNC: 7 MG/DL (ref 8.5–10.1)
CHLORIDE SERPL-SCNC: 105 MMOL/L (ref 97–108)
CO2 SERPL-SCNC: 18 MMOL/L (ref 21–32)
CREAT SERPL-MCNC: 1.77 MG/DL (ref 0.7–1.3)
DIFFERENTIAL METHOD BLD: ABNORMAL
EOSINOPHIL # BLD: 0.3 K/UL (ref 0–0.4)
EOSINOPHIL NFR BLD: 3 % (ref 0–7)
ERYTHROCYTE [DISTWIDTH] IN BLOOD BY AUTOMATED COUNT: 13.4 % (ref 11.5–14.5)
GLOBULIN SER CALC-MCNC: 3.2 G/DL (ref 2–4)
GLUCOSE SERPL-MCNC: 323 MG/DL (ref 65–100)
HCT VFR BLD AUTO: 36.1 % (ref 36.6–50.3)
HGB BLD-MCNC: 12.9 G/DL (ref 12.1–17)
IMM GRANULOCYTES # BLD: 0 K/UL (ref 0–0.04)
IMM GRANULOCYTES NFR BLD AUTO: 1 % (ref 0–0.5)
LYMPHOCYTES # BLD: 2.6 K/UL (ref 0.8–3.5)
LYMPHOCYTES NFR BLD: 32 % (ref 12–49)
MCH RBC QN AUTO: 34.5 PG (ref 26–34)
MCHC RBC AUTO-ENTMCNC: 35.7 G/DL (ref 30–36.5)
MCV RBC AUTO: 96.5 FL (ref 80–99)
MONOCYTES # BLD: 0.5 K/UL (ref 0–1)
MONOCYTES NFR BLD: 6 % (ref 5–13)
NEUTS SEG # BLD: 4.7 K/UL (ref 1.8–8)
NEUTS SEG NFR BLD: 57 % (ref 32–75)
NRBC # BLD: 0 K/UL (ref 0–0.01)
NRBC BLD-RTO: 0 PER 100 WBC
PLATELET # BLD AUTO: 230 K/UL (ref 150–400)
PMV BLD AUTO: 10.2 FL (ref 8.9–12.9)
POTASSIUM SERPL-SCNC: 4.4 MMOL/L (ref 3.5–5.1)
PROT SERPL-MCNC: 6.2 G/DL (ref 6.4–8.2)
RBC # BLD AUTO: 3.74 M/UL (ref 4.1–5.7)
SODIUM SERPL-SCNC: 136 MMOL/L (ref 136–145)
TROPONIN I BLD-MCNC: <0.04 NG/ML (ref 0–0.08)
TROPONIN I SERPL-MCNC: <0.05 NG/ML
WBC # BLD AUTO: 8.2 K/UL (ref 4.1–11.1)

## 2018-08-07 PROCEDURE — 96375 TX/PRO/DX INJ NEW DRUG ADDON: CPT

## 2018-08-07 PROCEDURE — 96374 THER/PROPH/DIAG INJ IV PUSH: CPT

## 2018-08-07 PROCEDURE — 84484 ASSAY OF TROPONIN QUANT: CPT | Performed by: EMERGENCY MEDICINE

## 2018-08-07 PROCEDURE — 36415 COLL VENOUS BLD VENIPUNCTURE: CPT | Performed by: EMERGENCY MEDICINE

## 2018-08-07 PROCEDURE — 74011250637 HC RX REV CODE- 250/637: Performed by: EMERGENCY MEDICINE

## 2018-08-07 PROCEDURE — 74011250636 HC RX REV CODE- 250/636: Performed by: EMERGENCY MEDICINE

## 2018-08-07 PROCEDURE — 96376 TX/PRO/DX INJ SAME DRUG ADON: CPT

## 2018-08-07 PROCEDURE — 85025 COMPLETE CBC W/AUTO DIFF WBC: CPT | Performed by: EMERGENCY MEDICINE

## 2018-08-07 PROCEDURE — 74011250637 HC RX REV CODE- 250/637

## 2018-08-07 PROCEDURE — 93005 ELECTROCARDIOGRAM TRACING: CPT

## 2018-08-07 PROCEDURE — 74011000250 HC RX REV CODE- 250: Performed by: EMERGENCY MEDICINE

## 2018-08-07 PROCEDURE — 80053 COMPREHEN METABOLIC PANEL: CPT | Performed by: EMERGENCY MEDICINE

## 2018-08-07 PROCEDURE — 71045 X-RAY EXAM CHEST 1 VIEW: CPT

## 2018-08-07 PROCEDURE — 99285 EMERGENCY DEPT VISIT HI MDM: CPT

## 2018-08-07 RX ORDER — FENTANYL CITRATE 50 UG/ML
50 INJECTION, SOLUTION INTRAMUSCULAR; INTRAVENOUS
Status: COMPLETED | OUTPATIENT
Start: 2018-08-07 | End: 2018-08-07

## 2018-08-07 RX ORDER — NITROGLYCERIN 0.4 MG/1
0.4 TABLET SUBLINGUAL
Status: DISCONTINUED | OUTPATIENT
Start: 2018-08-07 | End: 2018-08-09 | Stop reason: HOSPADM

## 2018-08-07 RX ORDER — FENTANYL CITRATE 50 UG/ML
50 INJECTION, SOLUTION INTRAMUSCULAR; INTRAVENOUS ONCE
Status: COMPLETED | OUTPATIENT
Start: 2018-08-07 | End: 2018-08-07

## 2018-08-07 RX ORDER — FOLIC ACID 1 MG/1
1 TABLET ORAL DAILY
COMMUNITY

## 2018-08-07 RX ORDER — NITROGLYCERIN 0.4 MG/1
TABLET SUBLINGUAL
Status: COMPLETED
Start: 2018-08-07 | End: 2018-08-07

## 2018-08-07 RX ORDER — METOPROLOL TARTRATE 5 MG/5ML
5 INJECTION INTRAVENOUS
Status: COMPLETED | OUTPATIENT
Start: 2018-08-07 | End: 2018-08-07

## 2018-08-07 RX ORDER — SERTRALINE HYDROCHLORIDE 100 MG/1
50 TABLET, FILM COATED ORAL DAILY
COMMUNITY

## 2018-08-07 RX ADMIN — NITROGLYCERIN 0.4 MG: 0.4 TABLET SUBLINGUAL at 22:41

## 2018-08-07 RX ADMIN — METOPROLOL TARTRATE 5 MG: 5 INJECTION, SOLUTION INTRAVENOUS at 21:48

## 2018-08-07 RX ADMIN — FENTANYL CITRATE 50 MCG: 50 INJECTION, SOLUTION INTRAMUSCULAR; INTRAVENOUS at 22:41

## 2018-08-07 RX ADMIN — FENTANYL CITRATE 50 MCG: 50 INJECTION, SOLUTION INTRAMUSCULAR; INTRAVENOUS at 23:48

## 2018-08-07 RX ADMIN — NITROGLYCERIN 0.4 MG: 0.4 TABLET SUBLINGUAL at 21:35

## 2018-08-07 NOTE — IP AVS SNAPSHOT
[address] [facility] [address] 
[phone] Patient: Miguelina Scott 
MRN: [id number] CWW:[id number]/[id number] A check dirk indicates which time of day the medication should be taken. My Medications CHANGE how you take these medications Instructions Each Dose to Equal  
 Morning Noon Evening Bedtime  
 lisinopril 20 mg tablet Commonly known as:  Lenyjung Pelaez Start taking on:  8/10/2018 What changed:  how much to take Your last dose was: Your next dose is: Take 1 Tab by mouth daily. 20 mg CONTINUE taking these medications Instructions Each Dose to Equal  
 Morning Noon Evening Bedtime  
 albuterol 90 mcg/actuation inhaler Commonly known as:  PROVENTIL HFA, VENTOLIN HFA, PROAIR HFA Your last dose was: Your next dose is: Take 2 Puffs by inhalation every four (4) hours as needed for Wheezing. 2 Puff  
    
   
   
   
  
 alum-mag hydroxide-simeth 200-200-20 mg/5 mL Susp Commonly known as:  MYLANTA Your last dose was: Your next dose is: Take 30 mL by mouth three (3) times daily as needed. 30 mL  
    
   
   
   
  
 aspirin 81 mg chewable tablet Your last dose was: Your next dose is: Take 81 mg by mouth daily. 81 mg  
    
   
   
   
  
 calcium carbonate 200 mg calcium (500 mg) Chew Commonly known as:  TUMS Your last dose was: Your next dose is: Take 2 Tabs by mouth as needed. 2 Tab  
    
   
   
   
  
 carvedilol 12.5 mg tablet Commonly known as:  Bk [place] Your last dose was: Your next dose is: Take 25 mg by mouth two (2) times daily (with meals). 25 mg FISH -160-1,000 mg Cap Generic drug:  omega 3-dha-epa-fish oil Your last dose was: Your next dose is: Take 1,000 mg by mouth daily. 1000 mg  
    
   
   
   
  
 folic acid 1 mg tablet Commonly known as:  Google Your last dose was: Your next dose is: Take  by mouth daily. gabapentin 600 mg tablet Commonly known as:  NEURONTIN Your last dose was: Your next dose is: Take 600 mg by mouth three (3) times daily. Indications: NEUROPATHIC PAIN  
 600 mg  
    
   
   
   
  
 ibuprofen 400 mg tablet Commonly known as:  MOTRIN Your last dose was: Your next dose is: Take 1 tablet by mouth every six (6) hours as needed for Pain. 400 mg  
    
   
   
   
  
 LANTUS U-100 INSULIN 100 unit/mL injection Generic drug:  insulin glargine Your last dose was: Your next dose is:    
   
   
 22 Units by SubCUTAneous route daily. Indications: takes in the am  
 22 Units  
    
   
   
   
  
 nitroglycerin 0.4 mg SL tablet Commonly known as:  NITROSTAT Your last dose was: Your next dose is:    
   
   
 by SubLINGual route every five (5) minutes as needed. NovoLOG Flexpen U-100 Insulin 100 unit/mL Inpn Generic drug:  insulin aspart U-100 Your last dose was: Your next dose is:    
   
   
 14 Units by SubCUTAneous route Before breakfast, lunch, and dinner. Indications: Gestational Diabetes Mellitus 14 Units  
    
   
   
   
  
 rosuvastatin 40 mg tablet Commonly known as:  CRESTOR Your last dose was: Your next dose is: Take 40 mg by mouth daily. 40 mg  
    
   
   
   
  
 sertraline 100 mg tablet Commonly known as:  ZOLOFT Your last dose was: Your next dose is: Take 100 mg by mouth daily. 100 mg  
    
   
   
   
  
 * ticagrelor 90 mg tablet Commonly known as:  [employer] Your last dose was: Your next dose is: Take 90 mg by mouth two (2) times a day. 90 mg  
    
   
   
   
  
 * ticagrelor 90 mg tablet Commonly known as:  [employer] Your last dose was: Your next dose is: Take 1 Tab by mouth every twelve (12) hours every twelve (12) hours. 90 mg * Notice: This list has 2 medication(s) that are the same as other medications prescribed for you. Read the directions carefully, and ask your doctor or other care provider to review them with you. Where to Get Your Medications Information on where to get these meds will be given to you by the nurse or doctor. ! Ask your nurse or doctor about these medications  
  lisinopril 20 mg tablet

## 2018-08-07 NOTE — IP AVS SNAPSHOT
[address] [phone] Patient: Xander Jolly 
MRN: [id number] KXP:7/0/6973 About your hospitalization You were admitted on:  August 8, 2018 You last received care in the:  [facility] 4 CV SERVICES UNIT You were discharged on:  August 9, 2018 Why you were hospitalized Your primary diagnosis was:  Acute Chest Pain Your diagnoses also included:  Unstable Angina (Hcc), Dehydration, Type 2 Diabetes Mellitus With Hyperglycemia (Hcc) Follow-up Information Follow up With Details Comments Contact Info Call to enroll in Cardiac Rehab at [facility] Call today  [address] [facility] Cardiac Rehab contact # [phone] None   None (395) Patient stated that they have no PCP Discharge Orders None A check dirk indicates which time of day the medication should be taken. My Medications CHANGE how you take these medications Instructions Each Dose to Equal  
 Morning Noon Evening Bedtime  
 lisinopril 20 mg tablet Commonly known as:  Rosendo Brown Start taking on:  8/10/2018 What changed:  how much to take Your last dose was: Your next dose is: Take 1 Tab by mouth daily. 20 mg CONTINUE taking these medications Instructions Each Dose to Equal  
 Morning Noon Evening Bedtime  
 albuterol 90 mcg/actuation inhaler Commonly known as:  PROVENTIL HFA, VENTOLIN HFA, PROAIR HFA Your last dose was: Your next dose is: Take 2 Puffs by inhalation every four (4) hours as needed for Wheezing. 2 Puff  
    
   
   
   
  
 alum-mag hydroxide-simeth 200-200-20 mg/5 mL Susp Commonly known as:  MYLANTA Your last dose was: Your next dose is: Take 30 mL by mouth three (3) times daily as needed. 30 mL  
    
   
   
   
  
 aspirin 81 mg chewable tablet Your last dose was: Your next dose is: Take 81 mg by mouth daily. 81 mg  
    
   
   
   
  
 calcium carbonate 200 mg calcium (500 mg) Chew Commonly known as:  TUMS Your last dose was: Your next dose is: Take 2 Tabs by mouth as needed. 2 Tab  
    
   
   
   
  
 carvedilol 12.5 mg tablet Commonly known as:  Amanda Martinez Your last dose was: Your next dose is: Take 25 mg by mouth two (2) times daily (with meals). 25 mg FISH -160-1,000 mg Cap Generic drug:  omega 3-dha-epa-fish oil Your last dose was: Your next dose is: Take 1,000 mg by mouth daily. 1000 mg  
    
   
   
   
  
 folic acid 1 mg tablet Commonly known as:  Google Your last dose was: Your next dose is: Take  by mouth daily. gabapentin 600 mg tablet Commonly known as:  NEURONTIN Your last dose was: Your next dose is: Take 600 mg by mouth three (3) times daily. Indications: NEUROPATHIC PAIN  
 600 mg  
    
   
   
   
  
 ibuprofen 400 mg tablet Commonly known as:  MOTRIN Your last dose was: Your next dose is: Take 1 tablet by mouth every six (6) hours as needed for Pain. 400 mg  
    
   
   
   
  
 LANTUS U-100 INSULIN 100 unit/mL injection Generic drug:  insulin glargine Your last dose was: Your next dose is:    
   
   
 22 Units by SubCUTAneous route daily. Indications: takes in the am  
 22 Units  
    
   
   
   
  
 nitroglycerin 0.4 mg SL tablet Commonly known as:  NITROSTAT Your last dose was: Your next dose is:    
   
   
 by SubLINGual route every five (5) minutes as needed. NovoLOG Flexpen U-100 Insulin 100 unit/mL Inpn Generic drug:  insulin aspart U-100 Your last dose was: Your next dose is: 14 Units by SubCUTAneous route Before breakfast, lunch, and dinner. Indications: Gestational Diabetes Mellitus 14 Units  
    
   
   
   
  
 rosuvastatin 40 mg tablet Commonly known as:  CRESTOR Your last dose was: Your next dose is: Take 40 mg by mouth daily. 40 mg  
    
   
   
   
  
 sertraline 100 mg tablet Commonly known as:  ZOLOFT Your last dose was: Your next dose is: Take 100 mg by mouth daily. 100 mg  
    
   
   
   
  
 * ticagrelor 90 mg tablet Commonly known as:  [employer] Your last dose was: Your next dose is: Take 90 mg by mouth two (2) times a day. 90 mg  
    
   
   
   
  
 * ticagrelor 90 mg tablet Commonly known as:  [employer] Your last dose was: Your next dose is: Take 1 Tab by mouth every twelve (12) hours every twelve (12) hours. 90 mg * Notice: This list has 2 medication(s) that are the same as other medications prescribed for you. Read the directions carefully, and ask your doctor or other care provider to review them with you. Where to Get Your Medications Information on where to get these meds will be given to you by the nurse or doctor. ! Ask your nurse or doctor about these medications  
  lisinopril 20 mg tablet Discharge Instructions None [facility] Announcement We are excited to announce that we are making your provider's discharge notes available to you in Backup[employer]t. You will see these notes when they are completed and signed by the physician that discharged you from your recent hospital stay. If you have any questions or concerns about any information you see in [facility], please call the Health Information Department where you were seen or reach out to your Primary Care Provider for more information about your plan of care. Introducing [facility] SERVICES! [employer] introduces Spoond[employer]et patient portal. Now you can access parts of your medical record, email your doctor's office, and request medication refills online. 1. In your internet browser, go to [email or web address] 2. Click on the First Time User? Click Here link in the Sign In box. You will see the New Member Sign Up page. 3. Enter your [employer] Access Code exactly as it appears below. You will not need to use this code after youve completed the sign-up process. If you do not sign up before the expiration date, you must request a new code. · [employer] Access Code: [email or web address] Expires: 11/5/2018  9:07 PM 
 
4. Enter the last four digits of your Social Security Number (xxxx) and Date of Birth (mm/dd/yyyy) as indicated and click Submit. You will be taken to the next sign-up page. 5. Create a [employer] ID. This will be your [employer] login ID and cannot be changed, so think of one that is secure and easy to remember. 6. Create a [employer] password. You can change your password at any time. 7. Enter your Password Reset Question and Answer. This can be used at a later time if you forget your password. 8. Enter your e-mail address. You will receive e-mail notification when new information is available in [address] 9. Click Sign Up. You can now view and download portions of your medical record. 10. Click the Download Summary menu link to download a portable copy of your medical information. If you have questions, please visit the Frequently Asked Questions section of the [employer] website. Remember, [employer] is NOT to be used for urgent needs. For medical emergencies, dial 911. Now available from your iPhone and Android! Introducing Ab Espinoza As a [employer] patient, I wanted to make you aware of our electronic visit tool called Ab Espinoza. [employer] 24/7 allows you to connect within minutes with a medical provider 24 hours a day, seven days a week via a mobile device or tablet or logging into a secure website from your computer. You can access [facility] from anywhere in the [place]. A virtual visit might be right for you when you have a simple condition and feel like you just dont want to get out of bed, or cant get away from work for an appointment, when your regular [address] [place] [address] provider is not available (evenings, weekends or holidays), or when youre out of town and need minor care. Electronic visits cost only $49 and if the [address] [place] [address] 24/7 provider determines a prescription is needed to treat your condition, one can be electronically transmitted to a nearby pharmacy*. Please take a moment to enroll today if you have not already done so. The enrollment process is free and takes just a few minutes. To enroll, please download the [facility] 24/7 lissa to your tablet or phone, or visit [email or web address] to enroll on your computer. And, as an [address] [place] [address] patient with a [employer] account, the results of your visits will be scanned into your electronic medical record and your primary care provider will be able to view the scanned results. We urge you to continue to see your regular [address] [place] [address] provider for your ongoing medical care. And while your primary care provider may not be the one available when you seek a Ab Whitmankhushboo virtual visit, the peace of mind you get from getting a real diagnosis real time can be priceless. For more information on Ab ReliOnradhafin, view our Frequently Asked Questions (FAQs) at [email or web address]. Sincerely, 
 
Merle Luna MD 
Chief Medical Officer [employer] *:  certain medications cannot be prescribed via Ab J Carloskhushboo Providers Seen During Your Hospitalization Provider Specialty Primary office phone Wade Rai MD Emergency Medicine [phone] Valdemar Olguin MD Family Practice [phone] Thaddeus Simental MD Internal Medicine [phone] Melecio Winkler MD Family Practice [phone] Your Primary Care Physician (PCP) Primary Care Physician Office Phone Office Fax NONE ** None ** ** None ** You are allergic to the following Allergen Reactions Tramadol Itching Pt reports he is not allergic to this Recent Documentation Height Weight BMI Smoking Status 1.727 m 91.7 kg 30.74 kg/m2 Former Smoker Emergency Contacts Name Discharge Info Relation Home Work Mobile Diann Blue DISCHARGE CAREGIVER [3] Friend [5]   [phone] Patient Belongings The following personal items are in your possession at time of discharge: 
  Dental Appliances: None  Visual Aid: None      Home Medications: Locked   Jewelry: None  Clothing: Shirt, Shorts, At bedside, With patient, Socks    Other Valuables: Vera Tolentino Please provide this summary of care documentation to your next provider. Signatures-by signing, you are acknowledging that this After Visit Summary has been reviewed with you and you have received a copy. Patient Signature:  ____________________________________________________________ Date:  ____________________________________________________________  
  
Saida Portillo Provider Signature:  ____________________________________________________________ Date:  ____________________________________________________________

## 2018-08-07 NOTE — IP AVS SNAPSHOT
Summary of Care Report The Summary of Care report has been created to help improve care coordination. Users with access to [employer] or [address] (Web-based application) may access additional patient information including the Discharge Summary. If you are not currently a [address] user and need more information, please call the number listed below in the [place] [address] section and ask to be connected with Medical Records. Facility Information Name Address Phone [address] [id number] [phone] Patient Information Patient Name Sex DORY Franz Seen ([id number]) Male 1970 Discharge Information Admitting Provider Service Area Unit Bruno Mercado MD / [address] Unit / [phone] Discharge Provider Discharge Date/Time Discharge Disposition Destination (none) 2018 Afternoon (Pending) AHR (none) Patient Language Language ENGLISH [13] Hospital Problems as of 2018  Reviewed: 2018 12:10 AM by Josephine Varghese MD  
  
  
  
 Class Noted - Resolved Last Modified POA Active Problems Unstable angina ([address]  2012 - Present 2018 by Josephine Varghese MD Unknown Entered by Mary Carson MD  
  Dehydration  2018 - Present 2018 by Josephine Varghese MD Unknown Entered by Josephine Varghese MD  
  * (Principal)Acute chest pain  2018 - Present 2018 by Josephine Varghese MD Unknown Entered by Josephine Varghese MD  
  Type 2 diabetes mellitus with hyperglycemia ([address]  2018 - Present 2018 by Josephine Varghese MD Unknown Entered by Josephine Varghese MD  
  
Non-Hospital Problems as of 2018  Reviewed: 2018 12:10 AM by Josephine Varghese MD  
  
  
  
 Class Noted - Resolved Last Modified Active Problems Chest pain at rest  2/22/2012 - Present 2/24/2012 Entered by Eugenio Merchant MD  
  CAD (coronary artery disease)  2/22/2012 - Present 2/24/2012 Entered by Eugenio Merchant MD  
  Overview Signed 2/22/2012  9:09 AM by Eugenio Merchant MD  
   [facility] showed 3v cad and had bare metal stent to distal rca. Normal lvef. HTN (hypertension)  2/22/2012 - Present 2/24/2012 Entered by Eugenio Merhcant MD  
  Hyperlipidemia  2/22/2012 - Present 2/24/2012 Entered by Eugenio Merchant MD  
  Acute non Q wave MI (myocardial infarction), initial episode of care [facility])  2/23/2012 - Present 2/24/2012 Entered by Eugenio Merchant MD  
  NSTEMI (non-ST elevated myocardial infarction) ([facility] [address] [facility]  3/18/2017 - Present 3/18/2017 by Delio Cobb MD  
  Entered by Delio Cobb MD  
  
You are allergic to the following Allergen Reactions Tramadol Itching Pt reports he is not allergic to this Current Discharge Medication List  
  
CONTINUE these medications which have CHANGED Dose & Instructions Dispensing Information Comments  
 lisinopril 20 mg tablet Commonly known as:  Jd Meek Start taking on:  8/10/2018 What changed:  how much to take Dose:  20 mg Take 1 Tab by mouth daily. Quantity:  30 Tab Refills:  0 CONTINUE these medications which have NOT CHANGED Dose & Instructions Dispensing Information Comments  
 albuterol 90 mcg/actuation inhaler Commonly known as:  PROVENTIL HFA, VENTOLIN HFA, PROAIR HFA Dose:  2 Puff Take 2 Puffs by inhalation every four (4) hours as needed for Wheezing. Refills:  0  
   
 alum-mag hydroxide-simeth 200-200-20 mg/5 mL Susp Commonly known as:  MYLANTA Dose:  30 mL Take 30 mL by mouth three (3) times daily as needed. Refills:  0  
   
 aspirin 81 mg chewable tablet Dose:  81 mg Take 81 mg by mouth daily. Refills:  0 calcium carbonate 200 mg calcium (500 mg) Chew Commonly known as:  TUMS Dose:  2 Tab Take 2 Tabs by mouth as needed. Refills:  0  
   
 carvedilol 12.5 mg tablet Commonly known as:  Wileen Escudero Dose:  25 mg Take 25 mg by mouth two (2) times daily (with meals). Refills:  0  
   
 FISH -160-1,000 mg Cap Generic drug:  omega 3-dha-epa-fish oil Dose:  1000 mg Take 1,000 mg by mouth daily. Refills:  0  
   
 folic acid 1 mg tablet Commonly known as:  Google Take  by mouth daily. Refills:  0  
   
 gabapentin 600 mg tablet Commonly known as:  NEURONTIN Dose:  600 mg Take 600 mg by mouth three (3) times daily. Indications: NEUROPATHIC PAIN Refills:  0  
   
 ibuprofen 400 mg tablet Commonly known as:  MOTRIN Dose:  400 mg Take 1 tablet by mouth every six (6) hours as needed for Pain. Quantity:  20 tablet Refills:  0  
   
 LANTUS U-100 INSULIN 100 unit/mL injection Generic drug:  insulin glargine Dose:  22 Units 22 Units by SubCUTAneous route daily. Indications: takes in the am  
 Refills:  0  
   
 nitroglycerin 0.4 mg SL tablet Commonly known as:  NITROSTAT  
 by SubLINGual route every five (5) minutes as needed. Refills:  0 NovoLOG Flexpen U-100 Insulin 100 unit/mL Inpn Generic drug:  insulin aspart U-100 Dose:  14 Units 14 Units by SubCUTAneous route Before breakfast, lunch, and dinner. Indications: Gestational Diabetes Mellitus Refills:  0  
   
 rosuvastatin 40 mg tablet Commonly known as:  CRESTOR Dose:  40 mg Take 40 mg by mouth daily. Refills:  0  
   
 sertraline 100 mg tablet Commonly known as:  ZOLOFT Dose:  100 mg Take 100 mg by mouth daily. Refills:  0  
   
 * ticagrelor 90 mg tablet Commonly known as:  [employer] Dose:  90 mg Take 90 mg by mouth two (2) times a day. Refills:  0  
   
 * ticagrelor 90 mg tablet Commonly known as:  [employer]  Dose:  90 mg  
 Take 1 Tab by mouth every twelve (12) hours every twelve (12) hours. Quantity:  60 Tab Refills:  6  
   
 * Notice: This list has 2 medication(s) that are the same as other medications prescribed for you. Read the directions carefully, and ask your doctor or other care provider to review them with you. Current Immunizations Name Date Influenza Vaccine Whole 12/22/2011 TD Vaccine 2/22/2000 Tdap 10/9/2014 ZZZ-RETIRED (DO NOT USE) Pneumococcal Vaccine (Unspecified Type) 2/22/2012 Follow-up Information Follow up With Details Comments Contact Info Call to enroll in Cardiac Rehab at [facility] Call today  [address] [facility] Cardiac Rehab contact # [phone] None   None (395) Patient stated that they have no PCP Discharge Instructions None Chart Review Routing History No Routing History on File

## 2018-08-08 PROBLEM — E11.65 TYPE 2 DIABETES MELLITUS WITH HYPERGLYCEMIA (HCC): Status: ACTIVE | Noted: 2018-08-08

## 2018-08-08 PROBLEM — R07.9 ACUTE CHEST PAIN: Status: ACTIVE | Noted: 2018-08-08

## 2018-08-08 PROBLEM — E86.0 DEHYDRATION: Status: ACTIVE | Noted: 2018-08-08

## 2018-08-08 LAB
ANION GAP SERPL CALC-SCNC: 9 MMOL/L (ref 5–15)
APPEARANCE UR: CLEAR
ATRIAL RATE: 87 BPM
ATRIAL RATE: 92 BPM
ATRIAL RATE: 95 BPM
BACTERIA URNS QL MICRO: NEGATIVE /HPF
BASOPHILS # BLD: 0.1 K/UL (ref 0–0.1)
BASOPHILS NFR BLD: 1 % (ref 0–1)
BILIRUB UR QL: NEGATIVE
BUN SERPL-MCNC: 24 MG/DL (ref 6–20)
BUN/CREAT SERPL: 16 (ref 12–20)
CALCIUM SERPL-MCNC: 8.1 MG/DL (ref 8.5–10.1)
CALCULATED P AXIS, ECG09: 54 DEGREES
CALCULATED P AXIS, ECG09: 59 DEGREES
CALCULATED P AXIS, ECG09: 62 DEGREES
CALCULATED R AXIS, ECG10: 72 DEGREES
CALCULATED R AXIS, ECG10: 73 DEGREES
CALCULATED R AXIS, ECG10: 76 DEGREES
CALCULATED T AXIS, ECG11: -135 DEGREES
CALCULATED T AXIS, ECG11: -162 DEGREES
CALCULATED T AXIS, ECG11: 170 DEGREES
CHLORIDE SERPL-SCNC: 105 MMOL/L (ref 97–108)
CK SERPL-CCNC: 207 U/L (ref 39–308)
CK SERPL-CCNC: 207 U/L (ref 39–308)
CO2 SERPL-SCNC: 20 MMOL/L (ref 21–32)
COLOR UR: ABNORMAL
CREAT SERPL-MCNC: 1.47 MG/DL (ref 0.7–1.3)
DIAGNOSIS, 93000: NORMAL
DIFFERENTIAL METHOD BLD: ABNORMAL
EOSINOPHIL # BLD: 0.4 K/UL (ref 0–0.4)
EOSINOPHIL NFR BLD: 4 % (ref 0–7)
EPITH CASTS URNS QL MICRO: ABNORMAL /LPF
ERYTHROCYTE [DISTWIDTH] IN BLOOD BY AUTOMATED COUNT: 13.3 % (ref 11.5–14.5)
GLUCOSE BLD STRIP.AUTO-MCNC: 282 MG/DL (ref 65–100)
GLUCOSE BLD STRIP.AUTO-MCNC: 308 MG/DL (ref 65–100)
GLUCOSE BLD STRIP.AUTO-MCNC: 311 MG/DL (ref 65–100)
GLUCOSE SERPL-MCNC: 279 MG/DL (ref 65–100)
GLUCOSE UR STRIP.AUTO-MCNC: >1000 MG/DL
HCT VFR BLD AUTO: 36 % (ref 36.6–50.3)
HGB BLD-MCNC: 12.3 G/DL (ref 12.1–17)
HGB UR QL STRIP: NEGATIVE
HYALINE CASTS URNS QL MICRO: ABNORMAL /LPF (ref 0–5)
IMM GRANULOCYTES # BLD: 0 K/UL (ref 0–0.04)
IMM GRANULOCYTES NFR BLD AUTO: 1 % (ref 0–0.5)
KETONES UR QL STRIP.AUTO: NEGATIVE MG/DL
LACTATE SERPL-SCNC: <0.3 MMOL/L (ref 0.4–2)
LEUKOCYTE ESTERASE UR QL STRIP.AUTO: NEGATIVE
LYMPHOCYTES # BLD: 3.3 K/UL (ref 0.8–3.5)
LYMPHOCYTES NFR BLD: 40 % (ref 12–49)
MAGNESIUM SERPL-MCNC: 2.1 MG/DL (ref 1.6–2.4)
MCH RBC QN AUTO: 33.3 PG (ref 26–34)
MCHC RBC AUTO-ENTMCNC: 34.2 G/DL (ref 30–36.5)
MCV RBC AUTO: 97.6 FL (ref 80–99)
MONOCYTES # BLD: 0.5 K/UL (ref 0–1)
MONOCYTES NFR BLD: 6 % (ref 5–13)
NEUTS SEG # BLD: 4 K/UL (ref 1.8–8)
NEUTS SEG NFR BLD: 49 % (ref 32–75)
NITRITE UR QL STRIP.AUTO: NEGATIVE
NRBC # BLD: 0 K/UL (ref 0–0.01)
NRBC BLD-RTO: 0 PER 100 WBC
P-R INTERVAL, ECG05: 172 MS
P-R INTERVAL, ECG05: 172 MS
P-R INTERVAL, ECG05: 176 MS
PH UR STRIP: 6 [PH] (ref 5–8)
PLATELET # BLD AUTO: 211 K/UL (ref 150–400)
PMV BLD AUTO: 10.3 FL (ref 8.9–12.9)
POTASSIUM SERPL-SCNC: 4.3 MMOL/L (ref 3.5–5.1)
PROT UR STRIP-MCNC: 100 MG/DL
Q-T INTERVAL, ECG07: 356 MS
Q-T INTERVAL, ECG07: 372 MS
Q-T INTERVAL, ECG07: 376 MS
QRS DURATION, ECG06: 100 MS
QRS DURATION, ECG06: 102 MS
QRS DURATION, ECG06: 104 MS
QTC CALCULATION (BEZET), ECG08: 428 MS
QTC CALCULATION (BEZET), ECG08: 464 MS
QTC CALCULATION (BEZET), ECG08: 467 MS
RBC # BLD AUTO: 3.69 M/UL (ref 4.1–5.7)
RBC #/AREA URNS HPF: ABNORMAL /HPF (ref 0–5)
SERVICE CMNT-IMP: ABNORMAL
SODIUM SERPL-SCNC: 134 MMOL/L (ref 136–145)
SP GR UR REFRACTOMETRY: 1.02 (ref 1–1.03)
TROPONIN I SERPL-MCNC: 0.3 NG/ML
TROPONIN I SERPL-MCNC: <0.05 NG/ML
UA: UC IF INDICATED,UAUC: ABNORMAL
UROBILINOGEN UR QL STRIP.AUTO: 0.2 EU/DL (ref 0.2–1)
VENTRICULAR RATE, ECG03: 87 BPM
VENTRICULAR RATE, ECG03: 92 BPM
VENTRICULAR RATE, ECG03: 95 BPM
WBC # BLD AUTO: 8.3 K/UL (ref 4.1–11.1)
WBC URNS QL MICRO: ABNORMAL /HPF (ref 0–4)

## 2018-08-08 PROCEDURE — C1769 GUIDE WIRE: HCPCS

## 2018-08-08 PROCEDURE — 93041 RHYTHM ECG TRACING: CPT

## 2018-08-08 PROCEDURE — 85025 COMPLETE CBC W/AUTO DIFF WBC: CPT | Performed by: FAMILY MEDICINE

## 2018-08-08 PROCEDURE — C1894 INTRO/SHEATH, NON-LASER: HCPCS

## 2018-08-08 PROCEDURE — B2111ZZ FLUOROSCOPY OF MULTIPLE CORONARY ARTERIES USING LOW OSMOLAR CONTRAST: ICD-10-PCS | Performed by: INTERNAL MEDICINE

## 2018-08-08 PROCEDURE — 36415 COLL VENOUS BLD VENIPUNCTURE: CPT | Performed by: FAMILY MEDICINE

## 2018-08-08 PROCEDURE — 92929 CARDIAC CATHETERIZATION: CPT

## 2018-08-08 PROCEDURE — 77030004533 HC CATH ANGI DX IMP BSC -B

## 2018-08-08 PROCEDURE — 027136Z DILATION OF CORONARY ARTERY, TWO ARTERIES WITH THREE DRUG-ELUTING INTRALUMINAL DEVICES, PERCUTANEOUS APPROACH: ICD-10-PCS | Performed by: INTERNAL MEDICINE

## 2018-08-08 PROCEDURE — 99218 HC RM OBSERVATION: CPT

## 2018-08-08 PROCEDURE — 77030013715 HC INFL SYS MRTM -B

## 2018-08-08 PROCEDURE — 80048 BASIC METABOLIC PNL TOTAL CA: CPT | Performed by: FAMILY MEDICINE

## 2018-08-08 PROCEDURE — 74011636320 HC RX REV CODE- 636/320: Performed by: INTERNAL MEDICINE

## 2018-08-08 PROCEDURE — 77030029065 HC DRSG HEMO QCLOT ZMED -B

## 2018-08-08 PROCEDURE — B2151ZZ FLUOROSCOPY OF LEFT HEART USING LOW OSMOLAR CONTRAST: ICD-10-PCS | Performed by: INTERNAL MEDICINE

## 2018-08-08 PROCEDURE — C1725 CATH, TRANSLUMIN NON-LASER: HCPCS

## 2018-08-08 PROCEDURE — 84484 ASSAY OF TROPONIN QUANT: CPT | Performed by: FAMILY MEDICINE

## 2018-08-08 PROCEDURE — 74011250637 HC RX REV CODE- 250/637: Performed by: HOSPITALIST

## 2018-08-08 PROCEDURE — 93306 TTE W/DOPPLER COMPLETE: CPT

## 2018-08-08 PROCEDURE — 77030013744

## 2018-08-08 PROCEDURE — C1887 CATHETER, GUIDING: HCPCS

## 2018-08-08 PROCEDURE — C1874 STENT, COATED/COV W/DEL SYS: HCPCS

## 2018-08-08 PROCEDURE — 82550 ASSAY OF CK (CPK): CPT | Performed by: FAMILY MEDICINE

## 2018-08-08 PROCEDURE — 81001 URINALYSIS AUTO W/SCOPE: CPT | Performed by: FAMILY MEDICINE

## 2018-08-08 PROCEDURE — 74011636637 HC RX REV CODE- 636/637: Performed by: INTERNAL MEDICINE

## 2018-08-08 PROCEDURE — 74011250637 HC RX REV CODE- 250/637: Performed by: INTERNAL MEDICINE

## 2018-08-08 PROCEDURE — 4A023N7 MEASUREMENT OF CARDIAC SAMPLING AND PRESSURE, LEFT HEART, PERCUTANEOUS APPROACH: ICD-10-PCS | Performed by: INTERNAL MEDICINE

## 2018-08-08 PROCEDURE — 74011250636 HC RX REV CODE- 250/636: Performed by: INTERNAL MEDICINE

## 2018-08-08 PROCEDURE — C1760 CLOSURE DEV, VASC: HCPCS

## 2018-08-08 PROCEDURE — 74011250636 HC RX REV CODE- 250/636: Performed by: FAMILY MEDICINE

## 2018-08-08 PROCEDURE — 74011250637 HC RX REV CODE- 250/637: Performed by: FAMILY MEDICINE

## 2018-08-08 PROCEDURE — 74011000250 HC RX REV CODE- 250: Performed by: INTERNAL MEDICINE

## 2018-08-08 PROCEDURE — 74011636637 HC RX REV CODE- 636/637: Performed by: FAMILY MEDICINE

## 2018-08-08 PROCEDURE — 74011000258 HC RX REV CODE- 258: Performed by: INTERNAL MEDICINE

## 2018-08-08 PROCEDURE — 65660000000 HC RM CCU STEPDOWN

## 2018-08-08 PROCEDURE — 83735 ASSAY OF MAGNESIUM: CPT | Performed by: FAMILY MEDICINE

## 2018-08-08 PROCEDURE — B2131ZZ FLUOROSCOPY OF MULTIPLE CORONARY ARTERY BYPASS GRAFTS USING LOW OSMOLAR CONTRAST: ICD-10-PCS | Performed by: INTERNAL MEDICINE

## 2018-08-08 PROCEDURE — 83605 ASSAY OF LACTIC ACID: CPT | Performed by: FAMILY MEDICINE

## 2018-08-08 PROCEDURE — 96376 TX/PRO/DX INJ SAME DRUG ADON: CPT

## 2018-08-08 PROCEDURE — 82962 GLUCOSE BLOOD TEST: CPT

## 2018-08-08 RX ORDER — INSULIN GLARGINE 100 [IU]/ML
25 INJECTION, SOLUTION SUBCUTANEOUS DAILY
Status: DISCONTINUED | OUTPATIENT
Start: 2018-08-09 | End: 2018-08-09 | Stop reason: HOSPADM

## 2018-08-08 RX ORDER — SODIUM CHLORIDE 0.9 % (FLUSH) 0.9 %
5-10 SYRINGE (ML) INJECTION AS NEEDED
Status: DISCONTINUED | OUTPATIENT
Start: 2018-08-08 | End: 2018-08-09 | Stop reason: HOSPADM

## 2018-08-08 RX ORDER — SODIUM CHLORIDE 0.9 % (FLUSH) 0.9 %
5-10 SYRINGE (ML) INJECTION EVERY 8 HOURS
Status: DISCONTINUED | OUTPATIENT
Start: 2018-08-08 | End: 2018-08-09 | Stop reason: HOSPADM

## 2018-08-08 RX ORDER — ALBUTEROL SULFATE 0.83 MG/ML
2.5 SOLUTION RESPIRATORY (INHALATION)
Status: DISCONTINUED | OUTPATIENT
Start: 2018-08-08 | End: 2018-08-09 | Stop reason: HOSPADM

## 2018-08-08 RX ORDER — ALBUTEROL SULFATE 90 UG/1
2 AEROSOL, METERED RESPIRATORY (INHALATION)
Status: DISCONTINUED | OUTPATIENT
Start: 2018-08-08 | End: 2018-08-08 | Stop reason: CLARIF

## 2018-08-08 RX ORDER — FENTANYL CITRATE 50 UG/ML
25 INJECTION, SOLUTION INTRAMUSCULAR; INTRAVENOUS
Status: DISCONTINUED | OUTPATIENT
Start: 2018-08-08 | End: 2018-08-08 | Stop reason: HOSPADM

## 2018-08-08 RX ORDER — ATROPINE SULFATE 0.1 MG/ML
.5-1 INJECTION INTRAVENOUS AS NEEDED
Status: DISCONTINUED | OUTPATIENT
Start: 2018-08-08 | End: 2018-08-08

## 2018-08-08 RX ORDER — GUAIFENESIN 100 MG/5ML
81 LIQUID (ML) ORAL DAILY
Status: DISCONTINUED | OUTPATIENT
Start: 2018-08-08 | End: 2018-08-09 | Stop reason: HOSPADM

## 2018-08-08 RX ORDER — LIDOCAINE HYDROCHLORIDE 10 MG/ML
10-30 INJECTION INFILTRATION; PERINEURAL
Status: DISCONTINUED | OUTPATIENT
Start: 2018-08-08 | End: 2018-08-08

## 2018-08-08 RX ORDER — IBUPROFEN 400 MG/1
400 TABLET ORAL
Status: DISCONTINUED | OUTPATIENT
Start: 2018-08-08 | End: 2018-08-09 | Stop reason: HOSPADM

## 2018-08-08 RX ORDER — FENTANYL CITRATE 50 UG/ML
25 INJECTION, SOLUTION INTRAMUSCULAR; INTRAVENOUS
Status: DISCONTINUED | OUTPATIENT
Start: 2018-08-08 | End: 2018-08-08

## 2018-08-08 RX ORDER — INSULIN LISPRO 100 [IU]/ML
14 INJECTION, SOLUTION INTRAVENOUS; SUBCUTANEOUS
Status: DISCONTINUED | OUTPATIENT
Start: 2018-08-08 | End: 2018-08-09 | Stop reason: HOSPADM

## 2018-08-08 RX ORDER — INSULIN GLARGINE 100 [IU]/ML
22 INJECTION, SOLUTION SUBCUTANEOUS DAILY
Status: DISCONTINUED | OUTPATIENT
Start: 2018-08-08 | End: 2018-08-08

## 2018-08-08 RX ORDER — SODIUM CHLORIDE 9 MG/ML
3 INJECTION, SOLUTION INTRAVENOUS AS NEEDED
Status: DISPENSED | OUTPATIENT
Start: 2018-08-08 | End: 2018-08-08

## 2018-08-08 RX ORDER — FOLIC ACID 1 MG/1
1 TABLET ORAL DAILY
Status: DISCONTINUED | OUTPATIENT
Start: 2018-08-08 | End: 2018-08-09 | Stop reason: HOSPADM

## 2018-08-08 RX ORDER — SODIUM CHLORIDE 9 MG/ML
75 INJECTION, SOLUTION INTRAVENOUS CONTINUOUS
Status: DISCONTINUED | OUTPATIENT
Start: 2018-08-08 | End: 2018-08-08

## 2018-08-08 RX ORDER — CLOPIDOGREL 300 MG/1
600 TABLET, FILM COATED ORAL AS NEEDED
Status: DISCONTINUED | OUTPATIENT
Start: 2018-08-08 | End: 2018-08-08

## 2018-08-08 RX ORDER — GABAPENTIN 600 MG/1
600 TABLET ORAL 3 TIMES DAILY
Status: DISCONTINUED | OUTPATIENT
Start: 2018-08-08 | End: 2018-08-09 | Stop reason: HOSPADM

## 2018-08-08 RX ORDER — SERTRALINE HYDROCHLORIDE 50 MG/1
100 TABLET, FILM COATED ORAL DAILY
Status: DISCONTINUED | OUTPATIENT
Start: 2018-08-08 | End: 2018-08-09 | Stop reason: HOSPADM

## 2018-08-08 RX ORDER — HYDROCODONE BITARTRATE AND ACETAMINOPHEN 10; 325 MG/1; MG/1
1 TABLET ORAL
Status: DISCONTINUED | OUTPATIENT
Start: 2018-08-08 | End: 2018-08-09 | Stop reason: HOSPADM

## 2018-08-08 RX ORDER — ROSUVASTATIN CALCIUM 40 MG/1
40 TABLET, COATED ORAL DAILY
Status: DISCONTINUED | OUTPATIENT
Start: 2018-08-08 | End: 2018-08-09 | Stop reason: HOSPADM

## 2018-08-08 RX ORDER — HEPARIN SODIUM 200 [USP'U]/100ML
1000 INJECTION, SOLUTION INTRAVENOUS AS NEEDED
Status: DISCONTINUED | OUTPATIENT
Start: 2018-08-08 | End: 2018-08-08

## 2018-08-08 RX ORDER — HEPARIN SODIUM 1000 [USP'U]/ML
1000-10000 INJECTION, SOLUTION INTRAVENOUS; SUBCUTANEOUS AS NEEDED
Status: DISCONTINUED | OUTPATIENT
Start: 2018-08-08 | End: 2018-08-08

## 2018-08-08 RX ORDER — NITROGLYCERIN 0.4 MG/1
0.4 TABLET SUBLINGUAL
Status: DISCONTINUED | OUTPATIENT
Start: 2018-08-08 | End: 2018-08-09 | Stop reason: HOSPADM

## 2018-08-08 RX ORDER — MAG HYDROX/ALUMINUM HYD/SIMETH 200-200-20
30 SUSPENSION, ORAL (FINAL DOSE FORM) ORAL
Status: DISCONTINUED | OUTPATIENT
Start: 2018-08-08 | End: 2018-08-09 | Stop reason: HOSPADM

## 2018-08-08 RX ORDER — MIDAZOLAM HYDROCHLORIDE 1 MG/ML
1-10 INJECTION, SOLUTION INTRAMUSCULAR; INTRAVENOUS
Status: DISCONTINUED | OUTPATIENT
Start: 2018-08-08 | End: 2018-08-08

## 2018-08-08 RX ORDER — SODIUM CHLORIDE 0.9 % (FLUSH) 0.9 %
5 SYRINGE (ML) INJECTION AS NEEDED
Status: DISCONTINUED | OUTPATIENT
Start: 2018-08-08 | End: 2018-08-08

## 2018-08-08 RX ORDER — CALCIUM CARBONATE 200(500)MG
400 TABLET,CHEWABLE ORAL DAILY
Status: DISCONTINUED | OUTPATIENT
Start: 2018-08-08 | End: 2018-08-09 | Stop reason: HOSPADM

## 2018-08-08 RX ORDER — FENTANYL CITRATE 50 UG/ML
25-200 INJECTION, SOLUTION INTRAMUSCULAR; INTRAVENOUS
Status: DISCONTINUED | OUTPATIENT
Start: 2018-08-08 | End: 2018-08-08

## 2018-08-08 RX ORDER — SODIUM CHLORIDE 9 MG/ML
1.5 INJECTION, SOLUTION INTRAVENOUS AS NEEDED
Status: DISPENSED | OUTPATIENT
Start: 2018-08-08 | End: 2018-08-08

## 2018-08-08 RX ORDER — LISINOPRIL 10 MG/1
10 TABLET ORAL DAILY
Status: DISCONTINUED | OUTPATIENT
Start: 2018-08-08 | End: 2018-08-09

## 2018-08-08 RX ORDER — CARVEDILOL 12.5 MG/1
25 TABLET ORAL 2 TIMES DAILY WITH MEALS
Status: DISCONTINUED | OUTPATIENT
Start: 2018-08-08 | End: 2018-08-09 | Stop reason: HOSPADM

## 2018-08-08 RX ADMIN — HYDROCODONE BITARTRATE AND ACETAMINOPHEN 1 TABLET: 10; 325 TABLET ORAL at 21:14

## 2018-08-08 RX ADMIN — FENTANYL CITRATE 25 MCG: 50 INJECTION, SOLUTION INTRAMUSCULAR; INTRAVENOUS at 09:35

## 2018-08-08 RX ADMIN — LIDOCAINE HYDROCHLORIDE 10 ML: 10 INJECTION, SOLUTION INFILTRATION; PERINEURAL at 07:59

## 2018-08-08 RX ADMIN — CALCIUM CARBONATE (ANTACID) CHEW TAB 500 MG 400 MG: 500 CHEW TAB at 10:48

## 2018-08-08 RX ADMIN — TICAGRELOR 90 MG: 90 TABLET ORAL at 20:06

## 2018-08-08 RX ADMIN — SODIUM CHLORIDE 1.5 ML/KG/HR: 900 INJECTION, SOLUTION INTRAVENOUS at 08:50

## 2018-08-08 RX ADMIN — IBUPROFEN 400 MG: 400 TABLET, FILM COATED ORAL at 14:15

## 2018-08-08 RX ADMIN — MIDAZOLAM HYDROCHLORIDE 1 MG: 1 INJECTION, SOLUTION INTRAMUSCULAR; INTRAVENOUS at 08:17

## 2018-08-08 RX ADMIN — Medication 10 ML: at 06:20

## 2018-08-08 RX ADMIN — FENTANYL CITRATE 25 MCG: 50 INJECTION, SOLUTION INTRAMUSCULAR; INTRAVENOUS at 08:17

## 2018-08-08 RX ADMIN — GABAPENTIN 600 MG: 600 TABLET, FILM COATED ORAL at 17:05

## 2018-08-08 RX ADMIN — FOLIC ACID 1 MG: 1 TABLET ORAL at 10:46

## 2018-08-08 RX ADMIN — TICAGRELOR 180 MG: 90 TABLET ORAL at 08:27

## 2018-08-08 RX ADMIN — ASPIRIN 81 MG 81 MG: 81 TABLET ORAL at 09:27

## 2018-08-08 RX ADMIN — MIDAZOLAM HYDROCHLORIDE 2 MG: 1 INJECTION, SOLUTION INTRAMUSCULAR; INTRAVENOUS at 07:57

## 2018-08-08 RX ADMIN — CARVEDILOL 25 MG: 12.5 TABLET, FILM COATED ORAL at 17:05

## 2018-08-08 RX ADMIN — SERTRALINE HYDROCHLORIDE 100 MG: 50 TABLET ORAL at 09:48

## 2018-08-08 RX ADMIN — GABAPENTIN 600 MG: 600 TABLET, FILM COATED ORAL at 09:48

## 2018-08-08 RX ADMIN — NITROGLYCERIN 200 MCG: 5 INJECTION, SOLUTION INTRAVENOUS at 08:25

## 2018-08-08 RX ADMIN — LISINOPRIL 10 MG: 5 TABLET ORAL at 09:15

## 2018-08-08 RX ADMIN — ROSUVASTATIN CALCIUM 40 MG: 40 TABLET, FILM COATED ORAL at 10:46

## 2018-08-08 RX ADMIN — IOPAMIDOL 138 ML: 755 INJECTION, SOLUTION INTRAVENOUS at 08:35

## 2018-08-08 RX ADMIN — BIVALIRUDIN 165.03 MG/HR: 250 INJECTION, POWDER, LYOPHILIZED, FOR SOLUTION INTRAVENOUS at 08:13

## 2018-08-08 RX ADMIN — SODIUM CHLORIDE 3 ML/KG/HR: 900 INJECTION, SOLUTION INTRAVENOUS at 07:58

## 2018-08-08 RX ADMIN — SODIUM CHLORIDE 75 ML/HR: 900 INJECTION, SOLUTION INTRAVENOUS at 04:59

## 2018-08-08 RX ADMIN — FENTANYL CITRATE 50 MCG: 50 INJECTION, SOLUTION INTRAMUSCULAR; INTRAVENOUS at 07:57

## 2018-08-08 RX ADMIN — NITROGLYCERIN 0.4 MG: 0.4 TABLET SUBLINGUAL at 09:20

## 2018-08-08 RX ADMIN — FENTANYL CITRATE 25 MCG: 50 INJECTION, SOLUTION INTRAMUSCULAR; INTRAVENOUS at 06:20

## 2018-08-08 RX ADMIN — FENTANYL CITRATE 25 MCG: 50 INJECTION, SOLUTION INTRAMUSCULAR; INTRAVENOUS at 08:03

## 2018-08-08 RX ADMIN — INSULIN LISPRO 14 UNITS: 100 INJECTION, SOLUTION INTRAVENOUS; SUBCUTANEOUS at 13:56

## 2018-08-08 RX ADMIN — INSULIN GLARGINE 22 UNITS: 100 INJECTION, SOLUTION SUBCUTANEOUS at 13:50

## 2018-08-08 RX ADMIN — FENTANYL CITRATE 25 MCG: 50 INJECTION, SOLUTION INTRAMUSCULAR; INTRAVENOUS at 08:24

## 2018-08-08 RX ADMIN — FENTANYL CITRATE 25 MCG: 50 INJECTION, SOLUTION INTRAMUSCULAR; INTRAVENOUS at 02:15

## 2018-08-08 RX ADMIN — NITROGLYCERIN 200 MCG: 5 INJECTION, SOLUTION INTRAVENOUS at 08:17

## 2018-08-08 RX ADMIN — GABAPENTIN 600 MG: 600 TABLET, FILM COATED ORAL at 21:14

## 2018-08-08 RX ADMIN — HYDROCODONE BITARTRATE AND ACETAMINOPHEN 1 TABLET: 10; 325 TABLET ORAL at 15:14

## 2018-08-08 RX ADMIN — MIDAZOLAM HYDROCHLORIDE 2 MG: 1 INJECTION, SOLUTION INTRAMUSCULAR; INTRAVENOUS at 08:01

## 2018-08-08 RX ADMIN — CARVEDILOL 25 MG: 12.5 TABLET, FILM COATED ORAL at 09:43

## 2018-08-08 RX ADMIN — Medication 10 ML: at 21:16

## 2018-08-08 RX ADMIN — IBUPROFEN 400 MG: 400 TABLET, FILM COATED ORAL at 20:06

## 2018-08-08 RX ADMIN — INSULIN LISPRO 14 UNITS: 100 INJECTION, SOLUTION INTRAVENOUS; SUBCUTANEOUS at 17:05

## 2018-08-08 RX ADMIN — Medication 1 CAPSULE: at 10:46

## 2018-08-08 RX ADMIN — IOPAMIDOL 50 ML: 755 INJECTION, SOLUTION INTRAVENOUS at 08:12

## 2018-08-08 RX ADMIN — MIDAZOLAM HYDROCHLORIDE 1 MG: 1 INJECTION, SOLUTION INTRAMUSCULAR; INTRAVENOUS at 08:24

## 2018-08-08 NOTE — PROCEDURES
Cardiac Catheterization Procedure Note   Patient: Ti Guzmán  MRN: [id number]  SSN: [id number]   YOB: 1970 Age: 53 y.o.   Sex: male    Date of Procedure: 8/8/2018   Pre-procedure Diagnosis: Unstable Angina  Post-procedure Diagnosis: Coronary Artery Disease  Procedure: PCI  :  Dr. Aline Nieto MD    Assistant(s):  None  Anesthesia: Moderate Sedation   Estimated Blood Loss: Less than 10 mL   Specimens Removed: None  Findings: LVG; EF 40% with inferobasal akinesis; LM is ok; LAD is occluded at proximal and LCX is also occluded at proximal; RCA is occluded at proximal but PDA is filled by collaterals from Lt system; SVG-LCX with proximal 75% and insertion 95% and OM1 proximal 90% lesions, all ISR lesion, treated with 3.5x22, 30.x22, and 2.5x18 [place] respectively with excellent results  Complications: None   Implants:  None  Signed by:  Aline Nieto MD  8/8/2018  8:31 AM

## 2018-08-08 NOTE — PROGRESS NOTES
TRANSFER - OUT REPORT:    Verbal report given to Genna Gaytan RN (name) on Xander Jolly  being transferred to CVSU room 460 (unit) for routine post - op       Report consisted of patients Situation, Background, Assessment and   Recommendations(SBAR). Information from the following report(s) SBAR was reviewed with the receiving nurse. Lines:   Peripheral IV 08/07/18 Left Hand (Active)   Site Assessment Clean, dry, & intact 8/8/2018  4:15 AM   Phlebitis Assessment 0 8/8/2018  4:15 AM   Infiltration Assessment 0 8/8/2018  4:15 AM   Dressing Status Clean, dry, & intact 8/8/2018  4:15 AM   Dressing Type Transparent 8/8/2018  4:15 AM   Hub Color/Line Status Capped;Flushed 8/8/2018  4:15 AM   Action Taken Open ports on tubing capped 8/8/2018  4:15 AM   Alcohol Cap Used Yes 8/8/2018  4:15 AM        Opportunity for questions and clarification was provided. Patient transported from Cath lab.

## 2018-08-08 NOTE — PROGRESS NOTES
Bedside shift change report given to Latia Brar RN  (oncoming nurse) by Tawny Dang RN  (offgoing nurse). Report included the following information SBAR and MAR.

## 2018-08-08 NOTE — CARDIO/PULMONARY
Cardiac Rehab: CAD education folder given to Char Johnson.      Educated using teach back method. Reviewed CAD diagnosis definition and purpose of intervention. Discussed risk factors for CAD to include the following: family history, elevated BMI, hyperlipidemia, hypertension, diabetes, stress, and smoking. Discussed Heart Healthy/Low Sodium (2000 mg) diet. Reviewed the importance of medication compliance, the purpose of BRILINTA, and potential side effects. Discussed follow up appointments with cardiologist, signs and symptoms of angina, and what to report to physician after discharge. Emphasized the value of cardiac rehab. Discussed Cardiac Rehab Program format, benefits, and encouraged enrollment to assist with risk modification and management. Char Johnson is a [place] client so the contact information for Anna Barrera is on his AVS.      Char Elizabeth verbalized understanding with questions answered.  Missy Elliott RN

## 2018-08-08 NOTE — PROGRESS NOTES
1500: TRANSFER - IN REPORT:    Verbal report received from Kimberly(name) on David Vicente  being received from cath lab(unit) for routine progression of care      Report consisted of patients Situation, Background, Assessment and   Recommendations(SBAR). Information from the following report(s) SBAR, Kardex, Procedure Summary, Intake/Output, MAR and Cardiac Rhythm NSR was reviewed with the receiving nurse. Opportunity for questions and clarification was provided. Assessment completed upon patients arrival to unit and care assumed. VSS. Cath site without complication. Problem: Falls - Risk of  Goal: *Absence of Falls  Document Jillian Fall Risk and appropriate interventions in the flowsheet. Outcome: Progressing Towards Goal  Fall Risk Interventions:            Medication Interventions: Patient to call before getting OOB, Teach patient to arise slowly                  Problem: Cath Lab Procedures: Post-Cath Day 1  Goal: Activity/Safety  Outcome: Progressing Towards Goal  Pt up ab juan r. Able to ambulate without assistance.   Goal: Nutrition/Diet  Outcome: Progressing Towards Goal  Pt tolerating regular cardiac diet

## 2018-08-08 NOTE — PROGRESS NOTES
Problem: Falls - Risk of  Goal: *Absence of Falls  Document Jillian Fall Risk and appropriate interventions in the flowsheet.   Outcome: Progressing Towards Goal  Fall Risk Interventions:            Medication Interventions: Teach patient to arise slowly, Patient to call before getting OOB

## 2018-08-08 NOTE — PROGRESS NOTES
Faculty or Preceptor Review of Student Work    8/8/2018  - Shift times - 1500 to 2000    The student documentation of patient care for Cesilia Eugene has been reviewed and approved. All medications have been administered under the direct supervision of the faculty or preceptor.     Annia Rouse RN

## 2018-08-08 NOTE — PHYSICIAN ADVISORY
Letter of Status Determination:   Recommend hospitalization status upgraded from   OBSERVATION  to INPATIENT  Status     Pt Name:  Iliana Persaud   MR#   72 [facility] # [id number] /  [id number]   [facility]#  [id number]   Room and Hospital  CCL/PL  @ [facility]   Hospitalization date  8/7/2018  9:18 PM   Current Attending Physician  Jordon Wild MD   Principal diagnosis  Acute chest pain      Clinicals  53 y.o. y.o  male hospitalized with above diagnosis   The pt went through cardiac cath and now s/p PCI Stents. [place] (MCG) criteria   Does  NOT apply    STATUS DETERMINATION  Based on documented presenting clinical data, comorbid conditions, high risk of adverse events and deterioration, it is our recommendation that the patient's status should be upgraded from OBSERVATION to INPATIENT status. The final decision of the patient's hospitalization status depends on the attending physician's judgment. Additional comments     Payor: Brooke Sheppard / Plan: Ervin Tejada / Product Type: Federal Funded Programs /         Barbra Perez MD MPH FACP     Physician Advisor    [address]   [facility] [employer], [address] [place] [address]    Cell  [phone]        [phone]    .

## 2018-08-08 NOTE — PROGRESS NOTES
2D echo completed  Tolerated food and drink  Given Lantus and novolog insulin/orders  Motrin for right groin pain

## 2018-08-08 NOTE — PROGRESS NOTES
TRANSFER - IN REPORT:    Verbal report received from [employer] on Gertrudis Landeros  being received from procedure for routine progression of care. Report consisted of patients Situation, Background, Assessment and Recommendations(SBAR). Information from the following report(s) Procedure Summary, MAR and Recent Results was reviewed with the receiving clinician. Opportunity for questions and clarification was provided. Assessment completed upon patients arrival to [address] [place] [address] and care assumed. Cardiac Cath Lab Recovery Arrival Note:    Gertrudis Landeros arrived to [facility] recovery area. Patient procedure= LHC/3 PARDEEP. Patient on cardiac monitor, non-invasive blood pressure, SPO2 monitor. On  O2 @ 2 lpm via n/c. IV  of nacl on pump at 142 ml/hr. Patient status doing well without problems. Patient is A&Ox 4, sleepy. Patient reports no complaints @ this point. PROCEDURE SITE CHECK:    Procedure site:without any bleeding and or hematoma, no pain/discomfort reported at procedure site. No change in patient status. Continue to monitor patient and status. Dr Oliver Serrano talked with pt.

## 2018-08-08 NOTE — ED TRIAGE NOTES
TRIAGE: Pt arrives via EMS from home for CP beginning 1 week ago. Pt has been controlling pain with Nitro, but pain has been uncontrolled today. Pt reports dizziness x1 week. Pt stated there is pain in both arms as well.   Pt received 324 ASA in route    Hx: MI 7 years ago with stents, DM

## 2018-08-08 NOTE — PROGRESS NOTES
TRANSFER - OUT REPORT:    Verbal report given to [facility] RN(name) on Marck Estrada  being transferred to [facility](unit) for routine progression of care       Report consisted of patients Situation, Background, Assessment and   Recommendations(SBAR). Information from the following report(s) SBAR, Procedure Summary, Intake/Output, MAR, Accordion, Recent Results, Med Rec Status and Cardiac Rhythm SR was reviewed with the receiving nurse. Lines:   Peripheral IV 08/07/18 Left Hand (Active)   Site Assessment Clean, dry, & intact 8/8/2018  4:15 AM   Phlebitis Assessment 0 8/8/2018  4:15 AM   Infiltration Assessment 0 8/8/2018  4:15 AM   Dressing Status Clean, dry, & intact 8/8/2018  4:15 AM   Dressing Type Transparent 8/8/2018  4:15 AM   Hub Color/Line Status Capped;Flushed 8/8/2018  4:15 AM   Action Taken Open ports on tubing capped 8/8/2018  4:15 AM   Alcohol Cap Used Yes 8/8/2018  4:15 AM        Opportunity for questions and clarification was provided.       Patient transported with:   Tech     1455 transferred via stretcher to room 460 with pt transport

## 2018-08-08 NOTE — PROGRESS NOTES
TRANSFER - OUT REPORT:    Verbal report given to [facility] (name) on Denis Escudero  being transferred to cath lab recovery room (unit) for routine progression of care       Report consisted of patients Situation, Background, Assessment and   Recommendations(SBAR). Information from the following report(s) SBAR, Procedure Summary and MAR was reviewed with the receiving nurse. Lines:   Peripheral IV 08/07/18 Left Hand (Active)   Site Assessment Clean, dry, & intact 8/8/2018  4:15 AM   Phlebitis Assessment 0 8/8/2018  4:15 AM   Infiltration Assessment 0 8/8/2018  4:15 AM   Dressing Status Clean, dry, & intact 8/8/2018  4:15 AM   Dressing Type Transparent 8/8/2018  4:15 AM   Hub Color/Line Status Capped;Flushed 8/8/2018  4:15 AM   Action Taken Open ports on tubing capped 8/8/2018  4:15 AM   Alcohol Cap Used Yes 8/8/2018  4:15 AM        Opportunity for questions and clarification was provided.       Patient transported with:   [employer]

## 2018-08-08 NOTE — PROGRESS NOTES
Pt c/o \"chest pain\"  12 lead EKG completed, Dr Lynnette Solitario notified, SL NTG given with some stated relief. 80 C/O right groin pain, Dr Lynnette Solitario aware,  Fentanyl IV given.     1030 pt sleeping

## 2018-08-08 NOTE — H&P
[address] [place]   HISTORY AND PHYSICAL      Kin King  MR#: [id number]  : 1970  ACCOUNT #: [de-identified]   ADMIT DATE: 2018    CHIEF COMPLAINT:  Chest pain. HISTORY OF PRESENT ILLNESS:  A 78-year-old white male with past medical history of coronary artery disease, ischemic cardiomyopathy, myocardial infarction, hypertension, hyperlipidemia, type 2 diabetes mellitus, endocarditis, presented to the emergency department from home via EMS with chief complaint of chest pain. Symptoms onset reportedly began approximately 3 days ago. He notes that pain is severe, diffuse, pointing to the substernal region of his chest into his neck and both arms, described as \"like a vice . \"  Currently rated 8-9/10 without specific exacerbating or relieving factors. He notes that he has had similar presentations with prior history of \"blocked arteries. \"  The patient complains of shortness of breath, which he describes as Jason Shashi somebody choking me. \"  He has a past history of having unstable angina, was last hospitalized for the same from 2018 and 2018. During that hospitalization on 2018 he underwent a left heart catheterization which showed a left ventricular ejection fraction to 45% to 50%. Patient had been followed at the [facility] for cardiology coverage; however, he notes that it has been at least over a year since the last time he was seen in consultation. He reportedly has some dizziness starting approximately a week ago and has felt dehydrated. He notes that he was unable to check his blood glucose levels due to a dead battery on his glucose monitor. He notes, however, he normally takes Lantus 22 units subcutaneously q.a.m. in addition to additional insulin coverage. 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   He does not complain of syncope, loss of consciousness, current headache, neck pain, back pain, abdominal pain, nausea, vomiting, diarrhea, melena, dysuria, hematuria, calf pain, swelling, edema, fever, chills or rash. Upon arrival to the emergency department, initial recorded vital signs, blood pressure 145/87, heart rate 95, respiratory rate 18, O2 saturation 95% on room air. Workup included labs showing troponin less than 0.05. A 12-lead EKG showed normal sinus rhythm, ST changes in inferior leads and ST and T-wave changes in the lateral leads and 95 beats per minute. Patient now seen for admission to the hospitalist service for continued evaluation and treatment. PAST MEDICAL HISTORY:  1. Coronary artery disease. 2.  Ischemic cardiomyopathy. 3.  Myocardial infarction. 4.  Type 2 diabetes mellitus. 5.  Endocarditis. 6.  Hypertension. 7.  Hyperlipidemia. 8.  Prior substance abuse, drug abuse. PAST SURGICAL HISTORY:  1. PTCA stent. 2.  Left heart catheterization 02/07/2018. 3.  Coronary bypass graft x4 vessels.     MEDICATIONS:  Complete medication list reviewed and noted in the chart records.               Taking Last Dose Start Date End Date Provider      albuterol (PROVENTIL HFA, VENTOLIN HFA, PROAIR HFA) 90 mcg/actuation inhaler    --  --  Historical Provider      Take 2 Puffs by inhalation every four (4) hours as needed for Wheezing.      alum-mag hydroxide-simeth (MYLANTA) 200-200-20 mg/5 mL susp    --  --  Historical Provider      Take 30 mL by mouth three (3) times daily as needed.      aspirin 81 mg chewable tablet    --  --  Luis Alfredo Victor MD      Take 81 mg by mouth daily.      calcium carbonate (TUMS) 200 mg calcium (500 mg) Chew    --  --  Historical Provider      Take 2 Tabs by mouth as needed.        carvedilol (COREG) 12.5 mg tablet    --  --  Historical Provider      Take 25 mg by mouth two (2) times daily (with meals).      folic acid (FOLVITE) 1 mg tablet    --  --  Luis Alfredo Victor MD      Take  by mouth daily.      gabapentin (NEURONTIN) 600 mg tablet    --  --  Historical Provider      Take 600 mg by mouth three (3) times daily. Indications: NEUROPATHIC PAIN      ibuprofen (MOTRIN) 400 mg tablet    10/11/14  -- Elena Berrios MD      Take 1 tablet by mouth every six (6) hours as needed for Pain.      insulin aspart (NOVOLOG FLEXPEN) 100 unit/mL inpn    --  --  Historical Provider      14 Units by SubCUTAneous route Before breakfast, lunch, and dinner. Indications: Gestational Diabetes Mellitus      insulin glargine (LANTUS) 100 unit/mL injection    --  --  Historical Provider      22 Units by SubCUTAneous route daily. Indications: takes in the am      lisinopril (PRINIVIL, ZESTRIL) 20 mg tablet    --  --  Historical Provider      Take 10 mg by mouth daily.      nitroglycerin (NITROSTAT) 0.4 mg SL tablet    --  --  Luis Alfredo Victor MD      by SubLINGual route every five (5) minutes as needed.        omega 3-dha-epa-fish oil (FISH OIL) 100-160-1,000 mg cap    --  --  Historical Provider      Take 1,000 mg by mouth daily.      rosuvastatin (CRESTOR) 40 mg tablet    --  --  Historical Provider      Take 40 mg by mouth daily.      sertraline (ZOLOFT) 100 mg tablet    --  --  Luis Alfredo Victor MD      Take 100 mg by mouth daily.      ticagrelor (BRILINTA) 90 mg tablet    --  --  Historical Provider      Take 90 mg by mouth two (2) times a day.      ticagrelor (BRILINTA) 90 mg tablet    02/08/18  --  Antony Purvis MD      Take 1 Tab by mouth every twelve (12) hours every twelve (12) hours.           ALLERGIES:  TRAMADOL. SOCIAL HISTORY:  Reportedly quit smoking cigarettes approximately 1 month ago. Positive occasional alcohol. Last use of marijuana approximately 2 weeks ago. Past history of cocaine and heroin IV drug abuse. FAMILY HISTORY:  Stroke in father. REVIEW OF SYSTEMS:  Pertinent positives as noted in HPI. All other systems were reviewed and negative.     PHYSICAL EXAMINATION:  VITAL SIGNS:  Temperature 98.6 degrees Fahrenheit, blood pressure 154/94, heart rate 95, respiration 14, saturation 94% on room air. Recorded height 5 feet 8 inches tall. GENERAL:  Overweight male in no acute respiratory distress. PSYCHIATRIC:  The patient is awake, alert, oriented x3, but very anxious. NEUROLOGIC:  GCS of 15. Moves extremities x4. Sensation grossly intact. No slurred speech, no facial droop. HEENT:  Normocephalic, atraumatic. PERRLA. EOMs intact. Sclerae anicteric. Conjunctivae clear. Nares are patent. Oropharynx:  Tongue is midline and nonedematous. NECK:  Supple, no lymphadenopathy, JVP, carotid bruits or thyromegaly. LYMPH:  Negative for cervical or supraclavicular adenopathy. CHEST:  Lungs clear to auscultation bilaterally. CARDIOVASCULAR:  Heart regular rate and rhythm, normal S1, S2, without murmurs, rubs or gallops. ABDOMEN:  Soft, nontender, nondistended. Bowel sounds are equal.  No rebound, guarding, rigidity. No auscultated abdominal bruit or pulsatile mass. BACK:  No CVA tenderness. No step-off deformity. MUSCULOSKELETAL:  No acute back deformity. Negative for calf tenderness. VASCULAR:  2+ radial, 1+ dorsalis pedis pulses. Without cyanosis. Positive clubbing. Trace bilateral pedal edema. SKIN:  Warm and dry. LABORATORY DATA:  Reviewed. Sodium 136, potassium 4.4, chloride 105, CO2 18, BUN 23, creatinine 1.77, glucose 323, anion gap 13, calcium 7.0, GFR 41. Total bilirubin 0.4, total protein 6.2, albumin 3.0, ALT 49, AST of 34, alkaline phosphatase is 105. Troponin I of less than 0.35. WBC 8.2, hemoglobin 12.9, hematocrit 36.1, platelets 840, neutrophils of 57%. Chest x-ray, portable, no acute disease. A 12-lead EKG results noted in HPI. IMPRESSION AND PLAN:  1. Acute chest pain with unstable angina. Admit the patient to telemetry. Order repeat serial troponin levels. Placed on oxygen. Continue aspirin therapy. Have nitroglycerin p.r.n.   Patient was given beta blocker, metoprolol. Consult with cardiologist.  The patient was given fentanyl for pain. 2.  Shortness of breath. Place on oxygen therapy. Follow with telemetry monitoring. 3.  Type 2 diabetes mellitus, hyperglycemia, uncontrolled. Place on Humalog insulin correction coverage, scheduled Accu-Cheks. Check hemoglobin A1c level in a.m. Resume back on Lantus with diabetic diet. 4.  Dehydration. Order IV fluid hydration resuscitation. Repeat the renal panel in the a.m. with strict I's and O's, daily weights. 5.  Elevated serum carbon dioxide. Check lactic acid level to evaluate the acidosis. 6.  Coronary artery disease. Continue workup and plan as noted above. 7.  Hypertension. Resume back on home medications. Monitor blood pressure closely. 8.  Marijuana abuse. Encouraged smoking cessation. 9.  Ischemic cardiomyopathy. Workup as noted. 10.  Hyperlipidemia. Check lipid panel. Continue statin therapy. 11.  Venous thromboembolism prophylaxis. SCDs to lower extremities. MD TASNEEM Nieves/MOHINI  D: 08/08/2018 00:42     T: 08/08/2018 01:22  JOB #: [id number]

## 2018-08-08 NOTE — CONSULTS
Cardiology Consult Note    CC: CP  Reason for consult:  CP  Requesting MD:  Dr. Mcdermott Justus:      Date of  Admission: 8/7/2018  9:18 PM     Admission type:Emergency    Greg Gilliland is a 53 y.o. male admitted for Acute chest pain  Unstable angina ([facility]  Dehydration  Type 2 diabetes mellitus with hyperglycemia ([facility] Patient complains of SSCP with SOB. This is same pain as in 2/18 when he received three stents in LCX system. He started having bad CP starting last night and took 10 sl NTG but still would come back. His past cardiac data include CAD, s/p MI. 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 His troponin is elevated and ekg shows new lateral ischemia.       Patient Active Problem List    Diagnosis Date Noted    Dehydration 08/08/2018    Acute chest pain 08/08/2018    Type 2 diabetes mellitus with hyperglycemia ([facility] 08/08/2018    NSTEMI (non-ST elevated myocardial infarction) ([facility] 03/18/2017    Acute non Q wave MI (myocardial infarction), initial episode of care [facility]) 02/23/2012    Chest pain at rest 02/22/2012    CAD (coronary artery disease) 02/22/2012    HTN (hypertension) 02/22/2012    Hyperlipidemia 02/22/2012    Unstable angina ([facility] 02/22/2012      None  Past Medical History:   Diagnosis Date    CAD (coronary artery disease)     Diabetes ([facility]     \"prediabetes\" Was suppos to go clinic at  [facility] Endocarditis     Hypertension     MI (myocardial infarction) ([facility]       Past Surgical History:   Procedure Laterality Date    CARDIAC SURG PROCEDURE UNLIST      cath with stent placement feb 2012    CARDIAC SURG PROCEDURE UNLIST      CABGx4     Allergies   Allergen Reactions    Tramadol Itching     Pt reports he is not allergic to this      Family History   Problem Relation Age of Onset    Stroke Father       Current Facility-Administered Medications   Medication Dose Route Frequency    carvedilol (COREG) tablet 25 mg  25 mg Oral BID WITH MEALS    gabapentin (NEURONTIN) tablet 600 mg  600 mg Oral TID    folic acid (FOLVITE) tablet 1 mg  1 mg Oral DAILY    insulin glargine (LANTUS) injection 22 Units  22 Units SubCUTAneous DAILY    fish oil-omega-3 fatty acids 340-1,000 mg capsule 1 Cap  1 Cap Oral DAILY    rosuvastatin (CRESTOR) tablet 40 mg  40 mg Oral DAILY    sertraline (ZOLOFT) tablet 100 mg  100 mg Oral DAILY    ticagrelor (BRILINTA) tablet 90 mg  90 mg Oral BID    aspirin chewable tablet 81 mg  81 mg Oral DAILY    sodium chloride (NS) flush 5-10 mL  5-10 mL IntraVENous Q8H    sodium chloride (NS) flush 5-10 mL  5-10 mL IntraVENous PRN    0.9% sodium chloride infusion  75 mL/hr IntraVENous CONTINUOUS    fentaNYL citrate (PF) injection 25 mcg  25 mcg IntraVENous Q4H PRN    nitroglycerin (NITROSTAT) tablet 0.4 mg  0.4 mg SubLINGual Q5MIN PRN    nitroglycerin (NITROSTAT) tablet 0.4 mg  0.4 mg SubLINGual Q5MIN PRN        Prior to Admission Medications:  Prior to Admission medications    Medication Sig Start Date End Date Taking? Authorizing Provider   folic acid (FOLVITE) 1 mg tablet Take  by mouth daily. Yes Phys Other, MD   sertraline (ZOLOFT) 100 mg tablet Take 100 mg by mouth daily. Yes Phys Other, MD   ticagrelor (BRILINTA) 90 mg tablet Take 90 mg by mouth two (2) times a day. Yes Historical Provider   rosuvastatin (CRESTOR) 40 mg tablet Take 40 mg by mouth daily. Yes Historical Provider   albuterol (PROVENTIL HFA, VENTOLIN HFA, PROAIR HFA) 90 mcg/actuation inhaler Take 2 Puffs by inhalation every four (4) hours as needed for Wheezing. Yes Historical Provider   alum-mag hydroxide-simeth (MYLANTA) 200-200-20 mg/5 mL susp Take 30 mL by mouth three (3) times daily as needed. Yes Historical Provider   lisinopril (PRINIVIL, ZESTRIL) 20 mg tablet Take 10 mg by mouth daily. Yes Historical Provider   carvedilol (COREG) 12.5 mg tablet Take 25 mg by mouth two (2) times daily (with meals).    Yes Historical Provider   gabapentin (NEURONTIN) 600 mg tablet Take 600 mg by mouth three (3) times daily. Indications: NEUROPATHIC PAIN   Yes Historical Provider   insulin glargine (LANTUS) 100 unit/mL injection 22 Units by SubCUTAneous route daily. Indications: takes in the am   Yes Historical Provider   insulin aspart (NOVOLOG FLEXPEN) 100 unit/mL inpn 14 Units by SubCUTAneous route Before breakfast, lunch, and dinner. Indications: Gestational Diabetes Mellitus   Yes Historical Provider   aspirin 81 mg chewable tablet Take 81 mg by mouth daily. Yes Phys Other, MD   nitroglycerin (NITROSTAT) 0.4 mg SL tablet by SubLINGual route every five (5) minutes as needed. Yes Phys Other, MD   ticagrelor (BRILINTA) 90 mg tablet Take 1 Tab by mouth every twelve (12) hours every twelve (12) hours. 18   Dilip Parra MD   omega 0-amz-vlj-fish oil (FISH OIL) 100-160-1,000 mg cap Take 1,000 mg by mouth daily. Historical Provider   ibuprofen (MOTRIN) 400 mg tablet Take 1 tablet by mouth every six (6) hours as needed for Pain. 10/11/14   Radha Obando MD   calcium carbonate (TUMS) 200 mg calcium (500 mg) Chew Take 2 Tabs by mouth as needed. Historical Provider        Review of Symptoms:  Except as noted in HPI, patient denies recent fever or chills, nausea, vomiting, diarrhea, hemoptysis, hematemesis, dysuria, myalgias, focal neurologic symptoms, ecchymosis, angioedema, odynophagia, dysphagia, sore throat, earache,rash, melena, hematochezia, depression, GERD, cold intolerance, petechia, bleeding gums, or significant weight loss. A comprehensive review of systems was negative except for that written in the HPI.      Subjective:    24 hr VS reviewed, overall VSSAF  Temp (24hrs), Av.3 °F (36.8 °C), Min:98 °F (36.7 °C), Max:98.6 °F (37 °C)    Patient Vitals for the past 8 hrs:   Pulse   18 0351 88   18 0330 91   18 0300 87   18 0230 93   18 0200 95   18 0130 90   18 0100 95   18 0030 97   08/08/18 0000 97    Patient Vitals for the past 8 hrs:   Resp   08/08/18 0351 19   08/08/18 0330 21   08/08/18 0300 23   08/08/18 0230 13   08/08/18 0200 15   08/08/18 0130 14   08/08/18 0100 21   08/08/18 0030 14   08/08/18 0000 11    Patient Vitals for the past 8 hrs:   BP   08/08/18 0351 148/86   08/08/18 0330 (!) 145/95   08/08/18 0300 146/87   08/08/18 0230 (!) 177/107   08/08/18 0200 159/90   08/08/18 0130 148/86   08/08/18 0100 (!) 149/105   08/08/18 0030 154/89   08/08/18 0000 (!) 154/94          Intake/Output Summary (Last 24 hours) at 08/08/18 0732  Last data filed at 08/08/18 0505   Gross per 24 hour   Intake                0 ml   Output              725 ml   Net             -725 ml         Physical Exam (complete single organ system exam)    Visit Vitals    /86 (BP 1 Location: Right arm, BP Patient Position: At rest)    Pulse 88    Temp 98 °F (36.7 °C)    Resp 19    Ht 5' 8\" (1.727 m)    Wt 94.3 kg (207 lb 14.3 oz)    SpO2 99%    BMI 31.61 kg/m2     General Appearance:  Well developed, well nourished,alert and oriented x 3, and individual in no acute distress. Ears/Nose/Mouth/Throat:   Hearing grossly normal.         Neck: Supple. Chest:   Lungs clear to auscultation bilaterally. Cardiovascular:  Regular rate and rhythm, S1, S2 normal, no murmur. Abdomen:   Soft, non-tender, bowel sounds are active. Extremities: No edema bilaterally. Skin: Warm and dry.                Cardiographics    Telemetry: normal sinus rhythm  ECG: normal EKG, normal sinus rhythm, unchanged from previous tracings  Echocardiogram: Not done    Labs:   Recent Results (from the past 24 hour(s))   EKG, 12 LEAD, INITIAL    Collection Time: 08/07/18  9:33 PM   Result Value Ref Range    Ventricular Rate 95 BPM    Atrial Rate 95 BPM    P-R Interval 172 ms    QRS Duration 100 ms    Q-T Interval 372 ms    QTC Calculation (Bezet) 467 ms    Calculated P Axis 54 degrees    Calculated R Axis 72 degrees Calculated T Axis 170 degrees    Diagnosis       Normal sinus rhythm  Inferior infarct (cited on or before 18-MAR-2017)  ST & T wave abnormality, consider lateral ischemia  When compared with ECG of 06-FEB-2018 11:17,  No significant change was found     CBC WITH AUTOMATED DIFF    Collection Time: 08/07/18  9:41 PM   Result Value Ref Range    WBC 8.2 4.1 - 11.1 K/uL    RBC 3.74 (L) 4.10 - 5.70 M/uL    HGB 12.9 12.1 - 17.0 g/dL    HCT 36.1 (L) 36.6 - 50.3 %    MCV 96.5 80.0 - 99.0 FL    MCH 34.5 (H) 26.0 - 34.0 PG    MCHC 35.7 30.0 - 36.5 g/dL    RDW 13.4 11.5 - 14.5 %    PLATELET 650 267 - 213 K/uL    MPV 10.2 8.9 - 12.9 FL    NRBC 0.0 0  WBC    ABSOLUTE NRBC 0.00 0.00 - 0.01 K/uL    NEUTROPHILS 57 32 - 75 %    LYMPHOCYTES 32 12 - 49 %    MONOCYTES 6 5 - 13 %    EOSINOPHILS 3 0 - 7 %    BASOPHILS 1 0 - 1 %    IMMATURE GRANULOCYTES 1 (H) 0.0 - 0.5 %    ABS. NEUTROPHILS 4.7 1.8 - 8.0 K/UL    ABS. LYMPHOCYTES 2.6 0.8 - 3.5 K/UL    ABS. MONOCYTES 0.5 0.0 - 1.0 K/UL    ABS. EOSINOPHILS 0.3 0.0 - 0.4 K/UL    ABS. BASOPHILS 0.1 0.0 - 0.1 K/UL    ABS. IMM. GRANS. 0.0 0.00 - 0.04 K/UL    DF AUTOMATED     METABOLIC PANEL, COMPREHENSIVE    Collection Time: 08/07/18  9:41 PM   Result Value Ref Range    Sodium 136 136 - 145 mmol/L    Potassium 4.4 3.5 - 5.1 mmol/L    Chloride 105 97 - 108 mmol/L    CO2 18 (L) 21 - 32 mmol/L    Anion gap 13 5 - 15 mmol/L    Glucose 323 (H) 65 - 100 mg/dL    BUN 23 (H) 6 - 20 MG/DL    Creatinine 1.77 (H) 0.70 - 1.30 MG/DL    BUN/Creatinine ratio 13 12 - 20      GFR est AA 50 (L) >60 ml/min/1.73m2    GFR est non-AA 41 (L) >60 ml/min/1.73m2    Calcium 7.0 (L) 8.5 - 10.1 MG/DL    Bilirubin, total 0.4 0.2 - 1.0 MG/DL    ALT (SGPT) 49 12 - 78 U/L    AST (SGOT) 34 15 - 37 U/L    Alk.  phosphatase 105 45 - 117 U/L    Protein, total 6.2 (L) 6.4 - 8.2 g/dL    Albumin 3.0 (L) 3.5 - 5.0 g/dL    Globulin 3.2 2.0 - 4.0 g/dL    A-G Ratio 0.9 (L) 1.1 - 2.2     TROPONIN I    Collection Time: 08/07/18  9:41 PM   Result Value Ref Range    Troponin-I, Qt. <0.05 <0.05 ng/mL   POC TROPONIN-I    Collection Time: 08/07/18  9:43 PM   Result Value Ref Range    Troponin-I (POC) <0.04 0.00 - 0.08 ng/mL   GLUCOSE, POC    Collection Time: 08/08/18  1:04 AM   Result Value Ref Range    Glucose (POC) 308 (H) 65 - 100 mg/dL    Performed by [facility]    URINALYSIS W/ REFLEX CULTURE    Collection Time: 08/08/18  1:13 AM   Result Value Ref Range    Color YELLOW/STRAW      Appearance CLEAR CLEAR      Specific gravity 1.023 1.003 - 1.030      pH (UA) 6.0 5.0 - 8.0      Protein 100 (A) NEG mg/dL    Glucose >1000 (A) NEG mg/dL    Ketone NEGATIVE  NEG mg/dL    Bilirubin NEGATIVE  NEG      Blood NEGATIVE  NEG      Urobilinogen 0.2 0.2 - 1.0 EU/dL    Nitrites NEGATIVE  NEG      Leukocyte Esterase NEGATIVE  NEG      WBC 0-4 0 - 4 /hpf    RBC 0-5 0 - 5 /hpf    Epithelial cells FEW FEW /lpf    Bacteria NEGATIVE  NEG /hpf    UA:UC IF INDICATED CULTURE NOT INDICATED BY UA RESULT CNI      Hyaline cast 0-2 0 - 5 /lpf   TROPONIN I    Collection Time: 08/08/18  1:13 AM   Result Value Ref Range    Troponin-I, Qt. <0.05 <0.05 ng/mL   LACTIC ACID    Collection Time: 08/08/18  1:13 AM   Result Value Ref Range    Lactic acid <0.3 (L) 0.4 - 2.0 MMOL/L   CBC WITH AUTOMATED DIFF    Collection Time: 08/08/18  5:04 AM   Result Value Ref Range    WBC 8.3 4.1 - 11.1 K/uL    RBC 3.69 (L) 4.10 - 5.70 M/uL    HGB 12.3 12.1 - 17.0 g/dL    HCT 36.0 (L) 36.6 - 50.3 %    MCV 97.6 80.0 - 99.0 FL    MCH 33.3 26.0 - 34.0 PG    MCHC 34.2 30.0 - 36.5 g/dL    RDW 13.3 11.5 - 14.5 %    PLATELET 295 988 - 810 K/uL    MPV 10.3 8.9 - 12.9 FL    NRBC 0.0 0  WBC    ABSOLUTE NRBC 0.00 0.00 - 0.01 K/uL    NEUTROPHILS 49 32 - 75 %    LYMPHOCYTES 40 12 - 49 %    MONOCYTES 6 5 - 13 %    EOSINOPHILS 4 0 - 7 %    BASOPHILS 1 0 - 1 %    IMMATURE GRANULOCYTES 1 (H) 0.0 - 0.5 %    ABS. NEUTROPHILS 4.0 1.8 - 8.0 K/UL    ABS. LYMPHOCYTES 3.3 0.8 - 3.5 K/UL    ABS.  MONOCYTES 0.5 0.0 - 1.0 K/UL    ABS. EOSINOPHILS 0.4 0.0 - 0.4 K/UL    ABS. BASOPHILS 0.1 0.0 - 0.1 K/UL    ABS. IMM. GRANS. 0.0 0.00 - 0.04 K/UL    DF AUTOMATED     METABOLIC PANEL, BASIC    Collection Time: 08/08/18  5:04 AM   Result Value Ref Range    Sodium 134 (L) 136 - 145 mmol/L    Potassium 4.3 3.5 - 5.1 mmol/L    Chloride 105 97 - 108 mmol/L    CO2 20 (L) 21 - 32 mmol/L    Anion gap 9 5 - 15 mmol/L    Glucose 279 (H) 65 - 100 mg/dL    BUN 24 (H) 6 - 20 MG/DL    Creatinine 1.47 (H) 0.70 - 1.30 MG/DL    BUN/Creatinine ratio 16 12 - 20      GFR est AA >60 >60 ml/min/1.73m2    GFR est non-AA 51 (L) >60 ml/min/1.73m2    Calcium 8.1 (L) 8.5 - 10.1 MG/DL   MAGNESIUM    Collection Time: 08/08/18  5:04 AM   Result Value Ref Range    Magnesium 2.1 1.6 - 2.4 mg/dL   TROPONIN I    Collection Time: 08/08/18  5:04 AM   Result Value Ref Range    Troponin-I, Qt. 0.30 (H) <0.05 ng/mL   CK W/ REFLX CKMB    Collection Time: 08/08/18  5:04 AM   Result Value Ref Range     39 - 308 U/L   CK    Collection Time: 08/08/18  5:04 AM   Result Value Ref Range     39 - 308 U/L   ECG RHYTHM ANALYSIS ADULT    Collection Time: 08/08/18  5:23 AM   Result Value Ref Range    Ventricular Rate 87 BPM    Atrial Rate 87 BPM    P-R Interval 176 ms    QRS Duration 102 ms    Q-T Interval 356 ms    QTC Calculation (Bezet) 428 ms    Calculated P Axis 59 degrees    Calculated R Axis 73 degrees    Calculated T Axis -162 degrees    Diagnosis       Normal sinus rhythm  Incomplete right bundle branch block  Inferior infarct (cited on or before 18-MAR-2017)  ST & T wave abnormality, consider lateral ischemia  When compared with ECG of 07-AUG-2018 21:33,  No significant change was found          Assessment:     Assessment:   [place] with abnormal ekg and enzymes  CAD  S/p remote CABG  S/p stents in LCX; suspect restenosis  H/o endocarditis; no recent issues     Plan:    To cath lab    Celena Dhillon MD

## 2018-08-08 NOTE — ROUTINE PROCESS
Primary Nurse Taiwo Shultz and VIVEK Tovar performed a dual skin assessment on this patient No impairment noted  Marquis score is 23

## 2018-08-08 NOTE — ED PROVIDER NOTES
HPI Comments: 53 y.o. male with past medical history significant for HTN, MI, CAD, Endocarditis, and Diabetes who presents from EMS with chief complaint of Chest Pain. Patient states that he started experiencing intermittent \"dizzy spells\" about one week ago which he initially attributed to dehydration or his insulin. He states that he \"adjusted\" his insulin with no relief. He then began experiencing chest pain 2 days ago that feels like \"someone is sitting on his chest\" with associated arm pain. He notes that he has taken nitro over the past 2 days, which would provide temporary relief for 6 to 7 hours. This morning, however, his chest pain became more severe, and he has taken 10 Nitro with no relief. He notes that his pain radiates from his chest to his arms and back. He states that he felt some relief with the Nitro and Baby Asprin provided by EMS but this has since [employer] off. \"  Per EMS, he states that his symptoms today are similar to those when he previously had a heart attack. He has had no recent trauma and no fever but has had an increase in exertion, activity, and lifting since taking care of his sick girlfriend at home. He notes that his past heart attack was 6 to 7 years ago and that he has had 3 or 4 stents placed since that time. Patient previously visited the ED on 2/6/18 for Chest Pain and 3/18/17 for Unstable Angina and NSTEMI. He previously saw Dr. Celsa Soto at the [place], but this physician has since left. Pt states that he has been compliant with all of his daily medications. He denies fever, headache, chills, and diarrhea. There are no other acute medical concerns at this time. Social hx: Former Smoker (Quit 2012); passive smoke exposure; Occasional Alcohol Usage; Former Drug Usage (Quit Marijuana 2 weeks ago; previous usage of cocaine, heroin IV)    Note written by Armida Hung, as dictated by Marcello Rashid MD 9:19 PM    The history is provided by the patient.  No language  was used. Past Medical History:   Diagnosis Date    CAD (coronary artery disease)     Diabetes ([address]     \"prediabetes\" Was suppos to go clinic at  [facility] Endocarditis     Hypertension     MI (myocardial infarction) ([facility]        Past Surgical History:   Procedure Laterality Date    CARDIAC SURG PROCEDURE UNLIST      cath with stent placement feb 2012    CARDIAC SURG PROCEDURE UNLIST      CABGx4         Family History:   Problem Relation Age of Onset    Stroke Father        Social History     Social History    Marital status: SINGLE     Spouse name: N/A    Number of children: N/A    Years of education: N/A     Occupational History    Not on file. Social History Main Topics    Smoking status: Former Smoker     Quit date: 1/19/2012    Smokeless tobacco: Never Used    Alcohol use Yes      Comment: occassionl    Drug use: No      Comment: quit maijuana about 2 weeks ago/ prev use cocaine, heroin IV    Sexual activity: Not Currently     Partners: Female     Other Topics Concern    Not on file     Social History Narrative         ALLERGIES: Tramadol    Review of Systems   Constitutional: Negative for appetite change, chills and fever. HENT: Negative for rhinorrhea, sore throat and trouble swallowing. Eyes: Negative for photophobia. Respiratory: Negative for cough and shortness of breath. Cardiovascular: Positive for chest pain. Negative for palpitations. Gastrointestinal: Negative for abdominal pain, nausea and vomiting. Genitourinary: Negative for dysuria, frequency and hematuria. Musculoskeletal: Negative for arthralgias. Neurological: Positive for dizziness. Negative for syncope and weakness. Psychiatric/Behavioral: Negative for behavioral problems. The patient is not nervous/anxious.         Vitals:    08/07/18 2125 08/07/18 2130 08/07/18 2148 08/07/18 2150   BP: 145/87 145/87 130/63 130/63   Pulse: 95 97 93 94   Resp: 18 9  9   Temp: 98.6 °F (37 °C)      SpO2: 95% 96%  92%            Physical Exam   Constitutional: He appears well-developed and well-nourished. No distress. Appears to have significant pain with no distress   HENT:   Head: Normocephalic and atraumatic. Mouth/Throat: Oropharynx is clear and moist.   Eyes: EOM are normal. Pupils are equal, round, and reactive to light. Neck: Normal range of motion. Neck supple. Cardiovascular: Normal rate, regular rhythm, normal heart sounds and intact distal pulses. Exam reveals no gallop and no friction rub. No murmur heard. Pulmonary/Chest: Effort normal. No respiratory distress. He has no wheezes. He has no rales. Sternotomy scar   Abdominal: Soft. There is no tenderness. There is no rebound. Musculoskeletal: Normal range of motion. He exhibits no tenderness. Neurological: He is alert. No cranial nerve deficit. Motor; symmetric   Skin: No erythema. Psychiatric: His behavior is normal. His mood appears anxious. Nursing note and vitals reviewed. Note written by Armida Hylton, as dictated by Thi Xiao MD 9:19 PM    [facility]      ED Course       Procedures         ED EKG interpretation:  Rhythm: normal sinus rhythm; and regular . Rate (approx.): 95; Axis: normal; P wave: normal; QRS interval: normal ; ST/T wave: T wave inverted; in  Lead: I , II , III , aVL , aVF , V4 , V5  and V6 ; Other findings: 2,3,aVF q's. This EKG was interpreted by Thi Xiao MD,ED Provider. 10:01 PM      CONSULT NOTE:  Spoke to Dr Christopher Tobar concerning the patient. The patient's history, presentation, physical findings, and results were all discussed.    11:49 PM

## 2018-08-08 NOTE — ROUTINE PROCESS
TRANSFER - OUT REPORT:    Verbal report given to 606 Adamaris Khan RN(name) on [place]  being transferred to [facility](unit) for routine progression of care       Report consisted of patients Situation, Background, Assessment and   Recommendations(SBAR). Information from the following report(s) SBAR, Kardex, ED Summary and Recent Results was reviewed with the receiving nurse. Lines:   Peripheral IV 08/07/18 Left Hand (Active)   Site Assessment Clean, dry, & intact 8/7/2018  9:58 PM   Phlebitis Assessment 0 8/7/2018  9:58 PM   Infiltration Assessment 0 8/7/2018  9:58 PM   Dressing Status Clean, dry, & intact 8/7/2018  9:58 PM   Dressing Type Transparent 8/7/2018  9:58 PM   Hub Color/Line Status Patent; Flushed 8/7/2018  9:58 PM   Action Taken Blood drawn 8/7/2018  9:58 PM   Alcohol Cap Used Yes 8/7/2018  9:58 PM        Opportunity for questions and clarification was provided.       Patient transported with:   Registered Nurse

## 2018-08-08 NOTE — PROGRESS NOTES
[id number] -   Cardiac Cath Lab Procedure Area Arrival Note:    Maegan Crow arrived to Cardiac Cath Lab, Procedure Area. Patient identifiers verified with NAME and DATE OF BIRTH. Procedure verified with patient. Consent forms verified. Allergies verified. Patient informed of procedure and plan of care. Questions answered with review. Patient voiced understanding of procedure and plan of care. Patient on cardiac monitor, non-invasive blood pressure, SPO2 monitor. Placed on O2 @ 2 lpm via NC.  IV of NS on pump at 283 ml/hr. Patient status doing well with some problems : chest pain. Patient is A&Ox 4. Patient reports 8/10 chest pain radiating to arms and neck. Patient medicated during procedure with orders obtained and verified by Dr. Sandro Mcgarry. Refer to patients Cardiac Cath Lab PROCEDURE REPORT for vital signs, assessment, status, and response during procedure, printed at end of case. Printed report on chart or scanned into chart.

## 2018-08-08 NOTE — PROGRESS NOTES
Hospitalist Progress Note  Dara Hills MD  Answering service: [phone] OR [phone] from in house phone        Date of Service:  2018  NAME:  Denis Escudero  :  1970  MRN:  [id number]      Admission Summary:   A 42-year-old white male with past medical history of coronary artery disease, ischemic cardiomyopathy, myocardial infarction, hypertension, hyperlipidemia, type 2 diabetes mellitus, endocarditis, presented to the emergency department from home via EMS with chief complaint of chest pain. Symptoms onset reportedly began approximately 3 days ago. He notes that pain is severe, diffuse, pointing to the substernal region of his chest into his neck and both arms, described as \"like a vice . \"  Currently rated 8-9/10 without specific exacerbating or relieving factors. He notes that he has had similar presentations with prior history of \"blocked arteries. \"  The patient complains of shortness of breath, which he describes as Lavanda Breslow somebody choking me. \"  He has a past history of having unstable angina, was last hospitalized for the same from 2018 and 2018. During that hospitalization on 2018 he underwent a left heart catheterization which showed a left ventricular ejection fraction to 45% to 50%. Patient had been followed at the [facility] for cardiology coverage; however, he notes that it has been at least over a year since the last time he was seen in consultation. He reportedly has some dizziness starting approximately a week ago and has felt dehydrated. Interval history / Subjective:     He had cardiac cath today with PCI/stent placement     Assessment & Plan:     Unstable angina/ACS  S/p cath and PCI today.  Cath report is as follows  LVG; EF 40% with inferobasal akinesis; LM is ok; LAD is occluded at proximal and LCX is also occluded at proximal; RCA is occluded at proximal but PDA is filled by collaterals from Lt system; SVG-LCX with proximal 75% and insertion 95% and OM1 proximal 90% lesions, all ISR lesion, treated with 3.5x22, 30.x22, and 2.5x18 [place] respectively with excellent results     Continue  ASA, brilinita. Type 2 diabetes mellitus, hyperglycemia, uncontrolled. Continue home dose of lantus and humalog. Follow HbA1c.     Dehydration. Received IV fluid hydration resuscitation. Repeat the renal panel in the a.m. with strict I's and O's, daily weights. Elevated creatinine : CKD vs pre-renal azotemia. Received IVF. Repeat BMP in am.     Hypertension. Continue home medications    HLD    Code status: Full  DVT prophylaxis: [address] discussed with: Nurse  Disposition: Home w/Family and TBD     Hospital Problems  Date Reviewed: 8/8/2018          Codes Class Noted POA    Dehydration ICD-10-CM: E86.0  ICD-9-CM: 276.51  8/8/2018 Unknown        * (Principal)Acute chest pain ICD-10-CM: R07.9  ICD-9-CM: 786.50  8/8/2018 Unknown        Type 2 diabetes mellitus with hyperglycemia ([facility] ICD-10-CM: E11.65  ICD-9-CM: 250.00  8/8/2018 Unknown        Unstable angina ([facility] ICD-10-CM: I20.0  ICD-9-CM: 411.1  2/22/2012 Unknown                Review of Systems:   Not required       Vital Signs:    Last 24hrs VS reviewed since prior progress note. Most recent are:  Visit Vitals    BP (!) 163/94 (BP 1 Location: Right arm, BP Patient Position: At rest)    Pulse 91    Temp 98 °F (36.7 °C)    Resp 20    Ht 5' 8\" (1.727 m)    Wt 94.3 kg (207 lb 14.3 oz)    SpO2 91%    BMI 31.61 kg/m2         Intake/Output Summary (Last 24 hours) at 08/08/18 0909  Last data filed at 08/08/18 0505   Gross per 24 hour   Intake                0 ml   Output              725 ml   Net             -725 ml        Physical Examination:             Constitutional:  No acute distress, cooperative, pleasant    ENT:  Oral mucous moist, oropharynx benign. Neck supple,    Resp:  CTA bilaterally. No wheezing/rhonchi/rales.  No accessory muscle use   CV:  Regular rhythm, normal rate, no murmurs, gallops, rubs    GI:  Soft, non distended, non tender. normoactive bowel sounds, no hepatosplenomegaly     Musculoskeletal:  No edema, warm, 2+ pulses throughout    Neurologic:  Moves all extremities. AAOx3,      Psych:  Good insight, Not anxious nor agitated. Data Review:    Review and/or order of clinical lab test      Labs:     Recent Labs      08/08/18   0504  08/07/18   2141   WBC  8.3  8.2   HGB  12.3  12.9   HCT  36.0*  36.1*   PLT  211  230     Recent Labs      08/08/18   0504  08/07/18   2141   NA  134*  136   K  4.3  4.4   CL  105  105   CO2  20*  18*   BUN  24*  23*   CREA  1.47*  1.77*   GLU  279*  323*   CA  8.1*  7.0*   MG  2.1   --      Recent Labs      08/07/18 2141   SGOT  34   ALT  49   AP  105   TBILI  0.4   TP  6.2*   ALB  3.0*   GLOB  3.2     No results for input(s): INR, PTP, APTT in the last 72 hours. No lab exists for component: INREXT   No results for input(s): FE, TIBC, PSAT, FERR in the last 72 hours. No results found for: FOL, RBCF   No results for input(s): PH, PCO2, PO2 in the last 72 hours.   Recent Labs      08/08/18   0504  08/08/18   0113  08/07/18   2141   CPK  207   --    --    TROIQ  0.30*  <0.05  <0.05     No results found for: CHOL, CHOLX, CHLST, CHOLV, HDL, LDL, LDLC, DLDLP, TGLX, TRIGL, TRIGP, CHHD, CHHDX  Lab Results   Component Value Date/Time    Glucose (POC) 308 (H) 08/08/2018 01:04 AM    Glucose (POC) 217 (H) 02/08/2018 06:30 AM    Glucose (POC) 176 (H) 02/07/2018 04:41 PM    Glucose (POC) 181 (H) 02/07/2018 11:38 AM    Glucose (POC) 188 (H) 02/07/2018 07:02 AM     Lab Results   Component Value Date/Time    Color YELLOW/STRAW 08/08/2018 01:13 AM    Appearance CLEAR 08/08/2018 01:13 AM    Specific gravity 1.023 08/08/2018 01:13 AM    Specific gravity 1.015 10/15/2014 05:38 PM    pH (UA) 6.0 08/08/2018 01:13 AM    Protein 100 (A) 08/08/2018 01:13 AM    Glucose >1000 (A) 08/08/2018 01:13 AM    Ketone NEGATIVE 08/08/2018 01:13 AM    Bilirubin NEGATIVE  08/08/2018 01:13 AM    Urobilinogen 0.2 08/08/2018 01:13 AM    Nitrites NEGATIVE  08/08/2018 01:13 AM    Leukocyte Esterase NEGATIVE  08/08/2018 01:13 AM    Epithelial cells FEW 08/08/2018 01:13 AM    Bacteria NEGATIVE  08/08/2018 01:13 AM    WBC 0-4 08/08/2018 01:13 AM    RBC 0-5 08/08/2018 01:13 AM         Medications Reviewed:     Current Facility-Administered Medications   Medication Dose Route Frequency    carvedilol (COREG) tablet 25 mg  25 mg Oral BID WITH MEALS    gabapentin (NEURONTIN) tablet 600 mg  600 mg Oral TID    folic acid (FOLVITE) tablet 1 mg  1 mg Oral DAILY    insulin glargine (LANTUS) injection 22 Units  22 Units SubCUTAneous DAILY    fish oil-omega-3 fatty acids 340-1,000 mg capsule 1 Cap  1 Cap Oral DAILY    rosuvastatin (CRESTOR) tablet 40 mg  40 mg Oral DAILY    sertraline (ZOLOFT) tablet 100 mg  100 mg Oral DAILY    ticagrelor (BRILINTA) tablet 90 mg  90 mg Oral BID    aspirin chewable tablet 81 mg  81 mg Oral DAILY    sodium chloride (NS) flush 5-10 mL  5-10 mL IntraVENous Q8H    sodium chloride (NS) flush 5-10 mL  5-10 mL IntraVENous PRN    0.9% sodium chloride infusion  1.5 mL/kg/hr IntraVENous PRN    sodium chloride (NS) flush 5-10 mL  5-10 mL IntraVENous Q8H    sodium chloride (NS) flush 5-10 mL  5-10 mL IntraVENous PRN    nitroglycerin (NITROSTAT) tablet 0.4 mg  0.4 mg SubLINGual Q5MIN PRN    calcium carbonate (TUMS) chewable tablet 400 mg [elemental]  400 mg Oral DAILY    ibuprofen (MOTRIN) tablet 400 mg  400 mg Oral Q6H PRN    . PHARMACY TO SUBSTITUTE PER PROTOCOL (Reordered from: insulin aspart (NOVOLOG FLEXPEN) 100 unit/mL in)    Per Protocol    albuterol (PROVENTIL HFA, VENTOLIN HFA, PROAIR HFA) inhaler 2 Puff  2 Puff Inhalation Q4H PRN    alum-mag hydroxide-simeth (MYLANTA) oral suspension 30 mL  30 mL Oral TID PRN    lisinopril (PRINIVIL, ZESTRIL) tablet 10 mg  10 mg Oral DAILY    ticagrelor (BRILINTA) tablet 90 mg  90 mg Oral Q12H    nitroglycerin (NITROSTAT) tablet 0.4 mg  0.4 mg SubLINGual Q5MIN PRN    nitroglycerin (NITROSTAT) tablet 0.4 mg  0.4 mg SubLINGual Q5MIN PRN     ______________________________________________________________________  EXPECTED LENGTH OF STAY: - - -  ACTUAL LENGTH OF STAY:          0                 Richar Silverio MD

## 2018-08-09 VITALS
RESPIRATION RATE: 18 BRPM | SYSTOLIC BLOOD PRESSURE: 111 MMHG | TEMPERATURE: 97.7 F | BODY MASS INDEX: 30.64 KG/M2 | OXYGEN SATURATION: 99 % | WEIGHT: 202.16 LBS | HEART RATE: 71 BPM | DIASTOLIC BLOOD PRESSURE: 65 MMHG | HEIGHT: 68 IN

## 2018-08-09 LAB
ALBUMIN SERPL-MCNC: 3.1 G/DL (ref 3.5–5)
ALBUMIN/GLOB SERPL: 0.8 {RATIO} (ref 1.1–2.2)
ALP SERPL-CCNC: 89 U/L (ref 45–117)
ALT SERPL-CCNC: 42 U/L (ref 12–78)
ANION GAP SERPL CALC-SCNC: 13 MMOL/L (ref 5–15)
AST SERPL-CCNC: 25 U/L (ref 15–37)
BASOPHILS # BLD: 0.1 K/UL (ref 0–0.1)
BASOPHILS NFR BLD: 1 % (ref 0–1)
BILIRUB SERPL-MCNC: 0.3 MG/DL (ref 0.2–1)
BUN SERPL-MCNC: 19 MG/DL (ref 6–20)
BUN/CREAT SERPL: 15 (ref 12–20)
CALCIUM SERPL-MCNC: 9 MG/DL (ref 8.5–10.1)
CHLORIDE SERPL-SCNC: 101 MMOL/L (ref 97–108)
CO2 SERPL-SCNC: 19 MMOL/L (ref 21–32)
CREAT SERPL-MCNC: 1.23 MG/DL (ref 0.7–1.3)
DIFFERENTIAL METHOD BLD: ABNORMAL
EOSINOPHIL # BLD: 0.4 K/UL (ref 0–0.4)
EOSINOPHIL NFR BLD: 4 % (ref 0–7)
ERYTHROCYTE [DISTWIDTH] IN BLOOD BY AUTOMATED COUNT: 13 % (ref 11.5–14.5)
GLOBULIN SER CALC-MCNC: 3.8 G/DL (ref 2–4)
GLUCOSE BLD STRIP.AUTO-MCNC: 217 MG/DL (ref 65–100)
GLUCOSE BLD STRIP.AUTO-MCNC: 303 MG/DL (ref 65–100)
GLUCOSE SERPL-MCNC: 222 MG/DL (ref 65–100)
HCT VFR BLD AUTO: 39 % (ref 36.6–50.3)
HGB BLD-MCNC: 13.1 G/DL (ref 12.1–17)
IMM GRANULOCYTES # BLD: 0.1 K/UL (ref 0–0.04)
IMM GRANULOCYTES NFR BLD AUTO: 1 % (ref 0–0.5)
LYMPHOCYTES # BLD: 3 K/UL (ref 0.8–3.5)
LYMPHOCYTES NFR BLD: 36 % (ref 12–49)
MCH RBC QN AUTO: 32.4 PG (ref 26–34)
MCHC RBC AUTO-ENTMCNC: 33.6 G/DL (ref 30–36.5)
MCV RBC AUTO: 96.5 FL (ref 80–99)
MONOCYTES # BLD: 0.4 K/UL (ref 0–1)
MONOCYTES NFR BLD: 5 % (ref 5–13)
NEUTS SEG # BLD: 4.7 K/UL (ref 1.8–8)
NEUTS SEG NFR BLD: 54 % (ref 32–75)
NRBC # BLD: 0 K/UL (ref 0–0.01)
NRBC BLD-RTO: 0 PER 100 WBC
PLATELET # BLD AUTO: 209 K/UL (ref 150–400)
PMV BLD AUTO: 10.3 FL (ref 8.9–12.9)
POTASSIUM SERPL-SCNC: 4.4 MMOL/L (ref 3.5–5.1)
PROT SERPL-MCNC: 6.9 G/DL (ref 6.4–8.2)
RBC # BLD AUTO: 4.04 M/UL (ref 4.1–5.7)
SERVICE CMNT-IMP: ABNORMAL
SERVICE CMNT-IMP: ABNORMAL
SODIUM SERPL-SCNC: 133 MMOL/L (ref 136–145)
WBC # BLD AUTO: 8.6 K/UL (ref 4.1–11.1)

## 2018-08-09 PROCEDURE — 74011250637 HC RX REV CODE- 250/637: Performed by: HOSPITALIST

## 2018-08-09 PROCEDURE — 74011250636 HC RX REV CODE- 250/636: Performed by: NURSE PRACTITIONER

## 2018-08-09 PROCEDURE — 74011250637 HC RX REV CODE- 250/637: Performed by: INTERNAL MEDICINE

## 2018-08-09 PROCEDURE — 74011250637 HC RX REV CODE- 250/637: Performed by: FAMILY MEDICINE

## 2018-08-09 PROCEDURE — 36415 COLL VENOUS BLD VENIPUNCTURE: CPT | Performed by: HOSPITALIST

## 2018-08-09 PROCEDURE — 74011636637 HC RX REV CODE- 636/637: Performed by: INTERNAL MEDICINE

## 2018-08-09 PROCEDURE — 74011636637 HC RX REV CODE- 636/637: Performed by: HOSPITALIST

## 2018-08-09 PROCEDURE — 80053 COMPREHEN METABOLIC PANEL: CPT | Performed by: HOSPITALIST

## 2018-08-09 PROCEDURE — 82962 GLUCOSE BLOOD TEST: CPT

## 2018-08-09 PROCEDURE — 85025 COMPLETE CBC W/AUTO DIFF WBC: CPT | Performed by: HOSPITALIST

## 2018-08-09 RX ORDER — LISINOPRIL 20 MG/1
20 TABLET ORAL DAILY
Status: DISCONTINUED | OUTPATIENT
Start: 2018-08-09 | End: 2018-08-09 | Stop reason: HOSPADM

## 2018-08-09 RX ORDER — HYDRALAZINE HYDROCHLORIDE 20 MG/ML
10 INJECTION INTRAMUSCULAR; INTRAVENOUS ONCE
Status: COMPLETED | OUTPATIENT
Start: 2018-08-09 | End: 2018-08-09

## 2018-08-09 RX ORDER — LISINOPRIL 20 MG/1
20 TABLET ORAL DAILY
Qty: 30 TAB | Refills: 0 | Status: ON HOLD | OUTPATIENT
Start: 2018-08-10 | End: 2020-03-13

## 2018-08-09 RX ADMIN — GABAPENTIN 600 MG: 600 TABLET, FILM COATED ORAL at 08:34

## 2018-08-09 RX ADMIN — HYDROCODONE BITARTRATE AND ACETAMINOPHEN 1 TABLET: 10; 325 TABLET ORAL at 03:22

## 2018-08-09 RX ADMIN — FOLIC ACID 1 MG: 1 TABLET ORAL at 08:35

## 2018-08-09 RX ADMIN — INSULIN LISPRO 14 UNITS: 100 INJECTION, SOLUTION INTRAVENOUS; SUBCUTANEOUS at 11:49

## 2018-08-09 RX ADMIN — LISINOPRIL 20 MG: 20 TABLET ORAL at 08:35

## 2018-08-09 RX ADMIN — Medication 1 CAPSULE: at 08:34

## 2018-08-09 RX ADMIN — ASPIRIN 81 MG 81 MG: 81 TABLET ORAL at 08:34

## 2018-08-09 RX ADMIN — HYDRALAZINE HYDROCHLORIDE 10 MG: 20 INJECTION INTRAMUSCULAR; INTRAVENOUS at 04:26

## 2018-08-09 RX ADMIN — CARVEDILOL 25 MG: 12.5 TABLET, FILM COATED ORAL at 08:34

## 2018-08-09 RX ADMIN — SERTRALINE HYDROCHLORIDE 100 MG: 50 TABLET ORAL at 08:35

## 2018-08-09 RX ADMIN — ROSUVASTATIN CALCIUM 40 MG: 40 TABLET, FILM COATED ORAL at 08:34

## 2018-08-09 RX ADMIN — TICAGRELOR 90 MG: 90 TABLET ORAL at 08:35

## 2018-08-09 RX ADMIN — INSULIN GLARGINE 25 UNITS: 100 INJECTION, SOLUTION SUBCUTANEOUS at 08:35

## 2018-08-09 RX ADMIN — HYDROCODONE BITARTRATE AND ACETAMINOPHEN 1 TABLET: 10; 325 TABLET ORAL at 09:49

## 2018-08-09 RX ADMIN — Medication 10 ML: at 05:06

## 2018-08-09 RX ADMIN — INSULIN LISPRO 14 UNITS: 100 INJECTION, SOLUTION INTRAVENOUS; SUBCUTANEOUS at 08:35

## 2018-08-09 NOTE — CARDIO/PULMONARY
Cardiac Rehab: Irene Ley is dressed and awaiting discharge. Offered to answer any outstanding questions he has however he declined having questions. Strong cigarettes odor in room. Patient reports he \"quit a month ago but my girlfriend still smokes. She will have to do it outside from now on.\". Offered smoking cessation program flyer for him and his girlfriend and he declined. Reinforced enrollment in cardiac rehab at the [facility]. Irene Ley verbalized understanding with questions answered.

## 2018-08-09 NOTE — DIABETES MGMT
DTC Progress Note    Recommendations/ Comments: Chart reviewed for hyperglycemia. BG ranging 217-311 mg/dl     If appropriate, please consider:  - Ordering Humalog for correction, normal sensitivity scale    Adding carbohydrate consistent restriction to current order    Current hospital DM medication: Lantus 25 units AM and  Humalog 14 units AC     Chart reviewed on Hugo Hartmann.    Patient is a 53 y.o. male with hx  Type 2 Diabetes on Lantus 22 units daily and Humalog 14 units AC  at home. A1c:   Lab Results   Component Value Date/Time    Hemoglobin A1c 8.0 (H) 02/22/2012 04:45 AM       Recent Glucose Results:   Lab Results   Component Value Date/Time     (H) 08/09/2018 04:04 AM    GLUCPOC 217 (H) 08/09/2018 06:29 AM    GLUCPOC 282 (H) 08/08/2018 04:26 PM    GLUCPOC 311 (H) 08/08/2018 01:33 PM        Lab Results   Component Value Date/Time    Creatinine 1.23 08/09/2018 04:04 AM     Estimated Creatinine Clearance: 81.6 mL/min (based on Cr of 1.23). Active Orders   Diet    DIET CARDIAC Regular        PO intake: No data found. Will continue to follow as needed.     Thank you  Chayo Ball RD, [address]  Pager: [phone]

## 2018-08-09 NOTE — PROGRESS NOTES
Cardiology Progress Note                                        Admit Date: 8/7/2018    Assessment/Plan:     CAD; s/p additional stents in LCX graft to keep OM1 open; stable; he can be discharged  OM2 origin lesion; could not get to; will bring him back in 3 months and relook  EF 55% by both cath and echo  H/p remote CABG; will try to keep both SVG-LCX and LIMA-LAD patent  HTN; he is anxious about something; perhaps smoking; will increase his Coreg to 25 mg BID and increase Lisinopril to 20 mg everyda    Maddison Melissa is a 53 y.o. male with     PROBLEM LIST:  Patient Active Problem List    Diagnosis Date Noted    Dehydration 08/08/2018    Acute chest pain 08/08/2018    Type 2 diabetes mellitus with hyperglycemia ([facility] 08/08/2018    NSTEMI (non-ST elevated myocardial infarction) ([facility] 03/18/2017    Acute non Q wave MI (myocardial infarction), initial episode of care ([facility] 02/23/2012    Chest pain at rest 02/22/2012    CAD (coronary artery disease) 02/22/2012    HTN (hypertension) 02/22/2012    Hyperlipidemia 02/22/2012    Unstable angina ([facility] 02/22/2012         Subjective:     Maddison Melissa reports near syncope. Visit Vitals    BP (!) 167/94    Pulse 73    Temp 98 °F (36.7 °C)    Resp 18    Ht 5' 8\" (1.727 m)    Wt 91.7 kg (202 lb 2.6 oz)    SpO2 99%    BMI 30.74 kg/m2       Intake/Output Summary (Last 24 hours) at 08/09/18 0626  Last data filed at 08/09/18 0345   Gross per 24 hour   Intake              180 ml   Output              850 ml   Net             -670 ml       Objective:      Physical Exam:  HEENT: Perrla, EOMI  Neck: No JVD,  No thyroidmegaly  Resp: CTA bilaterally;  No wheezes or rales  CV: RRR s1s2 No murmur no s3  Abd:Soft, Nontender  Ext: No edema  Neuro: Alert and oriented; Nonfocal  Skin: Warm, Dry, Intact  Pulses: 2+ DP/PT/Rad      Telemetry: normal sinus rhythm    Current Facility-Administered Medications   Medication Dose Route Frequency    lisinopril (PRINIVIL, ZESTRIL) tablet 20 mg  20 mg Oral DAILY    carvedilol (COREG) tablet 25 mg  25 mg Oral BID WITH MEALS    gabapentin (NEURONTIN) tablet 600 mg  600 mg Oral TID    folic acid (FOLVITE) tablet 1 mg  1 mg Oral DAILY    fish oil-omega-3 fatty acids 340-1,000 mg capsule 1 Cap  1 Cap Oral DAILY    rosuvastatin (CRESTOR) tablet 40 mg  40 mg Oral DAILY    sertraline (ZOLOFT) tablet 100 mg  100 mg Oral DAILY    ticagrelor (BRILINTA) tablet 90 mg  90 mg Oral BID    aspirin chewable tablet 81 mg  81 mg Oral DAILY    sodium chloride (NS) flush 5-10 mL  5-10 mL IntraVENous Q8H    sodium chloride (NS) flush 5-10 mL  5-10 mL IntraVENous PRN    sodium chloride (NS) flush 5-10 mL  5-10 mL IntraVENous Q8H    sodium chloride (NS) flush 5-10 mL  5-10 mL IntraVENous PRN    nitroglycerin (NITROSTAT) tablet 0.4 mg  0.4 mg SubLINGual Q5MIN PRN    calcium carbonate (TUMS) chewable tablet 400 mg [elemental]  400 mg Oral DAILY    ibuprofen (MOTRIN) tablet 400 mg  400 mg Oral Q6H PRN    alum-mag hydroxide-simeth (MYLANTA) oral suspension 30 mL  30 mL Oral TID PRN    insulin lispro (HUMALOG) injection 14 Units  14 Units SubCUTAneous TIDAC    albuterol (PROVENTIL VENTOLIN) nebulizer solution 2.5 mg  2.5 mg Nebulization Q4H PRN    insulin glargine (LANTUS) injection 25 Units  25 Units SubCUTAneous DAILY    HYDROcodone-acetaminophen (NORCO)  mg tablet 1 Tab  1 Tab Oral Q6H PRN    nitroglycerin (NITROSTAT) tablet 0.4 mg  0.4 mg SubLINGual Q5MIN PRN    nitroglycerin (NITROSTAT) tablet 0.4 mg  0.4 mg SubLINGual Q5MIN PRN         Data Review:   Labs:    Recent Results (from the past 24 hour(s))   ECG RHYTHM ANALYSIS ADULT    Collection Time: 08/08/18  9:04 AM   Result Value Ref Range    Ventricular Rate 92 BPM    Atrial Rate 92 BPM    P-R Interval 172 ms    QRS Duration 104 ms    Q-T Interval 376 ms    QTC Calculation (Bezet) 464 ms    Calculated P Axis 62 degrees    Calculated R Axis 76 degrees    Calculated T Axis -135 degrees Diagnosis       Normal sinus rhythm  Inferior infarct , age undetermined  ST & T wave abnormality, consider lateral ischemia  When compared with ECG of 08-AUG-2018 05:23,  MANUAL COMPARISON REQUIRED, DATA IS UNCONFIRMED  Confirmed by Aracelis Vazquez ([id number]) on 8/8/2018 11:53:18 AM     GLUCOSE, POC    Collection Time: 08/08/18  1:33 PM   Result Value Ref Range    Glucose (POC) 311 (H) 65 - 100 mg/dL    Performed by Dennis Acosta    GLUCOSE, POC    Collection Time: 08/08/18  4:26 PM   Result Value Ref Range    Glucose (POC) 282 (H) 65 - 100 mg/dL    Performed by Unkown     CBC WITH AUTOMATED DIFF    Collection Time: 08/09/18  4:04 AM   Result Value Ref Range    WBC 8.6 4.1 - 11.1 K/uL    RBC 4.04 (L) 4.10 - 5.70 M/uL    HGB 13.1 12.1 - 17.0 g/dL    HCT 39.0 36.6 - 50.3 %    MCV 96.5 80.0 - 99.0 FL    MCH 32.4 26.0 - 34.0 PG    MCHC 33.6 30.0 - 36.5 g/dL    RDW 13.0 11.5 - 14.5 %    PLATELET 268 937 - 191 K/uL    MPV 10.3 8.9 - 12.9 FL    NRBC 0.0 0  WBC    ABSOLUTE NRBC 0.00 0.00 - 0.01 K/uL    NEUTROPHILS 54 32 - 75 %    LYMPHOCYTES 36 12 - 49 %    MONOCYTES 5 5 - 13 %    EOSINOPHILS 4 0 - 7 %    BASOPHILS 1 0 - 1 %    IMMATURE GRANULOCYTES 1 (H) 0.0 - 0.5 %    ABS. NEUTROPHILS 4.7 1.8 - 8.0 K/UL    ABS. LYMPHOCYTES 3.0 0.8 - 3.5 K/UL    ABS. MONOCYTES 0.4 0.0 - 1.0 K/UL    ABS. EOSINOPHILS 0.4 0.0 - 0.4 K/UL    ABS. BASOPHILS 0.1 0.0 - 0.1 K/UL    ABS. IMM.  GRANS. 0.1 (H) 0.00 - 0.04 K/UL    DF AUTOMATED     METABOLIC PANEL, COMPREHENSIVE    Collection Time: 08/09/18  4:04 AM   Result Value Ref Range    Sodium 133 (L) 136 - 145 mmol/L    Potassium 4.4 3.5 - 5.1 mmol/L    Chloride 101 97 - 108 mmol/L    CO2 19 (L) 21 - 32 mmol/L    Anion gap 13 5 - 15 mmol/L    Glucose 222 (H) 65 - 100 mg/dL    BUN 19 6 - 20 MG/DL    Creatinine 1.23 0.70 - 1.30 MG/DL    BUN/Creatinine ratio 15 12 - 20      GFR est AA >60 >60 ml/min/1.73m2    GFR est non-AA >60 >60 ml/min/1.73m2    Calcium 9.0 8.5 - 10.1 MG/DL    Bilirubin, total 0.3 0.2 - 1.0 MG/DL    ALT (SGPT) 42 12 - 78 U/L    AST (SGOT) 25 15 - 37 U/L    Alk.  phosphatase 89 45 - 117 U/L    Protein, total 6.9 6.4 - 8.2 g/dL    Albumin 3.1 (L) 3.5 - 5.0 g/dL    Globulin 3.8 2.0 - 4.0 g/dL    A-G Ratio 0.8 (L) 1.1 - 2.2

## 2018-08-09 NOTE — PROGRESS NOTES
Spiritual Care Partner Volunteer visited patient in Rm 460 on 8/9/18. Documented by:   Chaplain De La Vega MDiv, MACE  [phone] PRAY [phone]

## 2018-08-09 NOTE — PROGRESS NOTES
Reason for Admission:   Chest pain                   RRAT Score:        11             Plan for utilizing home health:      Patient declined an [facility]. Likelihood of Readmission:  Low-Moderate                         Transition of Care Plan:         Patient to discharge home and follow up with the [place]. CM met with patient at bedside to inform of CM role and to assess needs. Patient verified demographic info. Patient lives at home with his girlfriend whom he reports he is the caregiver to as she has a tumor on her back, left sided paralysis due to blood clot and stroke, broken tibia and spinal issues. Patient verified that he is seen at the [place] ([facility]) and was last seen by Dr. Francis Ray a couple months ago but he is not sure if he has been reassigned a new Dr as Dr. Francis Ray is no longer there. Patient reports that he does not have any income and would like to apply for disability. He reports that he has worked as a  for many years but now just takes care of his girlfriend. CM provided [facility] resources. Patient is independent with self-care and ADLs and does not use any DME. He has an extensive cardiac history with the [place]. During this admission he has had three stints placed. Patient reports that he does not have any transportation home. CM agreed to see if a cab voucher could be provided. Patient prefers [place] pharmacy or [facility]. CM to monitor. Velia Ortiz, MS         11:30 am- CM informed by RN that patient had a ride home earlier. CM followed up with patient to see if he could make transportation arrangements with his friends/family. Patient stated that he should be able to but his friends work. Patient agreed that he will try to make arrangements when he is given a discharge time. CM to monitor. Mariella Corona, MS    12:00 pm- CM met with patient to review [facility] transfer form. Patient agreeable to transfer if not discharged today.  CM left form on chart for physician to review and sign. When complete, CM will fax form along with medicals to the [place] transfer [place]. Becky Corona MS    2:30 pm- CM faxed transfer form, discharge and clinical info to the [facility] at [phone]. CM also contacted the transfer center to see if there were any transportation services to assist patient home. CM informed that there aren't any services available. CM also asked about follow up appt. CM informed that once discharge summary is received by the transfer center, a consult will be made to the [facility] to schedule a follow up appt for patient within seven to ten days. It was reported that the patient has an appt scheduled with [place] cardiology September 7 and with the [facility] in October. CM to monitor.      Cecilia Herrera MS

## 2018-08-09 NOTE — PROGRESS NOTES
CM contacted [place] transfer center at [phone] and communicated with Evita Mcgaryr at [address] to inform of patient's admission and possible discharge today. CM asked if there was anything that was needed. Evita Mcgarry to fax over transfer packet and has requested that CM fax clinical info and discharge summary to [phone]. GUSTAVO to monitor.

## 2018-08-09 NOTE — DISCHARGE SUMMARY
DISCHARGE SUMMARY        PATIENT ID: Iliana Persaud  MRN: [id number]   YOB: 1970   AGE: 53 y.o. DATE OF ADMISSION: 8/7/2018  9:18 PM    DATE OF DISCHARGE: 8/9/2018  PRIMARY CARE PROVIDER: None     Procedure Performed:  * No surgery found *  Surgical  Cardiology and IR Orders (Through next 24h)    Start     Ordered    08/08/18 0745  CARDIAC CATHETERIZATION  [[id number]]  ONE TIME      08/08/18 1543            DISCHARGING PHYSICIAN: Yoselyn Gu MD    Call hospitalist office [phone]. Discharge Diagnosis:   Patient Active Problem List    Diagnosis Date Noted    Dehydration 08/08/2018    Acute chest pain 08/08/2018    Type 2 diabetes mellitus with hyperglycemia ([facility] [address] [address] 08/08/2018    NSTEMI (non-ST elevated myocardial infarction) ([facility] [address] 03/18/2017    Acute non Q wave MI (myocardial infarction), initial episode of care [facility]) 02/23/2012    Chest pain at rest 02/22/2012    CAD (coronary artery disease) 02/22/2012    HTN (hypertension) 02/22/2012    Hyperlipidemia 02/22/2012    Unstable angina ([facility] [address] 02/22/2012              CONSULTATIONS: IP CONSULT TO HOSPITALIST  IP CONSULT TO CARDIOLOGY    History of Present Ilness:     A 81-year-old white male with past medical history of coronary artery disease, ischemic cardiomyopathy, myocardial infarction, hypertension, hyperlipidemia, type 2 diabetes mellitus, endocarditis, presented to the emergency department from home via EMS with chief complaint of chest pain. Symptoms onset reportedly began approximately 3 days ago. He notes that pain is severe, diffuse, pointing to the substernal region of his chest into his neck and both arms, described as \"like a vice . \"  Currently rated 8-9/10 without specific exacerbating or relieving factors. He notes that he has had similar presentations with prior history of \"blocked arteries. \"  The patient complains of shortness of breath, which he describes as [place] somebody choking me. \"  He has a past history of having unstable angina, was last hospitalized for the same from 02/06/2018 and 02/08/2018. During that hospitalization on 02/07/2018 he underwent a left heart catheterization which showed a left ventricular ejection fraction to 45% to 50%. Patient had been followed at the [facility] for cardiology coverage; however, he notes that it has been at least over a year since the last time he was seen in consultation. He reportedly has some dizziness starting approximately a week ago and has felt dehydrated. He notes that he was unable to check his blood glucose levels due to a dead battery on his glucose monitor. He notes, however, he normally takes Lantus 22 units subcutaneously q.a.m. in addition to additional insulin coverage. 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 He does not complain of syncope, loss of consciousness, current headache, neck pain, back pain, abdominal pain, nausea, vomiting, diarrhea, melena, dysuria, hematuria, calf pain, swelling, edema, fever, chills or rash. Upon arrival to the emergency department, initial recorded vital signs, blood pressure 145/87, heart rate 95, respiratory rate 18, O2 saturation 95% on room air. Workup included labs showing troponin less than 0.05. A 12-lead EKG showed normal sinus rhythm, ST changes in inferior leads and ST and T-wave changes in the lateral leads and 95 beats per minute. Patient now seen for admission to the hospitalist service for continued evaluation and treatment. Hospital Course:     Patient was admitted for chest pain. Troponin was elevated .  Cardiology was consulted and he had cardiac catheterization on 8/8 with showed LVG; EF 40% with inferobasal akinesis; LM is ok; LAD is occluded at proximal and LCX is also occluded at proximal; RCA is occluded at proximal but PDA is filled by collaterals from Lt system; SVG-LCX with proximal 75% and insertion 95% and OM1 proximal 90% lesions, all ISR lesion. He was treated with stents. His chest pain has improved. He continued taking aspirin, brilinta ,crestor and  coreg . Cardiology increased his dose of lisinopril to 20 mg every day. Per cardiology he will; have to be brought back in 3 months for a repeat cath. Patient follows the [place] system for his care. At time of discharge he was stable. Physical Exam on Discharge:  Visit Vitals    /65 (BP 1 Location: Right arm, BP Patient Position: Head of bed elevated (Comment degrees))    Pulse 71    Temp 97.7 °F (36.5 °C)    Resp 18    Ht 5' 8\" (1.727 m)    Wt 91.7 kg (202 lb 2.6 oz)    SpO2 99%    BMI 30.74 kg/m2       General:  Alert, cooperative, no distress, appears stated age. Lungs:   Clear to auscultation bilaterally. Chest wall:  No tenderness or deformity. Heart:  Regular rate and rhythm, S1, S2 normal, no murmur, click, rub or gallop. Abdomen:   Soft, non-tender. Bowel sounds normal. No masses,  No organomegaly. Extremities: Extremities normal, atraumatic, no cyanosis or edema. Pulses: 2+ and symmetric all extremities. Skin: Skin color, texture, turgor normal. No rashes or lesions   Neurologic: CNII-XII intact. Pertinent Results:    Recent Labs      18   0404   BUN  19   NA  133*   CO2  19*     Recent Labs      18   0404   WBC  8.6   RBC  4.04*   HCT  39.0   MCV  96.5   MCH  32.4   MCHC  33.6   RDW  13.0       ECHO   ST[facility]  [facility], [address]  [phone]    Transthoracic Echocardiogram    Patient: Rafita Nicolas  MRN: [id number]  [address] #: [de-identified]  : 1970  Age: 53 years  Gender: Male  Height: 68 in  Weight: 200.6 lb  BSA: 2.05 mï¾²  BP: 148 / 86 mmHg  Study date: 08-Aug-2018  Status: Routine  Location: Cath lab  [facility] #: [id number]    Allergies: TRAMADOL    Ordering Physician: Justin Meza MD  Reading Group:  S Group  Technologist: MENDEZ Camargo  Reading Physician: Lashay Zhang MD    SUMMARY:  Left ventricle: Systolic function was normal. Ejection fraction was  estimated in the range of 55 % to 60 %. There were no regional wall motion  abnormalities. Left atrium: The atrium was dilated. Mitral valve: There was mild regurgitation. Tricuspid valve: There was mild regurgitation. INDICATIONS: Chest pain. PROCEDURE: This was a routine study. The study included complete 2D  imaging, M-mode, complete spectral Doppler, and color Doppler. The heart  rate was 88 bpm, at the start of the study. Systolic blood pressure was  148 mmHg, at the start of the study. Diastolic blood pressure was 86 mmHg,  at the start of the study. LEFT VENTRICLE: Size was normal. Systolic function was normal. Ejection  fraction was estimated in the range of 55 % to 60 %. There were no  regional wall motion abnormalities. Wall thickness was normal.    RIGHT VENTRICLE: The size was normal. Systolic function was normal. Wall  thickness was normal.    LEFT ATRIUM: The atrium was dilated. RIGHT ATRIUM: Size was normal.    MITRAL VALVE: There was mild thickening. DOPPLER: There was mild  regurgitation. AORTIC VALVE: The valve was trileaflet. Leaflets exhibited normal  thickness and normal cuspal separation. DOPPLER: Transaortic velocity was  within the normal range. There was no stenosis. There was no regurgitation. TRICUSPID VALVE: Normal valve structure. There was normal leaflet  separation. DOPPLER: The transtricuspid velocity was within the normal  range. There was no evidence for tricuspid stenosis. There was mild  regurgitation. PULMONIC VALVE: Leaflets exhibited normal thickness, no calcification, and  normal cuspal separation. DOPPLER: The transpulmonic velocity was within  the normal range. There was no regurgitation. AORTA: The root exhibited normal size. PERICARDIUM: There was no pericardial effusion.  The pericardium was normal  in appearance. SYSTEM MEASUREMENT TABLES    2D  Ao Diam: 3.3 cm  LA Diam: 4.5 cm  LAAs A2C: 18.4 cm2  LAAs A4C: 19.8 cm2  LAESV A-L A2C: 53.4 ml  LAESV A-L A4C: 54 ml  LAESV Index (A-L): 28.1 ml/m2  LAESV MOD A2C: 49.4 ml  LAESV MOD A4C: 52.3 ml  LAESV(A-L): 57.5 ml  LALs A2C: 5.4 cm  LALs A4C: 6.2 cm  LVOT Diam: 1.7 cm  %FS: 33.9 %  EDV(Teich): 112.2 ml  EF(Teich): 62.6 %  ESV(Teich): 41.9 ml  IVSd: 0.7 cm  LVIDd: 4.9 cm  LVIDs: 3.2 cm  LVPWd: 0.7 cm  SV(Teich): 70.3 ml  RA Area: 10.6 cm2  RVIDd: 2.9 cm    CW  TR Vmax: 2.9 m/s  TR maxP mmHg    MM  TAPSE: 1.2 cm    PW  LVOT Vmax: 1.1 m/s  LVOT maxP mmHg  MV A Kade: 0.9 m/s  MV Dec Gar[place]: 7.4 m/s2  MV DecT: 168 ms  MV E Kade: 1.2 m/s  MV E/A Ratio: 1.4  MV PHT: 48.7 ms  MVA By PHT: 4.5 cm2  PV Vmax: 0.6 m/s  PV maxP.7 mmHg  RVOT Vmax: 0.7 m/s  RVOT maxP.2 mmHg    Prepared and E-signed by    Mehrdad Sorenson MD  Signed 09-Aug-2018 05:57:31    CXR   EXAM: Portable CXR.  2206 hours       INDICATION: Chest pain     The lungs are clear. Heart is normal in size. There is prior median sternotomy. There is no overt pulmonary edema. There is no evident pneumothorax, adenopathy  or pleural effusion.     IMPRESSION  IMPRESSION: No Acute Disease. DISCHARGE MEDICATIONS:   Current Discharge Medication List      CONTINUE these medications which have CHANGED    Details   lisinopril (PRINIVIL, ZESTRIL) 20 mg tablet Take 1 Tab by mouth daily. Qty: 30 Tab, Refills: 0         CONTINUE these medications which have NOT CHANGED    Details   folic acid (FOLVITE) 1 mg tablet Take  by mouth daily. sertraline (ZOLOFT) 100 mg tablet Take 100 mg by mouth daily. !! ticagrelor (BRILINTA) 90 mg tablet Take 90 mg by mouth two (2) times a day. rosuvastatin (CRESTOR) 40 mg tablet Take 40 mg by mouth daily.       albuterol (PROVENTIL HFA, VENTOLIN HFA, PROAIR HFA) 90 mcg/actuation inhaler Take 2 Puffs by inhalation every four (4) hours as needed for Wheezing. alum-mag hydroxide-simeth (MYLANTA) 200-200-20 mg/5 mL susp Take 30 mL by mouth three (3) times daily as needed. carvedilol (COREG) 12.5 mg tablet Take 25 mg by mouth two (2) times daily (with meals). gabapentin (NEURONTIN) 600 mg tablet Take 600 mg by mouth three (3) times daily. Indications: NEUROPATHIC PAIN      insulin glargine (LANTUS) 100 unit/mL injection 22 Units by SubCUTAneous route daily. Indications: takes in the am      insulin aspart (NOVOLOG FLEXPEN) 100 unit/mL inpn 14 Units by SubCUTAneous route Before breakfast, lunch, and dinner. Indications: Gestational Diabetes Mellitus      aspirin 81 mg chewable tablet Take 81 mg by mouth daily. nitroglycerin (NITROSTAT) 0.4 mg SL tablet by SubLINGual route every five (5) minutes as needed. !! ticagrelor (BRILINTA) 90 mg tablet Take 1 Tab by mouth every twelve (12) hours every twelve (12) hours. Qty: 60 Tab, Refills: 6      omega 3-dha-epa-fish oil (FISH OIL) 100-160-1,000 mg cap Take 1,000 mg by mouth daily. ibuprofen (MOTRIN) 400 mg tablet Take 1 tablet by mouth every six (6) hours as needed for Pain. Qty: 20 tablet, Refills: 0      calcium carbonate (TUMS) 200 mg calcium (500 mg) Chew Take 2 Tabs by mouth as needed. !! - Potential duplicate medications found. Please discuss with provider. Condition at discharge:  Afebrile  Ambulating  Eating, Drinking, Voiding  Stable    FOLLOW UP APPOINTMENTS:    Follow-up Information     Follow up With Details Comments Contact Info    Call to enroll in Cardiac Rehab at [facility] Call today  [address] [facility] contact # [phone]    None   None ([phone]) Patient stated that they have no PCP             Disposition:  Home      Total discharge preparation time was > 30  minutes.

## 2018-08-09 NOTE — PROGRESS NOTES
Problem: Discharge Planning  Goal: *Discharge to safe environment  Outcome: Progressing Towards Goal  See CM notes.     Te Jimenez MS

## 2018-08-09 NOTE — PROGRESS NOTES
0730: Bedside shift change report given to Raoul Velazquez (oncoming nurse) by Efrain Ochoa (offgoing nurse). Report included the following information SBAR, Kardex, Intake/Output, MAR, Recent Results and Cardiac Rhythm NSR. Problem: Falls - Risk of  Goal: *Absence of Falls  Document Jillian Fall Risk and appropriate interventions in the flowsheet. Outcome: Progressing Towards Goal  Fall Risk Interventions:            Medication Interventions: Patient to call before getting OOB, Teach patient to arise slowly                  Problem: Cath Lab Procedures: Post-Cath Day 1  Goal: Activity/Safety  Outcome: Progressing Towards Goal  Pt up ab juan r  Goal: Medications  Outcome: Progressing Towards Goal  Pt demonstrates knowledge regarding medications.

## 2019-12-22 ENCOUNTER — HOSPITAL ENCOUNTER (INPATIENT)
Age: 49
LOS: 1 days | Discharge: HOME | DRG: 281 | End: 2019-12-24
Attending: EMERGENCY MEDICINE | Admitting: INTERNAL MEDICINE
Payer: OTHER GOVERNMENT

## 2019-12-22 ENCOUNTER — APPOINTMENT (OUTPATIENT)
Dept: GENERAL RADIOLOGY | Age: 49
DRG: 281 | End: 2019-12-22
Attending: EMERGENCY MEDICINE
Payer: OTHER GOVERNMENT

## 2019-12-22 DIAGNOSIS — I20.0 UNSTABLE ANGINA (HCC): ICD-10-CM

## 2019-12-22 DIAGNOSIS — R07.9 CHEST PAIN, UNSPECIFIED TYPE: Primary | ICD-10-CM

## 2019-12-22 LAB
ALBUMIN SERPL-MCNC: 3.3 G/DL (ref 3.5–5)
ALBUMIN/GLOB SERPL: 0.8 {RATIO} (ref 1.1–2.2)
ALP SERPL-CCNC: 98 U/L (ref 45–117)
ALT SERPL-CCNC: 43 U/L (ref 12–78)
ANION GAP SERPL CALC-SCNC: 8 MMOL/L (ref 5–15)
AST SERPL-CCNC: 23 U/L (ref 15–37)
BASOPHILS # BLD: 0.1 K/UL (ref 0–0.1)
BASOPHILS NFR BLD: 1 % (ref 0–1)
BILIRUB SERPL-MCNC: 0.4 MG/DL (ref 0.2–1)
BUN SERPL-MCNC: 17 MG/DL (ref 6–20)
BUN/CREAT SERPL: 12 (ref 12–20)
CALCIUM SERPL-MCNC: 8.9 MG/DL (ref 8.5–10.1)
CHLORIDE SERPL-SCNC: 102 MMOL/L (ref 97–108)
CO2 SERPL-SCNC: 26 MMOL/L (ref 21–32)
COMMENT, HOLDF: NORMAL
CREAT SERPL-MCNC: 1.45 MG/DL (ref 0.7–1.3)
DIFFERENTIAL METHOD BLD: ABNORMAL
EOSINOPHIL # BLD: 0.2 K/UL (ref 0–0.4)
EOSINOPHIL NFR BLD: 3 % (ref 0–7)
ERYTHROCYTE [DISTWIDTH] IN BLOOD BY AUTOMATED COUNT: 12.9 % (ref 11.5–14.5)
GLOBULIN SER CALC-MCNC: 4.1 G/DL (ref 2–4)
GLUCOSE SERPL-MCNC: 307 MG/DL (ref 65–100)
HCT VFR BLD AUTO: 44.8 % (ref 36.6–50.3)
HGB BLD-MCNC: 15.1 G/DL (ref 12.1–17)
IMM GRANULOCYTES # BLD AUTO: 0 K/UL (ref 0–0.04)
IMM GRANULOCYTES NFR BLD AUTO: 1 % (ref 0–0.5)
LYMPHOCYTES # BLD: 1.9 K/UL (ref 0.8–3.5)
LYMPHOCYTES NFR BLD: 33 % (ref 12–49)
MCH RBC QN AUTO: 32 PG (ref 26–34)
MCHC RBC AUTO-ENTMCNC: 33.7 G/DL (ref 30–36.5)
MCV RBC AUTO: 94.9 FL (ref 80–99)
MONOCYTES # BLD: 0.3 K/UL (ref 0–1)
MONOCYTES NFR BLD: 4 % (ref 5–13)
NEUTS SEG # BLD: 3.3 K/UL (ref 1.8–8)
NEUTS SEG NFR BLD: 58 % (ref 32–75)
NRBC # BLD: 0 K/UL (ref 0–0.01)
NRBC BLD-RTO: 0 PER 100 WBC
PLATELET # BLD AUTO: 203 K/UL (ref 150–400)
PMV BLD AUTO: 10.2 FL (ref 8.9–12.9)
POTASSIUM SERPL-SCNC: 4.4 MMOL/L (ref 3.5–5.1)
PROT SERPL-MCNC: 7.4 G/DL (ref 6.4–8.2)
RBC # BLD AUTO: 4.72 M/UL (ref 4.1–5.7)
SAMPLES BEING HELD,HOLD: NORMAL
SODIUM SERPL-SCNC: 136 MMOL/L (ref 136–145)
TROPONIN I SERPL-MCNC: 0.06 NG/ML
WBC # BLD AUTO: 5.6 K/UL (ref 4.1–11.1)

## 2019-12-22 PROCEDURE — 96375 TX/PRO/DX INJ NEW DRUG ADDON: CPT

## 2019-12-22 PROCEDURE — 93005 ELECTROCARDIOGRAM TRACING: CPT

## 2019-12-22 PROCEDURE — 71046 X-RAY EXAM CHEST 2 VIEWS: CPT

## 2019-12-22 PROCEDURE — 84484 ASSAY OF TROPONIN QUANT: CPT

## 2019-12-22 PROCEDURE — 85025 COMPLETE CBC W/AUTO DIFF WBC: CPT

## 2019-12-22 PROCEDURE — 99285 EMERGENCY DEPT VISIT HI MDM: CPT

## 2019-12-22 PROCEDURE — 36415 COLL VENOUS BLD VENIPUNCTURE: CPT

## 2019-12-22 PROCEDURE — 80053 COMPREHEN METABOLIC PANEL: CPT

## 2019-12-22 PROCEDURE — 96374 THER/PROPH/DIAG INJ IV PUSH: CPT

## 2019-12-22 PROCEDURE — 74011250636 HC RX REV CODE- 250/636: Performed by: EMERGENCY MEDICINE

## 2019-12-22 PROCEDURE — 74011250637 HC RX REV CODE- 250/637: Performed by: EMERGENCY MEDICINE

## 2019-12-22 RX ORDER — ONDANSETRON 2 MG/ML
8 INJECTION INTRAMUSCULAR; INTRAVENOUS
Status: COMPLETED | OUTPATIENT
Start: 2019-12-22 | End: 2019-12-22

## 2019-12-22 RX ORDER — FENTANYL CITRATE 50 UG/ML
50 INJECTION, SOLUTION INTRAMUSCULAR; INTRAVENOUS
Status: COMPLETED | OUTPATIENT
Start: 2019-12-22 | End: 2019-12-22

## 2019-12-22 RX ORDER — NITROGLYCERIN 0.4 MG/1
0.4 TABLET SUBLINGUAL
Status: COMPLETED | OUTPATIENT
Start: 2019-12-22 | End: 2019-12-22

## 2019-12-22 RX ADMIN — SODIUM CHLORIDE 1000 ML: 900 INJECTION, SOLUTION INTRAVENOUS at 23:42

## 2019-12-22 RX ADMIN — NITROGLYCERIN 0.4 MG: 0.4 TABLET, ORALLY DISINTEGRATING SUBLINGUAL at 23:39

## 2019-12-22 RX ADMIN — FENTANYL CITRATE 50 MCG: 50 INJECTION, SOLUTION INTRAMUSCULAR; INTRAVENOUS at 23:42

## 2019-12-22 RX ADMIN — ONDANSETRON 8 MG: 2 INJECTION INTRAMUSCULAR; INTRAVENOUS at 23:42

## 2019-12-22 RX ADMIN — NITROGLYCERIN 0.4 MG: 0.4 TABLET, ORALLY DISINTEGRATING SUBLINGUAL at 22:50

## 2019-12-22 RX ADMIN — NITROGLYCERIN 0.4 MG: 0.4 TABLET, ORALLY DISINTEGRATING SUBLINGUAL at 23:08

## 2019-12-22 NOTE — Clinical Note
Bilateral groin clipped, prepped with ChloraPrep and draped. Wet prep solution applied at: 1454. Wet prep solution dried at: 1458. Wet prep elapsed drying time: 4 mins.

## 2019-12-22 NOTE — Clinical Note
Sheath #1: Dressed using 4 X 4, tape and transparent dressing. Site: clean, dry, & intact, no bleeding and no hematoma.

## 2019-12-23 ENCOUNTER — APPOINTMENT (OUTPATIENT)
Dept: NON INVASIVE DIAGNOSTICS | Age: 49
DRG: 281 | End: 2019-12-23
Attending: INTERNAL MEDICINE
Payer: OTHER GOVERNMENT

## 2019-12-23 LAB
AMPHET UR QL SCN: NEGATIVE
APTT PPP: 28.8 SEC (ref 22.1–32)
APTT PPP: 35.1 SEC (ref 22.1–32)
BARBITURATES UR QL SCN: NEGATIVE
BASOPHILS # BLD: 0.1 K/UL (ref 0–0.1)
BASOPHILS NFR BLD: 1 % (ref 0–1)
BENZODIAZ UR QL: NEGATIVE
BNP SERPL-MCNC: 350 PG/ML
CANNABINOIDS UR QL SCN: NEGATIVE
CHOLEST SERPL-MCNC: 247 MG/DL
COCAINE UR QL SCN: NEGATIVE
D DIMER PPP FEU-MCNC: <0.19 MG/L FEU (ref 0–0.65)
DIFFERENTIAL METHOD BLD: NORMAL
DRUG SCRN COMMENT,DRGCM: NORMAL
EOSINOPHIL # BLD: 0.3 K/UL (ref 0–0.4)
EOSINOPHIL NFR BLD: 4 % (ref 0–7)
ERYTHROCYTE [DISTWIDTH] IN BLOOD BY AUTOMATED COUNT: 13.1 % (ref 11.5–14.5)
GLUCOSE BLD STRIP.AUTO-MCNC: 134 MG/DL (ref 65–100)
GLUCOSE BLD STRIP.AUTO-MCNC: 226 MG/DL (ref 65–100)
GLUCOSE BLD STRIP.AUTO-MCNC: 260 MG/DL (ref 65–100)
GLUCOSE BLD STRIP.AUTO-MCNC: 326 MG/DL (ref 65–100)
HCT VFR BLD AUTO: 40.4 % (ref 36.6–50.3)
HDLC SERPL-MCNC: 28 MG/DL
HDLC SERPL: 8.8 {RATIO} (ref 0–5)
HGB BLD-MCNC: 13.7 G/DL (ref 12.1–17)
IMM GRANULOCYTES # BLD AUTO: 0 K/UL (ref 0–0.04)
IMM GRANULOCYTES NFR BLD AUTO: 0 % (ref 0–0.5)
LDLC SERPL CALC-MCNC: ABNORMAL MG/DL (ref 0–100)
LDLC SERPL DIRECT ASSAY-MCNC: 126 MG/DL (ref 0–100)
LIPASE SERPL-CCNC: 140 U/L (ref 73–393)
LIPID PROFILE,FLP: ABNORMAL
LYMPHOCYTES # BLD: 2.7 K/UL (ref 0.8–3.5)
LYMPHOCYTES NFR BLD: 37 % (ref 12–49)
MAGNESIUM SERPL-MCNC: 1.8 MG/DL (ref 1.6–2.4)
MCH RBC QN AUTO: 32 PG (ref 26–34)
MCHC RBC AUTO-ENTMCNC: 33.9 G/DL (ref 30–36.5)
MCV RBC AUTO: 94.4 FL (ref 80–99)
METHADONE UR QL: NEGATIVE
MONOCYTES # BLD: 0.5 K/UL (ref 0–1)
MONOCYTES NFR BLD: 6 % (ref 5–13)
NEUTS SEG # BLD: 3.8 K/UL (ref 1.8–8)
NEUTS SEG NFR BLD: 52 % (ref 32–75)
NRBC # BLD: 0 K/UL (ref 0–0.01)
NRBC BLD-RTO: 0 PER 100 WBC
OPIATES UR QL: NEGATIVE
PCP UR QL: NEGATIVE
PHOSPHATE SERPL-MCNC: 3.2 MG/DL (ref 2.6–4.7)
PLATELET # BLD AUTO: 215 K/UL (ref 150–400)
PMV BLD AUTO: 10.2 FL (ref 8.9–12.9)
RBC # BLD AUTO: 4.28 M/UL (ref 4.1–5.7)
SERVICE CMNT-IMP: ABNORMAL
THERAPEUTIC RANGE,PTTT: ABNORMAL SECS (ref 58–77)
THERAPEUTIC RANGE,PTTT: NORMAL SECS (ref 58–77)
TRIGL SERPL-MCNC: 636 MG/DL (ref ?–150)
TROPONIN I SERPL-MCNC: 0.09 NG/ML
TSH SERPL DL<=0.05 MIU/L-ACNC: 2.71 UIU/ML (ref 0.36–3.74)
VLDLC SERPL CALC-MCNC: ABNORMAL MG/DL
WBC # BLD AUTO: 7.3 K/UL (ref 4.1–11.1)

## 2019-12-23 PROCEDURE — 77030004538 HC CATH ANGI DX MP BSC -A: Performed by: INTERNAL MEDICINE

## 2019-12-23 PROCEDURE — 65660000000 HC RM CCU STEPDOWN

## 2019-12-23 PROCEDURE — 74011250637 HC RX REV CODE- 250/637: Performed by: EMERGENCY MEDICINE

## 2019-12-23 PROCEDURE — 74011250636 HC RX REV CODE- 250/636: Performed by: EMERGENCY MEDICINE

## 2019-12-23 PROCEDURE — 94640 AIRWAY INHALATION TREATMENT: CPT

## 2019-12-23 PROCEDURE — 74011250637 HC RX REV CODE- 250/637: Performed by: INTERNAL MEDICINE

## 2019-12-23 PROCEDURE — 74011636320 HC RX REV CODE- 636/320: Performed by: INTERNAL MEDICINE

## 2019-12-23 PROCEDURE — 83735 ASSAY OF MAGNESIUM: CPT

## 2019-12-23 PROCEDURE — 74011250636 HC RX REV CODE- 250/636: Performed by: INTERNAL MEDICINE

## 2019-12-23 PROCEDURE — 99152 MOD SED SAME PHYS/QHP 5/>YRS: CPT | Performed by: INTERNAL MEDICINE

## 2019-12-23 PROCEDURE — 36415 COLL VENOUS BLD VENIPUNCTURE: CPT

## 2019-12-23 PROCEDURE — C1894 INTRO/SHEATH, NON-LASER: HCPCS | Performed by: INTERNAL MEDICINE

## 2019-12-23 PROCEDURE — 74011000250 HC RX REV CODE- 250: Performed by: INTERNAL MEDICINE

## 2019-12-23 PROCEDURE — B2131ZZ FLUOROSCOPY OF MULTIPLE CORONARY ARTERY BYPASS GRAFTS USING LOW OSMOLAR CONTRAST: ICD-10-PCS | Performed by: INTERNAL MEDICINE

## 2019-12-23 PROCEDURE — 82962 GLUCOSE BLOOD TEST: CPT

## 2019-12-23 PROCEDURE — 84484 ASSAY OF TROPONIN QUANT: CPT

## 2019-12-23 PROCEDURE — 84100 ASSAY OF PHOSPHORUS: CPT

## 2019-12-23 PROCEDURE — 85379 FIBRIN DEGRADATION QUANT: CPT

## 2019-12-23 PROCEDURE — 84443 ASSAY THYROID STIM HORMONE: CPT

## 2019-12-23 PROCEDURE — C8929 TTE W OR WO FOL WCON,DOPPLER: HCPCS

## 2019-12-23 PROCEDURE — 77030029684 HC NEB SM VOL KT MONA -A

## 2019-12-23 PROCEDURE — B2111ZZ FLUOROSCOPY OF MULTIPLE CORONARY ARTERIES USING LOW OSMOLAR CONTRAST: ICD-10-PCS | Performed by: INTERNAL MEDICINE

## 2019-12-23 PROCEDURE — 80061 LIPID PANEL: CPT

## 2019-12-23 PROCEDURE — C8923 2D TTE W OR W/O FOL W/CON,CO: HCPCS

## 2019-12-23 PROCEDURE — 80307 DRUG TEST PRSMV CHEM ANLYZR: CPT

## 2019-12-23 PROCEDURE — 85730 THROMBOPLASTIN TIME PARTIAL: CPT

## 2019-12-23 PROCEDURE — 4A023N7 MEASUREMENT OF CARDIAC SAMPLING AND PRESSURE, LEFT HEART, PERCUTANEOUS APPROACH: ICD-10-PCS | Performed by: INTERNAL MEDICINE

## 2019-12-23 PROCEDURE — C1769 GUIDE WIRE: HCPCS | Performed by: INTERNAL MEDICINE

## 2019-12-23 PROCEDURE — 83880 ASSAY OF NATRIURETIC PEPTIDE: CPT

## 2019-12-23 PROCEDURE — 74011636637 HC RX REV CODE- 636/637: Performed by: INTERNAL MEDICINE

## 2019-12-23 PROCEDURE — 85025 COMPLETE CBC W/AUTO DIFF WBC: CPT

## 2019-12-23 PROCEDURE — C1760 CLOSURE DEV, VASC: HCPCS | Performed by: INTERNAL MEDICINE

## 2019-12-23 PROCEDURE — 99153 MOD SED SAME PHYS/QHP EA: CPT | Performed by: INTERNAL MEDICINE

## 2019-12-23 PROCEDURE — 77030004532 HC CATH ANGI DX IMP BSC -A: Performed by: INTERNAL MEDICINE

## 2019-12-23 PROCEDURE — 83721 ASSAY OF BLOOD LIPOPROTEIN: CPT

## 2019-12-23 PROCEDURE — 83690 ASSAY OF LIPASE: CPT

## 2019-12-23 PROCEDURE — 93005 ELECTROCARDIOGRAM TRACING: CPT

## 2019-12-23 PROCEDURE — 93459 L HRT ART/GRFT ANGIO: CPT | Performed by: INTERNAL MEDICINE

## 2019-12-23 RX ORDER — HEPARIN SODIUM 5000 [USP'U]/ML
4000 INJECTION, SOLUTION INTRAVENOUS; SUBCUTANEOUS ONCE
Status: COMPLETED | OUTPATIENT
Start: 2019-12-23 | End: 2019-12-23

## 2019-12-23 RX ORDER — ONDANSETRON 2 MG/ML
4 INJECTION INTRAMUSCULAR; INTRAVENOUS
Status: DISCONTINUED | OUTPATIENT
Start: 2019-12-23 | End: 2019-12-24 | Stop reason: HOSPADM

## 2019-12-23 RX ORDER — GUAIFENESIN 100 MG/5ML
81 LIQUID (ML) ORAL DAILY
Status: DISCONTINUED | OUTPATIENT
Start: 2019-12-23 | End: 2019-12-24 | Stop reason: HOSPADM

## 2019-12-23 RX ORDER — GABAPENTIN 600 MG/1
600 TABLET ORAL 3 TIMES DAILY
Status: DISCONTINUED | OUTPATIENT
Start: 2019-12-23 | End: 2019-12-24 | Stop reason: HOSPADM

## 2019-12-23 RX ORDER — CALCIUM CARBONATE 200(500)MG
400 TABLET,CHEWABLE ORAL AS NEEDED
Status: DISCONTINUED | OUTPATIENT
Start: 2019-12-23 | End: 2019-12-24 | Stop reason: HOSPADM

## 2019-12-23 RX ORDER — MAG HYDROX/ALUMINUM HYD/SIMETH 200-200-20
30 SUSPENSION, ORAL (FINAL DOSE FORM) ORAL
Status: DISCONTINUED | OUTPATIENT
Start: 2019-12-23 | End: 2019-12-24 | Stop reason: HOSPADM

## 2019-12-23 RX ORDER — LIDOCAINE HYDROCHLORIDE 10 MG/ML
INJECTION INFILTRATION; PERINEURAL AS NEEDED
Status: DISCONTINUED | OUTPATIENT
Start: 2019-12-23 | End: 2019-12-23 | Stop reason: HOSPADM

## 2019-12-23 RX ORDER — FENTANYL CITRATE 50 UG/ML
INJECTION, SOLUTION INTRAMUSCULAR; INTRAVENOUS AS NEEDED
Status: DISCONTINUED | OUTPATIENT
Start: 2019-12-23 | End: 2019-12-23 | Stop reason: HOSPADM

## 2019-12-23 RX ORDER — FOLIC ACID 1 MG/1
1 TABLET ORAL DAILY
Status: DISCONTINUED | OUTPATIENT
Start: 2019-12-23 | End: 2019-12-24 | Stop reason: HOSPADM

## 2019-12-23 RX ORDER — SODIUM CHLORIDE 0.9 % (FLUSH) 0.9 %
5-40 SYRINGE (ML) INJECTION AS NEEDED
Status: DISCONTINUED | OUTPATIENT
Start: 2019-12-23 | End: 2019-12-24 | Stop reason: HOSPADM

## 2019-12-23 RX ORDER — INSULIN GLARGINE 100 [IU]/ML
22 INJECTION, SOLUTION SUBCUTANEOUS DAILY
Status: DISCONTINUED | OUTPATIENT
Start: 2019-12-23 | End: 2019-12-24 | Stop reason: HOSPADM

## 2019-12-23 RX ORDER — HEPARIN SODIUM 10000 [USP'U]/100ML
10-25 INJECTION, SOLUTION INTRAVENOUS
Status: DISCONTINUED | OUTPATIENT
Start: 2019-12-23 | End: 2019-12-23

## 2019-12-23 RX ORDER — MIDAZOLAM HYDROCHLORIDE 1 MG/ML
INJECTION, SOLUTION INTRAMUSCULAR; INTRAVENOUS AS NEEDED
Status: DISCONTINUED | OUTPATIENT
Start: 2019-12-23 | End: 2019-12-23 | Stop reason: HOSPADM

## 2019-12-23 RX ORDER — INSULIN LISPRO 100 [IU]/ML
INJECTION, SOLUTION INTRAVENOUS; SUBCUTANEOUS
Status: DISCONTINUED | OUTPATIENT
Start: 2019-12-23 | End: 2019-12-24 | Stop reason: HOSPADM

## 2019-12-23 RX ORDER — ROSUVASTATIN CALCIUM 40 MG/1
40 TABLET, COATED ORAL DAILY
Status: DISCONTINUED | OUTPATIENT
Start: 2019-12-23 | End: 2019-12-24 | Stop reason: HOSPADM

## 2019-12-23 RX ORDER — LISINOPRIL 20 MG/1
20 TABLET ORAL DAILY
Status: DISCONTINUED | OUTPATIENT
Start: 2019-12-23 | End: 2019-12-24 | Stop reason: HOSPADM

## 2019-12-23 RX ORDER — DEXTROSE MONOHYDRATE 100 MG/ML
0-250 INJECTION, SOLUTION INTRAVENOUS AS NEEDED
Status: DISCONTINUED | OUTPATIENT
Start: 2019-12-23 | End: 2019-12-24 | Stop reason: HOSPADM

## 2019-12-23 RX ORDER — SODIUM CHLORIDE 0.9 % (FLUSH) 0.9 %
5-40 SYRINGE (ML) INJECTION EVERY 8 HOURS
Status: DISCONTINUED | OUTPATIENT
Start: 2019-12-23 | End: 2019-12-24 | Stop reason: HOSPADM

## 2019-12-23 RX ORDER — BISACODYL 5 MG
5 TABLET, DELAYED RELEASE (ENTERIC COATED) ORAL DAILY PRN
Status: DISCONTINUED | OUTPATIENT
Start: 2019-12-23 | End: 2019-12-24 | Stop reason: HOSPADM

## 2019-12-23 RX ORDER — CARVEDILOL 12.5 MG/1
25 TABLET ORAL 2 TIMES DAILY WITH MEALS
Status: DISCONTINUED | OUTPATIENT
Start: 2019-12-23 | End: 2019-12-24 | Stop reason: HOSPADM

## 2019-12-23 RX ORDER — SERTRALINE HYDROCHLORIDE 50 MG/1
100 TABLET, FILM COATED ORAL DAILY
Status: DISCONTINUED | OUTPATIENT
Start: 2019-12-23 | End: 2019-12-24 | Stop reason: HOSPADM

## 2019-12-23 RX ORDER — HEPARIN SODIUM 1000 [USP'U]/ML
4000 INJECTION, SOLUTION INTRAVENOUS; SUBCUTANEOUS ONCE
Status: COMPLETED | OUTPATIENT
Start: 2019-12-23 | End: 2019-12-23

## 2019-12-23 RX ORDER — IPRATROPIUM BROMIDE AND ALBUTEROL SULFATE 2.5; .5 MG/3ML; MG/3ML
3 SOLUTION RESPIRATORY (INHALATION)
Status: DISCONTINUED | OUTPATIENT
Start: 2019-12-23 | End: 2019-12-24 | Stop reason: HOSPADM

## 2019-12-23 RX ORDER — MAGNESIUM SULFATE 100 %
4 CRYSTALS MISCELLANEOUS AS NEEDED
Status: DISCONTINUED | OUTPATIENT
Start: 2019-12-23 | End: 2019-12-24 | Stop reason: HOSPADM

## 2019-12-23 RX ORDER — FENTANYL CITRATE 50 UG/ML
50 INJECTION, SOLUTION INTRAMUSCULAR; INTRAVENOUS
Status: DISCONTINUED | OUTPATIENT
Start: 2019-12-23 | End: 2019-12-24 | Stop reason: HOSPADM

## 2019-12-23 RX ORDER — NITROGLYCERIN 0.4 MG/1
0.4 TABLET SUBLINGUAL
Status: DISCONTINUED | OUTPATIENT
Start: 2019-12-23 | End: 2019-12-24 | Stop reason: HOSPADM

## 2019-12-23 RX ADMIN — INSULIN LISPRO 3 UNITS: 100 INJECTION, SOLUTION INTRAVENOUS; SUBCUTANEOUS at 06:58

## 2019-12-23 RX ADMIN — Medication 10 ML: at 16:22

## 2019-12-23 RX ADMIN — GABAPENTIN 600 MG: 600 TABLET, FILM COATED ORAL at 21:07

## 2019-12-23 RX ADMIN — FOLIC ACID 1 MG: 1 TABLET ORAL at 09:14

## 2019-12-23 RX ADMIN — CARVEDILOL 25 MG: 12.5 TABLET, FILM COATED ORAL at 09:14

## 2019-12-23 RX ADMIN — Medication 1 CAPSULE: at 09:14

## 2019-12-23 RX ADMIN — FENTANYL CITRATE 50 MCG: 50 INJECTION INTRAMUSCULAR; INTRAVENOUS at 20:05

## 2019-12-23 RX ADMIN — NITROGLYCERIN 1 INCH: 20 OINTMENT TOPICAL at 02:20

## 2019-12-23 RX ADMIN — INSULIN LISPRO 5 UNITS: 100 INJECTION, SOLUTION INTRAVENOUS; SUBCUTANEOUS at 12:52

## 2019-12-23 RX ADMIN — SERTRALINE HYDROCHLORIDE 100 MG: 50 TABLET ORAL at 09:13

## 2019-12-23 RX ADMIN — HEPARIN SODIUM AND DEXTROSE 10 UNITS/KG/HR: 10000; 5 INJECTION INTRAVENOUS at 02:26

## 2019-12-23 RX ADMIN — FENTANYL CITRATE 50 MCG: 50 INJECTION INTRAMUSCULAR; INTRAVENOUS at 12:52

## 2019-12-23 RX ADMIN — ROSUVASTATIN CALCIUM 40 MG: 40 TABLET, FILM COATED ORAL at 09:14

## 2019-12-23 RX ADMIN — LISINOPRIL 20 MG: 20 TABLET ORAL at 09:14

## 2019-12-23 RX ADMIN — TICAGRELOR 90 MG: 90 TABLET ORAL at 17:55

## 2019-12-23 RX ADMIN — CARVEDILOL 25 MG: 12.5 TABLET, FILM COATED ORAL at 17:55

## 2019-12-23 RX ADMIN — INSULIN GLARGINE 22 UNITS: 100 INJECTION, SOLUTION SUBCUTANEOUS at 09:12

## 2019-12-23 RX ADMIN — IPRATROPIUM BROMIDE AND ALBUTEROL SULFATE 3 ML: .5; 3 SOLUTION RESPIRATORY (INHALATION) at 10:15

## 2019-12-23 RX ADMIN — NITROGLYCERIN 1 INCH: 20 OINTMENT TOPICAL at 11:39

## 2019-12-23 RX ADMIN — SODIUM CHLORIDE 1.5 ML: 9 INJECTION INTRAMUSCULAR; INTRAVENOUS; SUBCUTANEOUS at 17:12

## 2019-12-23 RX ADMIN — Medication 10 ML: at 21:07

## 2019-12-23 RX ADMIN — TICAGRELOR 90 MG: 90 TABLET ORAL at 09:14

## 2019-12-23 RX ADMIN — FENTANYL CITRATE 50 MCG: 50 INJECTION INTRAMUSCULAR; INTRAVENOUS at 04:10

## 2019-12-23 RX ADMIN — HEPARIN SODIUM 4000 UNITS: 5000 INJECTION INTRAVENOUS; SUBCUTANEOUS at 02:23

## 2019-12-23 RX ADMIN — INSULIN LISPRO 4 UNITS: 100 INJECTION, SOLUTION INTRAVENOUS; SUBCUTANEOUS at 21:55

## 2019-12-23 RX ADMIN — FENTANYL CITRATE 50 MCG: 50 INJECTION INTRAMUSCULAR; INTRAVENOUS at 08:22

## 2019-12-23 RX ADMIN — GABAPENTIN 600 MG: 600 TABLET, FILM COATED ORAL at 09:14

## 2019-12-23 RX ADMIN — HEPARIN SODIUM 4000 UNITS: 1000 INJECTION INTRAVENOUS; SUBCUTANEOUS at 10:35

## 2019-12-23 RX ADMIN — Medication 10 ML: at 06:59

## 2019-12-23 RX ADMIN — ASPIRIN 81 MG 81 MG: 81 TABLET ORAL at 09:13

## 2019-12-23 RX ADMIN — Medication 10 ML: at 14:00

## 2019-12-23 RX ADMIN — GABAPENTIN 600 MG: 600 TABLET, FILM COATED ORAL at 17:55

## 2019-12-23 NOTE — PROGRESS NOTES
Assumed pt care for this patient admitted for CP ( NSTEMI). VSS; pain better controlled with prn fentanyl with effective but non lasting results. Heparin infusing @ current rate of 10units/kg/hr. PTT sent @ 0800  Cardiology consulted. Educated pt on smoking cessation. Pt  understands the need to stop but at this time is not willing to try. Advised pt to discuss with team regarding having a nicotine patch while in hospital.    Primary Nurse Cristóbal Hammer RN and Ray Pierre RN performed a dual skin assessment on this patient No impairment noted  Marquis score is 23      Bedside shift change report given to Maggie White RN (oncoming nurse) by Pamela Heard RN (offgoing nurse). Report included the following information SBAR, Kardex, Procedure Summary, Intake/Output, MAR, Recent Results, Med Rec Status and Cardiac Rhythm SR. Problem: Falls - Risk of  Goal: *Absence of Falls  Description  Document Brendan Nugent Fall Risk and appropriate interventions in the flowsheet.   12/23/2019 0904 by Shirley Tony RN  Outcome: Progressing Towards Goal  Note: Fall Risk Interventions:       Medication Interventions: Evaluate medications/consider consulting pharmacy, Patient to call before getting OOB      12/23/2019 0904 by Shirley Tony RN  Outcome: Progressing Towards Goal  Note: Fall Risk Interventions:         Medication Interventions: Evaluate medications/consider consulting pharmacy, Patient to call before getting OOB        Problem: Patient Education: Go to Patient Education Activity  Goal: Patient/Family Education  Outcome: Progressing Towards Goal     Problem: Patient Education: Go to Patient Education Activity  Goal: Patient/Family Education  Outcome: Progressing Towards Goal     Problem: Unstable angina/NSTEMI: Day of Admission/Day 1  Goal: Activity/Safety  Outcome: Progressing Towards Goal  Goal: Consults, if ordered  Outcome: Progressing Towards Goal  Goal: Diagnostic Test/Procedures  Outcome: Progressing Towards Goal  Goal: Nutrition/Diet  Outcome: Progressing Towards Goal  Goal: Medications  Outcome: Progressing Towards Goal  Goal: Respiratory  Outcome: Progressing Towards Goal  Goal: Treatments/Interventions/Procedures  Outcome: Progressing Towards Goal  Goal: Psychosocial  Outcome: Progressing Towards Goal  Goal: *Hemodynamically stable  Outcome: Progressing Towards Goal  Goal: *Optimal pain control at patient's stated goal  Outcome: Progressing Towards Goal  Goal: *Lungs clear or at baseline  Outcome: Progressing Towards Goal     Problem: Patient Education: Go to Patient Education Activity  Goal: Patient/Family Education  Outcome: Progressing Towards Goal

## 2019-12-23 NOTE — H&P
[address]  HISTORY AND PHYSICAL    Name:  Donnell Feliciano  MR#:  [id number]  :  1970  ACCOUNT #:  [de-identified]  ADMIT DATE:  2019      The patient was seen, evaluated and admitted by me on 2019. PRIMARY CARE PHYSICIAN:  Unknown. SOURCE OF INFORMATION:  Patient. CHIEF COMPLAINT:  Chest pain. HISTORY OF PRESENT ILLNESS:  This is a 71-year-old man with past medical history significant for CABG and multiple stent placement, type 2 diabetes, hypertension, and dyslipidemia, who was in his usual state of health until about three days ago when the patient developed chest pain. The chest pain is 8/10 in severity, described as pressure-like on the left side of the chest with radiation to the jaw and left upper extremity, constant. No known aggravating or relieving factors. Associated with diaphoresis. The patient came to the emergency room because of worsening symptoms. He was last admitted to this hospital from 2018 to 2018. The patient was admitted with chest pain, underwent cardiac catheterization with stent placement, and he was discharged to home in stable condition. When the patient arrived at the emergency room, the patient was found to have elevated troponin level. The repeat troponin level was even higher than the first one. The emergency room physician consulted the patient's Cardiology Group and admission to the hospital was advised. The patient was started on continuous infusion of heparin and nitroglycerin as advised by the cardiologist.  The patient was subsequently referred to the hospitalist service for evaluation for admission. No history of fever, no rigors, no chills. The chest pain is associated with shortness of breath. PAST MEDICAL HISTORY:  1. Coronary artery disease, status post CABG and multiple stent placement. 2.  Type 2 diabetes. 3.  Hypertension. 4.  Dyslipidemia.     ALLERGIES:  THE PATIENT IS ALLERGIC TO TRAMADOL. MEDICATIONS:  1. Albuterol 90 mcg 2 puffs by inhalation every 4 hours as needed for wheezing. 2.  Mylanta 30 mL three times daily as needed. 3.  Aspirin 81 mg daily. 4.  Coreg 25 mg twice daily. 5.  Folic acid 1 mg daily. 6.  Neurontin 600 mg three times daily. 7.  Motrin 400 mg every 6 hours as needed for pain. 8.  Lantus insulin 22 units subcutaneously daily. 9.  Lisinopril 20 mg daily. 10.  Nitrostat 0.4 mg sublingual every 5 minutes as needed for chest pain. 11.  Fish oil 1 capsule daily. 12.  Crestor 40 mg daily. 13.  Zoloft 100 mg daily . 14. Brilinta 90 mg twice daily. FAMILY HISTORY:  This was reviewed. His father had stroke. PAST MEDICAL HISTORY:  This is significant for CABG and multiple stent placement. SOCIAL HISTORY:  The patient is a former smoker. The patient denies alcohol abuse but admits to use of marijuana, previous history of cocaine and heroin abuse. REVIEW OF SYSTEMS:  HEAD, EYES, EARS, NOSE, AND THROAT:  This is positive for dizziness and headache. No blurring of vision, no photophobia. RESPIRATORY SYSTEM:  This is positive for shortness of breath and cough. No hemoptysis. CARDIOVASCULAR SYSTEM:  This is positive for chest pain. No orthopnea, no palpitations. GASTROINTESTINAL SYSTEM:  No nausea or vomiting. No diarrhea, no constipation. GENITOURINARY SYSTEM:  No dysuria, no urgency, no frequency. All other systems are reviewed and they are negative. PHYSICAL EXAMINATION:  GENERAL APPEARANCE:  The patient appeared ill, in moderate distress. VITAL SIGNS:  On arrival at the emergency room; temperature 98.7, pulse 95, respiratory rate 16, blood pressure 130/90, oxygen saturation 96% on room air. HEENT:  Head:  Normocephalic, atraumatic. Eyes:  Normal eye movement. No redness, no drainage, no discharge. Ears:  Normal external ears with no evidence of drainage. Nose:  No deformity, no drainage.   Mouth and Throat:  No visible oral lesion. NECK:  Neck is supple. No JVD, no thyromegaly. CHEST:  Clear breath sounds. No wheezing, no crackles. HEART:  Normal S1 and S2, regular. No clinically appreciable murmur. ABDOMEN:  Soft, nontender. Normal bowel sounds. CNS:  Alert and oriented x3. No gross focal neurological deficit. EXTREMITIES:  No edema. Pulses 2+ bilaterally. MUSCULOSKELETAL SYSTEM:  No obvious joint deformity or swelling. SKIN:  No active skin lesions seen in the exposed part of the body. PSYCHIATRY:  Normal mood and affect. LYMPHATIC SYSTEM:  No cervical lymphadenopathy. DIAGNOSTIC DATA:  EKG shows normal sinus rhythm and nonspecific ST and T-waves abnormalities. Chest x-ray, no acute disease. LABORATORY DATA:  Hematology:  WBC 5.6, hemoglobin 15.1, hematocrit 44.8, platelets of 280. Chemistry:  Sodium 136, potassium 4.4, chloride 102, CO2 of 26, glucose 307, BUN 17, creatinine 1.45, calcium 8.9, total bilirubin 0.4, ALT 43, AST 23, alkaline phosphatase 98, total protein 7.4, albumin level 3.3, globulin 4.1. Cardiac profile:  Troponin 0.06, repeat troponin level 0.09.    ASSESSMENT:  1.  Non-ST elevation myocardial infarction. 2.  Type 2 diabetes with hyperglycemia. 3.  Hypertension. 4.  Dyslipidemia. PLAN:  1.  Non-ST elevation myocardial infarction: We will admit the patient for further evaluation and treatment. We will continue continuous infusion of heparin started in the emergency room as advised by the cardiologist.  This is the most likely cause of the patient's chest pain. The patient will be evaluated for other atypical causes of chest pain. We will check a lipase level. We will check a D-dimer to evaluate the patient for thromboembolism. We will also check urine drug screen. We will await further recommendation from the cardiologist.  2.  Type 2 diabetes with hyperglycemia: We will place the patient on sliding scale with insulin coverage.   We will check hemoglobin A1c level if one has not been checked recently. 3.  Hypertension: We will resume home medication and monitor the patient's blood pressure closely. We will also check a TSH level. 4.  Dyslipidemia: We will continue with preadmission medication. We will check lipid profile. OTHER ISSUES:  Code status: The patient is a full code. The patient is on full-dose heparin, because of that, there is no need for DVT prophylaxis. FUNCTIONAL STATUS PRIOR TO ADMISSION:  The patient was ambulatory without assistant or device.         Davida Nava MD      [email or web address]  D:  12/23/2019 7:06  T:  12/23/2019 8:38  JOB #:  [id number]

## 2019-12-23 NOTE — CARDIO/PULMONARY
Cardiac Rehab: MI education folder, with catheterization brochure, to bedside of Krista Alexis.   
                                           
Educated using teach back method. Reviewed MI diagnosis definition and purpose of intervention. Discussed risk factors for CAD to include the following: family history, elevated BMI, hyperlipidemia, hypertension, diabetes, stress, and smoking. Smoking Cessation Program link added to AVS. Discussed Heart Healthy/Low Sodium (2000 mg) diet. Reviewed the importance of medication compliance, the purpose of COREG, and potential side effects. Discussed follow up appointments with cardiologist, signs and symptoms of angina, and what to report to physician after discharge. Emphasized the value of cardiac rehab. Discussed Cardiac Rehab Program format, benefits, and encouraged enrollment to assist with risk modification and management. Krista Alexis is uninsured but can enroll with the [place] so contact information for [facility] is on his AVS. 
 
 
 
Krista Alexis verbalized understanding with questions answered.   Toni Ho RN

## 2019-12-23 NOTE — PROGRESS NOTES
TRANSFER - IN REPORT:    Verbal report received from VIVEK Juan on Sherrill Flow, Procedure Cath procedure  , from the Cardiac Cath lab, for routine progression of care. Report consisted of patients Situation, Background, Assessment and Recommendations(SBAR). Information from the following report(s) Procedure Summary, MAR and Recent Results was reviewed with the receiving clinician. Opportunity for questions and clarification was provided. Assessment completed upon patients arrival to [address] [place] [address] and care assumed. Cardiac Cath Lab Recovery Arrival Note:     Sherrill Flow arrived to [facility] recovery area. Patient procedure= Cath procedure with no intervention at this time. Patient on cardiac monitor, non-invasive blood pressure, Patient status doing well without problems. Patient is A&Ox 4. Patient reports no pain, no chest pain, no n/v. Procedure site without any bleeding and no hematoma.

## 2019-12-23 NOTE — PROGRESS NOTES
Reason for Admission:  Admitted from home with complaints of chest pain. He is several months post CABG. RRAT Score:     5                Plan for utilizing home health:     Reports being self care in all ADLs. Current Advanced Directive/Advance Care Plan:  Does not have and he declines having one completed at this time. Transition of Care Plan:   Cardiac consult    [facility] today  Heparin gtt and Echo  Anticipate that he will discharge to home when medically stable.     Care Management Interventions  PCP Verified by CM: Yes(Green Team at the [place])  Palliative Care Criteria Met (RRAT>21 & CHF Dx)?: No  Mode of Transport at Discharge: (private car)  Current Support Network: (lives with friend)  Confirm Follow Up Transport: Self  The Plan for Transition of Care is Related to the Following Treatment Goals : (resolution of symptoms such as chest pain )  The Patient and/or Patient Representative was Provided with a Choice of Provider and Agrees with the Discharge Plan?: Yes(Diann Hargrove [phone])  [address] Provided?: Yes  Discharge Location  Discharge Placement: [address] [place] [address]  Ext [phone]

## 2019-12-23 NOTE — CONSULTS
CARDIOLOGY CONSULT                  Subjective:    Date of  Admission: 12/22/2019 10:09 PM     Admission type:Emergency    Shaylee Chapman is a 51 y.o. male admitted for NSTEMI (non-ST elevated myocardial infarction) ([facility] [I21.4]. He has CAD s/p CABG and has had LHC with PCI at multiple facilities. He receives his OP cardiac care at the [facility]. He presentes with his index angina which is a pressure sensation with jaw and LUE radiation. He mentions received a recent PCI at [facility] last month but no records of this could be found. He describes what sounds like triple therapy with Xarelto but he is not aware of AF and cant describe why he would be on an anticoagulant (may be on low dose due to CAD and concurrent PAD)  He presented with TWI on EKG and having chest pain. Pain improved with NTG SL but his troponin had increased from its previous values. HE was NPO.     Patient Active Problem List    Diagnosis Date Noted    Dehydration 08/08/2018    Acute chest pain 08/08/2018    Type 2 diabetes mellitus with hyperglycemia ([facility] 08/08/2018    NSTEMI (non-ST elevated myocardial infarction) ([facility] 03/18/2017    Acute non Q wave MI (myocardial infarction), initial episode of care [facility]) 02/23/2012    Chest pain at rest 02/22/2012    CAD (coronary artery disease) 02/22/2012    HTN (hypertension) 02/22/2012    Hyperlipidemia 02/22/2012    Unstable angina ([facility] 02/22/2012      None  Past Medical History:   Diagnosis Date    CAD (coronary artery disease)     Diabetes ([facility]     \"prediabetes\" Was suppos to go clinic at  [place]    Endocarditis     History of cardiac cath     Hypertension     MI (myocardial infarction) [facility])       Past Surgical History:   Procedure Laterality Date    CARDIAC SURG PROCEDURE UNLIST      cath with stent placement feb 2012    CARDIAC SURG PROCEDURE UNLIST      CABGx4     Allergies   Allergen Reactions    Tramadol Itching     Pt reports he is not allergic to this      Family History   Problem Relation Age of Onset    Stroke Father       Current Facility-Administered Medications   Medication Dose Route Frequency    heparin 25,000 units in D5W 250 ml infusion  10-25 Units/kg/hr IntraVENous TITRATE    nitroglycerin (NITROSTAT) tablet 0.4 mg  0.4 mg SubLINGual Q5MIN PRN    fentaNYL citrate (PF) injection 50 mcg  50 mcg IntraVENous Q4H PRN    albuterol-ipratropium (DUO-NEB) 2.5 MG-0.5 MG/3 ML  3 mL Nebulization Q4H PRN    alum-mag hydroxide-simeth (MYLANTA) oral suspension 30 mL  30 mL Oral TID PRN    aspirin chewable tablet 81 mg  81 mg Oral DAILY    calcium carbonate (TUMS) chewable tablet 400 mg [elemental]  400 mg Oral PRN    carvedilol (COREG) tablet 25 mg  25 mg Oral BID WITH MEALS    folic acid (FOLVITE) tablet 1 mg  1 mg Oral DAILY    gabapentin (NEURONTIN) tablet 600 mg  600 mg Oral TID    insulin glargine (LANTUS) injection 22 Units  22 Units SubCUTAneous DAILY    lisinopril (PRINIVIL, ZESTRIL) tablet 20 mg  20 mg Oral DAILY    fish oil-omega-3 fatty acids 340-1,000 mg capsule 1 Cap  1 Cap Oral DAILY    rosuvastatin (CRESTOR) tablet 40 mg  40 mg Oral DAILY    sertraline (ZOLOFT) tablet 100 mg  100 mg Oral DAILY    ticagrelor (BRILINTA) tablet 90 mg  90 mg Oral BID    sodium chloride (NS) flush 5-40 mL  5-40 mL IntraVENous Q8H    sodium chloride (NS) flush 5-40 mL  5-40 mL IntraVENous PRN    ondansetron (ZOFRAN) injection 4 mg  4 mg IntraVENous Q4H PRN    bisacodyl (DULCOLAX) tablet 5 mg  5 mg Oral DAILY PRN    insulin lispro (HUMALOG) injection   SubCUTAneous AC&HS    glucose chewable tablet 16 g  4 Tab Oral PRN    glucagon (GLUCAGEN) injection 1 mg  1 mg IntraMUSCular PRN    dextrose 10% infusion 0-250 mL  0-250 mL IntraVENous PRN         Review of Symptoms:  Gen - no F/C/S  Eyes - no vision changes  ENT - no sore throat, rhinorrhea, otalgia  CV - + CP, no palpitations, no orthopnea, no PND, no EMIGDIO  Resp no cough, + SOB/TORRES  GI - no AP, no n/v/d/c   - no dysuria, no hematuria  MSK - no abnormal joint pains  Skin - no rashes  Neuro - no HA, no numbness, no weakness, no slurred speech  Psych - no change in mood         Physical Exam    Visit Vitals  BP (!) 150/93 (BP 1 Location: Right arm, BP Patient Position: At rest)   Pulse 84   Temp 97.6 °F (36.4 °C)   Resp 13   Ht 5' 8\" (1.727 m)   Wt 97.9 kg (215 lb 12.8 oz)   SpO2 98%   BMI 32.81 kg/m²     NAD  Skin warm and dry  Nl conjunctiva  Oropharynx without exudate.     Neck supple  Lungs clear  Normal S1/ S2 with occasional ectopy  No Murmurs, click or Rubs  Abdomen soft and non tender  Pulses 2+ radials  Neuro:  Grossly intact  Appropriate      Cardiographics    Telemetry: normal sinus rhythm  Labs:   Recent Results (from the past 24 hour(s))   EKG, 12 LEAD, INITIAL    Collection Time: 12/22/19  9:48 PM   Result Value Ref Range    Ventricular Rate 90 BPM    Atrial Rate 90 BPM    P-R Interval 150 ms    QRS Duration 100 ms    Q-T Interval 396 ms    QTC Calculation (Bezet) 484 ms    Calculated P Axis 52 degrees    Calculated R Axis 88 degrees    Calculated T Axis -34 degrees    Diagnosis       Normal sinus rhythm  Inferior infarct (cited on or before 18-MAR-2017)  When compared with ECG of 08-AUG-2018 09:04,  No significant change was found     TROPONIN I    Collection Time: 12/22/19  9:49 PM   Result Value Ref Range    Troponin-I, Qt. 0.06 (H) <0.05 ng/mL   CBC WITH AUTOMATED DIFF    Collection Time: 12/22/19  9:49 PM   Result Value Ref Range    WBC 5.6 4.1 - 11.1 K/uL    RBC 4.72 4.10 - 5.70 M/uL    HGB 15.1 12.1 - 17.0 g/dL    HCT 44.8 36.6 - 50.3 %    MCV 94.9 80.0 - 99.0 FL    MCH 32.0 26.0 - 34.0 PG    MCHC 33.7 30.0 - 36.5 g/dL    RDW 12.9 11.5 - 14.5 %    PLATELET 540 514 - 505 K/uL    MPV 10.2 8.9 - 12.9 FL    NRBC 0.0 0  WBC    ABSOLUTE NRBC 0.00 0.00 - 0.01 K/uL    NEUTROPHILS 58 32 - 75 %    LYMPHOCYTES 33 12 - 49 %    MONOCYTES 4 (L) 5 - 13 %    EOSINOPHILS 3 0 - 7 %    BASOPHILS 1 0 - 1 %    IMMATURE GRANULOCYTES 1 (H) 0.0 - 0.5 %    ABS. NEUTROPHILS 3.3 1.8 - 8.0 K/UL    ABS. LYMPHOCYTES 1.9 0.8 - 3.5 K/UL    ABS. MONOCYTES 0.3 0.0 - 1.0 K/UL    ABS. EOSINOPHILS 0.2 0.0 - 0.4 K/UL    ABS. BASOPHILS 0.1 0.0 - 0.1 K/UL    ABS. IMM. GRANS. 0.0 0.00 - 0.04 K/UL    DF AUTOMATED     METABOLIC PANEL, COMPREHENSIVE    Collection Time: 12/22/19  9:49 PM   Result Value Ref Range    Sodium 136 136 - 145 mmol/L    Potassium 4.4 3.5 - 5.1 mmol/L    Chloride 102 97 - 108 mmol/L    CO2 26 21 - 32 mmol/L    Anion gap 8 5 - 15 mmol/L    Glucose 307 (H) 65 - 100 mg/dL    BUN 17 6 - 20 MG/DL    Creatinine 1.45 (H) 0.70 - 1.30 MG/DL    BUN/Creatinine ratio 12 12 - 20      GFR est AA >60 >60 ml/min/1.73m2    GFR est non-AA 52 (L) >60 ml/min/1.73m2    Calcium 8.9 8.5 - 10.1 MG/DL    Bilirubin, total 0.4 0.2 - 1.0 MG/DL    ALT (SGPT) 43 12 - 78 U/L    AST (SGOT) 23 15 - 37 U/L    Alk. phosphatase 98 45 - 117 U/L    Protein, total 7.4 6.4 - 8.2 g/dL    Albumin 3.3 (L) 3.5 - 5.0 g/dL    Globulin 4.1 (H) 2.0 - 4.0 g/dL    A-G Ratio 0.8 (L) 1.1 - 2.2     SAMPLES BEING HELD    Collection Time: 12/22/19  9:49 PM   Result Value Ref Range    SAMPLES BEING HELD 1blue 1red     COMMENT        Add-on orders for these samples will be processed based on acceptable specimen integrity and analyte stability, which may vary by analyte.    EKG, 12 LEAD, INITIAL    Collection Time: 12/22/19 11:47 PM   Result Value Ref Range    Ventricular Rate 93 BPM    Atrial Rate 93 BPM    P-R Interval 148 ms    QRS Duration 94 ms    Q-T Interval 392 ms    QTC Calculation (Bezet) 487 ms    Calculated P Axis 54 degrees    Calculated R Axis 77 degrees    Calculated T Axis 84 degrees    Diagnosis       Normal sinus rhythm  Inferior infarct , age undetermined  When compared with ECG of 22-DEC-2019 21:48,  MANUAL COMPARISON REQUIRED, DATA IS UNCONFIRMED     TROPONIN I    Collection Time: 12/23/19 12:08 AM   Result Value Ref Range    Troponin-I, Qt. 0.09 (H) <0.05 ng/mL   EKG, 12 LEAD, INITIAL    Collection Time: 12/23/19  1:07 AM   Result Value Ref Range    Ventricular Rate 88 BPM    Atrial Rate 88 BPM    P-R Interval 156 ms    QRS Duration 104 ms    Q-T Interval 388 ms    QTC Calculation (Bezet) 469 ms    Calculated P Axis 57 degrees    Calculated R Axis 79 degrees    Calculated T Axis -172 degrees    Diagnosis       Normal sinus rhythm  Inferior infarct , age undetermined  When compared with ECG of 22-DEC-2019 23:47,  MANUAL COMPARISON REQUIRED, DATA IS UNCONFIRMED     PTT    Collection Time: 12/23/19  1:47 AM   Result Value Ref Range    aPTT 28.8 22.1 - 32.0 sec    aPTT, therapeutic range     58.0 - 77.0 SECS   CBC WITH AUTOMATED DIFF    Collection Time: 12/23/19  2:07 AM   Result Value Ref Range    WBC 7.3 4.1 - 11.1 K/uL    RBC 4.28 4.10 - 5.70 M/uL    HGB 13.7 12.1 - 17.0 g/dL    HCT 40.4 36.6 - 50.3 %    MCV 94.4 80.0 - 99.0 FL    MCH 32.0 26.0 - 34.0 PG    MCHC 33.9 30.0 - 36.5 g/dL    RDW 13.1 11.5 - 14.5 %    PLATELET 507 248 - 979 K/uL    MPV 10.2 8.9 - 12.9 FL    NRBC 0.0 0  WBC    ABSOLUTE NRBC 0.00 0.00 - 0.01 K/uL    NEUTROPHILS 52 32 - 75 %    LYMPHOCYTES 37 12 - 49 %    MONOCYTES 6 5 - 13 %    EOSINOPHILS 4 0 - 7 %    BASOPHILS 1 0 - 1 %    IMMATURE GRANULOCYTES 0 0.0 - 0.5 %    ABS. NEUTROPHILS 3.8 1.8 - 8.0 K/UL    ABS. LYMPHOCYTES 2.7 0.8 - 3.5 K/UL    ABS. MONOCYTES 0.5 0.0 - 1.0 K/UL    ABS. EOSINOPHILS 0.3 0.0 - 0.4 K/UL    ABS. BASOPHILS 0.1 0.0 - 0.1 K/UL    ABS. IMM. GRANS. 0.0 0.00 - 0.04 K/UL    DF AUTOMATED     LIPID PANEL    Collection Time: 12/23/19  4:16 AM   Result Value Ref Range    LIPID PROFILE          Cholesterol, total 247 (H) <200 MG/DL    Triglyceride 636 (H) <150 MG/DL    HDL Cholesterol 28 MG/DL    LDL, calculated Not calculated due to elevated triglyceride level 0 - 100 MG/DL    VLDL, calculated  MG/DL     Calculation not valid with this patient's other Lipid values.     CHOL/HDL Ratio 8.8 (H) 0.0 - 5.0     TSH 3RD GENERATION    Collection Time: 12/23/19  4:16 AM   Result Value Ref Range    TSH 2.71 0.36 - 3.74 uIU/mL   MAGNESIUM    Collection Time: 12/23/19  4:16 AM   Result Value Ref Range    Magnesium 1.8 1.6 - 2.4 mg/dL   PHOSPHORUS    Collection Time: 12/23/19  4:16 AM   Result Value Ref Range    Phosphorus 3.2 2.6 - 4.7 MG/DL   D DIMER    Collection Time: 12/23/19  4:16 AM   Result Value Ref Range    D-dimer <0.19 0.00 - 0.65 mg/L FEU   LIPASE    Collection Time: 12/23/19  4:16 AM   Result Value Ref Range    Lipase 140 73 - 393 U/L   NT-PRO BNP    Collection Time: 12/23/19  4:16 AM   Result Value Ref Range    NT pro- (H) <125 PG/ML   LDL, DIRECT    Collection Time: 12/23/19  4:16 AM   Result Value Ref Range    LDL,Direct 126 (H) 0 - 100 mg/dl   GLUCOSE, POC    Collection Time: 12/23/19  6:37 AM   Result Value Ref Range    Glucose (POC) 226 (H) 65 - 100 mg/dL    Performed by Catherine Gibbons   PCT         Assessment and Plan:     This is a 52 yo mwith CAD s/p CABG here with index angian and NSTEMI.    NPO for LHC today, took ASA  Started heparin as unclear as to circumstances surrounding Xarelto  Cont other cardiac meds  Trend troponins  Echo    Thank you for this consult, care has been turned over to Dr. Alicia Ann:

## 2019-12-23 NOTE — ROUTINE PROCESS
TRANSFER - OUT REPORT: 
 
Verbal report given to [facility] VIVEK Allen(name) on Burton Morton  being transferred to [address] [place] [address](unit) for routine progression of care Report consisted of patients Situation, Background, Assessment and  
Recommendations(SBAR). Information from the following report(s) SBAR, ED Summary, Intake/Output, MAR, Recent Results and Cardiac Rhythm NSR was reviewed with the receiving nurse. Lines:  
Peripheral IV 12/22/19 Left Antecubital (Active) Site Assessment Clean, dry, & intact 12/23/2019  2:20 AM  
Phlebitis Assessment 0 12/23/2019  2:20 AM  
Infiltration Assessment 0 12/23/2019  2:20 AM  
Dressing Status Clean, dry, & intact 12/23/2019  2:20 AM  
Dressing Type Transparent;Tape 12/23/2019  2:20 AM  
Hub Color/Line Status Patent; Flushed 12/23/2019  2:20 AM  
Action Taken Blood drawn 12/22/2019  9:53 PM  
   
Peripheral IV 12/23/19 Right;Posterior Forearm (Active) Site Assessment Clean, dry, & intact 12/23/2019  2:19 AM  
Phlebitis Assessment 0 12/23/2019  2:19 AM  
Infiltration Assessment 0 12/23/2019  2:19 AM  
Dressing Status Clean, dry, & intact 12/23/2019  2:19 AM  
Dressing Type Transparent;Tape 12/23/2019  2:19 AM  
Hub Color/Line Status Green;Patent; Flushed 12/23/2019  2:19 AM  
Action Taken Blood drawn 12/23/2019  2:19 AM  
  
 
Opportunity for questions and clarification was provided. Patient transported with: 
 Monitor Registered Nurse

## 2019-12-23 NOTE — PROGRESS NOTES
TRANSFER - IN REPORT:    Verbal report received from [place] on Carmen Chung  being received from procedure for routine progression of care. Report consisted of patients Situation, Background, Assessment and Recommendations(SBAR). Information from the following report(s) SBAR and Procedure Summary was reviewed with the receiving clinician. Opportunity for questions and clarification was provided. Assessment completed upon patients arrival to [address] [place] [address]. Cardiac Cath Lab Recovery Arrival Note:    Carmen Chung arrived to [facility] recovery area. Patient procedure= cardiac catheterization. Patient on cardiac monitor, non-invasive blood pressure, SPO2 monitor. On O2 @ 4 lpm via nasal cannula. IV  of 0.9% normal saline on pump at  ml/hr. Patient status doing well without problems. Patient is A&Ox 4. Patient reports no complaints. PROCEDURE SITE CHECK:    Procedure site:without any bleeding or hematoma, no pain/discomfort reported at procedure site. No change in patient status. Continue to monitor patient and status.

## 2019-12-23 NOTE — ED NOTES
0000 Verbal shift change report given to Heather Mcnamara RN (oncoming nurse) by Fernando Kelly and Jose Sebastian RN (offgoing nurse). Report included the following information SBAR, ED Summary, Intake/Output, MAR and Recent Results. 0005 This nurse at bedside to collect repeat Troponin. Pt resting comfortably, talkative. VSS. Call bell within reach. Paz Victor.    1839 Pt calling out for water. Will ask Dr. Lion Jim. 2885 Dr. Lion Jim states pt can have water. Requesting new EKG if pain is continued. 0055 Pt reporting 8/10 pain. Will repeat EKG.    0100 Pt resting on ED stretcher, continues to c/o pain. VSS. Call bell within reach. Yojana Odom This nurse at bedside to perform EKG. 0200 Dr. Ravindra Mendoza at bedside. Pt resting on ED stretcher, continues to c/o pain. Will start another line, administer nitro paste, heparin bolus, and heparin drip as ordered. Pt not due for another dose of fentanyl until 0345, pt aware. VSS. Call bell within reach. WCTM.

## 2019-12-23 NOTE — PROGRESS NOTES
Hospitalist Progress Note                               Rene Cruz MD                                     Answering service: [phone]                               OR [phone] from in house phone                                         Date of Service:  2019  NAME:  Lenoard Najjar  :  1970  MRN:  [id number]      Admission Summary:   50 y/o gentleman with a past medical history significant for CAD, CABG and multiple stent placement, type 2 diabetes, primary  hypertension, and dyslipidemia, was in his usual state of health until about three days prior  when the patient developed chest pain. The chest pain was  8/10 in severity, described as pressure-like on the left side of the chest with radiation to the jaw and left upper extremity, constant. No known aggravating or relieving factors. Associated with diaphoresis. The patient came to the emergency room because of worsening symptoms. Patient  was last admitted to this hospital from 2018 to 2018. The patient was admitted with chest pain, underwent cardiac catheterization with stent placement, and he was discharged to home in stable condition  When the patient arrived at the emergency room, the patient was found to have elevated troponin level. The repeat troponin level was even higher than the first one. The emergency room physician consulted the patient's Cardiology Group and admission to the hospital was advised. The patient was started on continuous infusion of heparin and nitroglycerin as advised by the cardiologist.      Reason for follow up:       CC: Chest pain. All events since admission reviewed in their entirety. Patient denied any uncontrolled pain or SOB. Assessment & Plan:     1) CVS: Probable acute NSTEMI and unstable angina. CAD. Primary hypertension. Dyslipidemia. 2DECHO () with normal systolic CHF and EF 08-47 percent.  Continue Aspirin, statin, B-blocker, nitrates and Heparin drip. Cardiology eval noted and appreciated. For possible LHC. 2) Endocrine: Type 2 DM. Accucheck monitoring. Accucheck monitoring,Sliding scale insulin, diabetic teaching. 3) Prophylaxis: DVT. 4) Directives: Full Code. D/W patient. 5) Plan: Anticipate D/C to home in 1-2 days. D/W patient and nursing. Hospital Problems  Date Reviewed: 12/23/2019          Codes Class Noted POA    * (Principal) NSTEMI (non-ST elevated myocardial infarction) [facility]) ICD-10-CM: I21.4  ICD-9-CM: 410.70  3/18/2017 Yes                  Physical Examination:      Last 24hrs VS reviewed since prior progress note. Most recent are:  Visit Vitals  /79 (BP 1 Location: Right arm, BP Patient Position: At rest)   Pulse 77   Temp 98.3 °F (36.8 °C)   Resp 12   Ht 5' 8\" (1.727 m)   Wt 97.9 kg (215 lb 12.8 oz)   SpO2 97%   BMI 32.81 kg/m²           Constitutional:  No acute distress, cooperative, pleasant    HEENT: Head is a traumatic,  Un icteric sclera. Pink conjunctiva,no erythema or discharge. Oral mucous moist, oropharynx benign. Neck supple,    Resp:  CTA bilaterally. No wheezing/rhonchi/rales. No accessory muscle use   CV:  S1,S2 present. GI:  Soft, non distended, non tender. normoactive bowel sounds, no hepatosplenomegaly    :  No CVA or suprapubic tenderness   Skin  :  No erythema,rash,bullae,dipigmentation     Musculoskeletal:  Bipedal edema. Neurologic:  AAOx3, CN II-XII reviewed. Moves all extremities.               Intake/Output Summary (Last 24 hours) at 12/23/2019 1230  Last data filed at 12/23/2019 0530  Gross per 24 hour   Intake 1000 ml   Output 200 ml   Net 800 ml              Labs:     Recent Labs     12/23/19  0207 12/22/19 2149   WBC 7.3 5.6   HGB 13.7 15.1   HCT 40.4 44.8    203     Recent Labs     12/23/19  0416 12/22/19  2149   NA  --  136   K  --  4.4   CL  --  102   CO2  --  26   BUN  --  17   CREA  --  1.45*   GLU  --  307*   CA  --  8.9   MG 1.8  --    PHOS 3.2  --      Recent Labs     12/23/19  0416 12/22/19 2149   SGOT  --  23   ALT  --  43   AP  --  98   TBILI  --  0.4   TP  --  7.4   ALB  --  3.3*   GLOB  --  4.1*   LPSE 140  --      Recent Labs     12/23/19  0829 12/23/19  0147   APTT 35.1* 28.8      No results for input(s): FE, TIBC, PSAT, FERR in the last 72 hours. No results found for: FOL, RBCF   No results for input(s): PH, PCO2, PO2 in the last 72 hours.   Recent Labs     12/23/19  0008 12/22/19 2149   TROIQ 0.09* 0.06*     Lab Results   Component Value Date/Time    Cholesterol, total 247 (H) 12/23/2019 04:16 AM    HDL Cholesterol 28 12/23/2019 04:16 AM    LDL,Direct 126 (H) 12/23/2019 04:16 AM    LDL, calculated Not calculated due to elevated triglyceride level 12/23/2019 04:16 AM    Triglyceride 636 (H) 12/23/2019 04:16 AM    CHOL/HDL Ratio 8.8 (H) 12/23/2019 04:16 AM     Lab Results   Component Value Date/Time    Glucose (POC) 260 (H) 12/23/2019 12:18 PM    Glucose (POC) 226 (H) 12/23/2019 06:37 AM    Glucose (POC) 303 (H) 08/09/2018 11:14 AM    Glucose (POC) 217 (H) 08/09/2018 06:29 AM    Glucose (POC) 282 (H) 08/08/2018 04:26 PM     Lab Results   Component Value Date/Time    Color YELLOW/STRAW 08/08/2018 01:13 AM    Appearance CLEAR 08/08/2018 01:13 AM    Specific gravity 1.023 08/08/2018 01:13 AM    Specific gravity 1.015 10/15/2014 05:38 PM    pH (UA) 6.0 08/08/2018 01:13 AM    Protein 100 (A) 08/08/2018 01:13 AM    Glucose >1,000 (A) 08/08/2018 01:13 AM    Ketone NEGATIVE  08/08/2018 01:13 AM    Bilirubin NEGATIVE  08/08/2018 01:13 AM    Urobilinogen 0.2 08/08/2018 01:13 AM    Nitrites NEGATIVE  08/08/2018 01:13 AM    Leukocyte Esterase NEGATIVE  08/08/2018 01:13 AM    Epithelial cells FEW 08/08/2018 01:13 AM    Bacteria NEGATIVE  08/08/2018 01:13 AM    WBC 0-4 08/08/2018 01:13 AM    RBC 0-5 08/08/2018 01:13 AM         Medications Reviewed:     Current Facility-Administered Medications   Medication Dose Route Frequency    heparin 25,000 units in D5W 250 ml infusion  10-25 Units/kg/hr IntraVENous TITRATE    nitroglycerin (NITROSTAT) tablet 0.4 mg  0.4 mg SubLINGual Q5MIN PRN    fentaNYL citrate (PF) injection 50 mcg  50 mcg IntraVENous Q4H PRN    albuterol-ipratropium (DUO-NEB) 2.5 MG-0.5 MG/3 ML  3 mL Nebulization Q4H PRN    alum-mag hydroxide-simeth (MYLANTA) oral suspension 30 mL  30 mL Oral TID PRN    aspirin chewable tablet 81 mg  81 mg Oral DAILY    calcium carbonate (TUMS) chewable tablet 400 mg [elemental]  400 mg Oral PRN    carvedilol (COREG) tablet 25 mg  25 mg Oral BID WITH MEALS    folic acid (FOLVITE) tablet 1 mg  1 mg Oral DAILY    gabapentin (NEURONTIN) tablet 600 mg  600 mg Oral TID    insulin glargine (LANTUS) injection 22 Units  22 Units SubCUTAneous DAILY    lisinopril (PRINIVIL, ZESTRIL) tablet 20 mg  20 mg Oral DAILY    fish oil-omega-3 fatty acids 340-1,000 mg capsule 1 Cap  1 Cap Oral DAILY    rosuvastatin (CRESTOR) tablet 40 mg  40 mg Oral DAILY    sertraline (ZOLOFT) tablet 100 mg  100 mg Oral DAILY    ticagrelor (BRILINTA) tablet 90 mg  90 mg Oral BID    sodium chloride (NS) flush 5-40 mL  5-40 mL IntraVENous Q8H    sodium chloride (NS) flush 5-40 mL  5-40 mL IntraVENous PRN    ondansetron (ZOFRAN) injection 4 mg  4 mg IntraVENous Q4H PRN    bisacodyl (DULCOLAX) tablet 5 mg  5 mg Oral DAILY PRN    insulin lispro (HUMALOG) injection   SubCUTAneous AC&HS    glucose chewable tablet 16 g  4 Tab Oral PRN    glucagon (GLUCAGEN) injection 1 mg  1 mg IntraMUSCular PRN    dextrose 10% infusion 0-250 mL  0-250 mL IntraVENous PRN     ______________________________________________________________________  EXPECTED LENGTH OF STAY: - - -  ACTUAL LENGTH OF STAY:          0                 Penny Johnson MD

## 2019-12-23 NOTE — ED TRIAGE NOTES
Patient arrives to the ED with c/o upper left side chest pain and SOB x 3 nights, states CP radiates toward his neck, describes CP as sharp, no nausea or vomiting noted.

## 2019-12-23 NOTE — PROGRESS NOTES
Problem: Discharge Planning  Goal: *Discharge to safe environment  Outcome: Progressing Towards Goal  Note:   Anticipate discharge to home once medically stable. Will need PCP and cardiac follow up.

## 2019-12-23 NOTE — ED PROVIDER NOTES
HPI     50-year-old male with a history of coronary artery disease status post bypass surgery and multiple stents most recently in August, presents to the ED with 3 nights of chest pain with associated shortness of breath. He states the pain is currently an 8 out of 10 and he describes it as a pressure. He reports shortness of breath but no nausea. He reports dizziness. He also reports sweats. He states the pain radiates to his jaw and shoulder. The current episode of chest pain started at 8 PM.  He denies fever. He does have a cough and congestion with some wheezing. He has not been traveling he has no history of DVT he reports slight increase in leg swelling. He is on Xarelto and Plavix. He ran out of his sublingual nitro 1 week ago. He did take an aspirin tonight he does still smoke. His symptoms are similar to prior cardiac events. He states his doctor gave him a small white pill that is the equivalent of nitroglycerin that he takes a half a tablet a day. Today he took a full tablet.     Past Medical History:   Diagnosis Date    CAD (coronary artery disease)     Diabetes ([address]     \"prediabetes\" Was suppos to go clinic at  [facility]     History of cardiac cath     Hypertension     [facility]) [address] [address]        Past Surgical History:   Procedure Laterality Date    CARDIAC SURG PROCEDURE UNLIST      cath with stent placement feb 2012    CARDIAC SURG PROCEDURE UNLIST      CABGx4         Family History:   Problem Relation Age of Onset    Stroke Father        Social History     Socioeconomic History    Marital status: SINGLE     Spouse name: Not on file    Number of children: Not on file    Years of education: Not on file    Highest education level: Not on file   Occupational History    Not on file   Social Needs    Financial resource strain: Not on file    Food insecurity:     Worry: Not on file     Inability: Not on file    Transportation needs:     Medical: Not on file     Non-medical: Not on file   Tobacco Use    Smoking status: Former Smoker     Last attempt to quit: 2012     Years since quittin.9    Smokeless tobacco: Never Used   Substance and Sexual Activity    Alcohol use: Yes     Comment: occassionl    Drug use: No     Comment: quit ti about 2 weeks ago/ prev use cocaine, heroin IV    Sexual activity: Not Currently     Partners: Female   Lifestyle    Physical activity:     Days per week: Not on file     Minutes per session: Not on file    Stress: Not on file   Relationships    Social connections:     Talks on phone: Not on file     Gets together: Not on file     Attends [employer] service: Not on file     Active member of club or organization: Not on file     Attends meetings of clubs or organizations: Not on file     Relationship status: Not on file    Intimate partner violence:     Fear of current or ex partner: Not on file     Emotionally abused: Not on file     Physically abused: Not on file     Forced sexual activity: Not on file   Other Topics Concern    Not on file   Social History Narrative    Not on file         ALLERGIES: Tramadol    Review of Systems   Constitutional: Negative for fever. HENT: Positive for congestion. Respiratory: Positive for cough, chest tightness and shortness of breath. Cardiovascular: Positive for chest pain and leg swelling. Negative for palpitations. Gastrointestinal: Negative for abdominal pain, nausea and vomiting. Genitourinary: Negative for dysuria. Musculoskeletal: Negative for gait problem. Skin: Negative for rash. Neurological: Positive for dizziness and headaches. Psychiatric/Behavioral: Negative for dysphoric mood.        Vitals:    19 2139 19 2217   BP: 130/90 130/75   Pulse: 95    Resp: 16 17   Temp: 98.7 °F (37.1 °C)    SpO2: 96% 95%   Weight: 95.5 kg (210 lb 8 oz)    Height: 5' 8\" (1.727 m)             Physical Exam  Constitutional:       General: He is not in acute distress. Appearance: He is well-developed. HENT:      Head: Normocephalic and atraumatic. Mouth/Throat:      Pharynx: No oropharyngeal exudate. Eyes:      General: No scleral icterus. Right eye: No discharge. Left eye: No discharge. Pupils: Pupils are equal, round, and reactive to light. Neck:      Musculoskeletal: Normal range of motion and neck supple. Vascular: No JVD. Cardiovascular:      Rate and Rhythm: Normal rate and regular rhythm. Heart sounds: Normal heart sounds. No murmur. Pulmonary:      Effort: Pulmonary effort is normal. No respiratory distress. Breath sounds: Normal breath sounds. No stridor. No wheezing or rales. Chest:      Chest wall: No tenderness. Abdominal:      General: Bowel sounds are normal. There is no distension. Palpations: Abdomen is soft. There is no mass. Tenderness: There is no tenderness. There is no guarding or rebound. Musculoskeletal: Normal range of motion. Skin:     General: Skin is warm and dry. Capillary Refill: Capillary refill takes less than 2 seconds. Findings: No rash. Neurological:      Mental Status: He is oriented to person, place, and time. Psychiatric:         Behavior: Behavior normal.         Thought Content: Thought content normal.         Judgment: Judgment normal.          MDM  Number of Diagnoses or Management Options  Chest pain, unspecified type:   Unstable angina [facility]):   Critical Care  Total time providing critical care: 30-74 minutes  40 minutes       Procedures      ED EKG interpretation:  Rhythm: normal sinus rhythm; and regular . Rate (approx.): 90; Axis: normal; P wave: normal; QRS interval: normal ; ST/T wave: non-specific changes;  upgoing T waves in 1 and AVL which is changed. This EKG was interpreted by Emely Coreas MD,ED Provider. ED EKG interpretation:  Rhythm: normal sinus rhythm; and regular .  Rate (approx.): 88; Axis: normal; P wave: normal; QRS interval: normal ; ST/T wave: non-specific changes; This EKG was interpreted by Evelina Sevilla MD,ED Provider. Spoke with Dr. Eufemia Luke, cardiology. Waxing and waning pain. Admit to hospitalist.  heparin gtt, nitro paste. Hospitalist Perfect Serve for Admission  1:21 AM    ED Room Number: ER20/20  Patient Name and age:  Petr Goetz 51 y.o.  male  Working Diagnosis:   1. Chest pain, unspecified type    2. Unstable angina ([address]      Readmission: no  Isolation Requirements:  no  Recommended Level of Care:  telemetry  Code Status:  Full Code  Department:[facility] Adult ED - 21   Other:  51year old male with extensive cardiac history, CABG, stents last in Oct. Present with typical anginal pain. troponin's 0.06 and 0.09. Dr. Eufemia Luke, cards on board. Starting heparin.

## 2019-12-24 VITALS
HEART RATE: 76 BPM | HEIGHT: 68 IN | TEMPERATURE: 97.2 F | BODY MASS INDEX: 32.01 KG/M2 | DIASTOLIC BLOOD PRESSURE: 84 MMHG | SYSTOLIC BLOOD PRESSURE: 143 MMHG | OXYGEN SATURATION: 97 % | WEIGHT: 211.2 LBS | RESPIRATION RATE: 20 BRPM

## 2019-12-24 LAB
ALBUMIN SERPL-MCNC: 3.4 G/DL (ref 3.5–5)
ALBUMIN/GLOB SERPL: 0.8 {RATIO} (ref 1.1–2.2)
ALP SERPL-CCNC: 105 U/L (ref 45–117)
ALT SERPL-CCNC: 48 U/L (ref 12–78)
ANION GAP SERPL CALC-SCNC: 8 MMOL/L (ref 5–15)
AST SERPL-CCNC: 42 U/L (ref 15–37)
AV VELOCITY RATIO: 0.8
BASOPHILS # BLD: 0.1 K/UL (ref 0–0.1)
BASOPHILS NFR BLD: 1 % (ref 0–1)
BILIRUB SERPL-MCNC: 0.5 MG/DL (ref 0.2–1)
BUN SERPL-MCNC: 18 MG/DL (ref 6–20)
BUN/CREAT SERPL: 16 (ref 12–20)
CALCIUM SERPL-MCNC: 9.4 MG/DL (ref 8.5–10.1)
CHLORIDE SERPL-SCNC: 102 MMOL/L (ref 97–108)
CO2 SERPL-SCNC: 19 MMOL/L (ref 21–32)
CREAT SERPL-MCNC: 1.16 MG/DL (ref 0.7–1.3)
DIFFERENTIAL METHOD BLD: NORMAL
ECHO AO ROOT DIAM: 3.49 CM
ECHO AV AREA PEAK VELOCITY: 0.7 CM2
ECHO AV PEAK GRADIENT: 4.8 MMHG
ECHO AV PEAK VELOCITY: 109.44 CM/S
ECHO LA MAJOR AXIS: 4.99 CM
ECHO LA TO AORTIC ROOT RATIO: 1.43
ECHO LV E' LATERAL VELOCITY: 9.26 CM/S
ECHO LV E' SEPTAL VELOCITY: 5.83 CM/S
ECHO LV INTERNAL DIMENSION DIASTOLIC: 5.39 CM (ref 4.2–5.9)
ECHO LV INTERNAL DIMENSION SYSTOLIC: 4 CM
ECHO LV IVSD: 1.19 CM (ref 0.6–1)
ECHO LV MASS 2D: 319.4 G (ref 88–224)
ECHO LV MASS INDEX 2D: 152.7 G/M2 (ref 49–115)
ECHO LV POSTERIOR WALL DIASTOLIC: 1.23 CM (ref 0.6–1)
ECHO LVOT DIAM: 1.08 CM
ECHO LVOT PEAK GRADIENT: 3 MMHG
ECHO LVOT PEAK VELOCITY: 87.05 CM/S
ECHO MV A VELOCITY: 72.61 CM/S
ECHO MV AREA PHT: 5.3 CM2
ECHO MV E DECELERATION TIME (DT): 142.7 MS
ECHO MV E VELOCITY: 104.19 CM/S
ECHO MV E/A RATIO: 1.43
ECHO MV E/E' LATERAL: 11.25
ECHO MV E/E' RATIO (AVERAGED): 14.56
ECHO MV E/E' SEPTAL: 17.87
ECHO MV PRESSURE HALF TIME (PHT): 41.4 MS
ECHO MV REGURGITANT RADIUS PISA: 0.62 CM
ECHO PV MAX VELOCITY: 91.87 CM/S
ECHO PV PEAK GRADIENT: 3.4 MMHG
ECHO TV REGURGITANT MAX VELOCITY: 312.19 CM/S
ECHO TV REGURGITANT PEAK GRADIENT: 39 MMHG
EOSINOPHIL # BLD: 0.3 K/UL (ref 0–0.4)
EOSINOPHIL NFR BLD: 3 % (ref 0–7)
ERYTHROCYTE [DISTWIDTH] IN BLOOD BY AUTOMATED COUNT: 13.2 % (ref 11.5–14.5)
EST. AVERAGE GLUCOSE BLD GHB EST-MCNC: 243 MG/DL
GLOBULIN SER CALC-MCNC: 4.3 G/DL (ref 2–4)
GLUCOSE BLD STRIP.AUTO-MCNC: 215 MG/DL (ref 65–100)
GLUCOSE BLD STRIP.AUTO-MCNC: 250 MG/DL (ref 65–100)
GLUCOSE SERPL-MCNC: 236 MG/DL (ref 65–100)
HBA1C MFR BLD: 10.1 % (ref 4–5.6)
HCT VFR BLD AUTO: 44.9 % (ref 36.6–50.3)
HGB BLD-MCNC: 14.7 G/DL (ref 12.1–17)
IMM GRANULOCYTES # BLD AUTO: 0 K/UL (ref 0–0.04)
IMM GRANULOCYTES NFR BLD AUTO: 0 % (ref 0–0.5)
LVFS 2D: 25.81 %
LYMPHOCYTES # BLD: 2.2 K/UL (ref 0.8–3.5)
LYMPHOCYTES NFR BLD: 22 % (ref 12–49)
MAGNESIUM SERPL-MCNC: 1.9 MG/DL (ref 1.6–2.4)
MAGNESIUM SERPL-MCNC: NORMAL MG/DL (ref 1.6–2.4)
MCH RBC QN AUTO: 32 PG (ref 26–34)
MCHC RBC AUTO-ENTMCNC: 32.7 G/DL (ref 30–36.5)
MCV RBC AUTO: 97.8 FL (ref 80–99)
MONOCYTES # BLD: 0.5 K/UL (ref 0–1)
MONOCYTES NFR BLD: 5 % (ref 5–13)
MV DEC SLOPE: 7.3
NEUTS SEG # BLD: 6.7 K/UL (ref 1.8–8)
NEUTS SEG NFR BLD: 69 % (ref 32–75)
NRBC # BLD: 0 K/UL (ref 0–0.01)
NRBC BLD-RTO: 0 PER 100 WBC
PHOSPHATE SERPL-MCNC: 3.1 MG/DL (ref 2.6–4.7)
PLATELET # BLD AUTO: 223 K/UL (ref 150–400)
PMV BLD AUTO: 11 FL (ref 8.9–12.9)
POTASSIUM SERPL-SCNC: 5.3 MMOL/L (ref 3.5–5.1)
PROT SERPL-MCNC: 7.7 G/DL (ref 6.4–8.2)
RBC # BLD AUTO: 4.59 M/UL (ref 4.1–5.7)
RBC MORPH BLD: NORMAL
SERVICE CMNT-IMP: ABNORMAL
SERVICE CMNT-IMP: ABNORMAL
SODIUM SERPL-SCNC: 129 MMOL/L (ref 136–145)
WBC # BLD AUTO: 9.8 K/UL (ref 4.1–11.1)

## 2019-12-24 PROCEDURE — 74011250637 HC RX REV CODE- 250/637: Performed by: INTERNAL MEDICINE

## 2019-12-24 PROCEDURE — 74011250636 HC RX REV CODE- 250/636: Performed by: INTERNAL MEDICINE

## 2019-12-24 PROCEDURE — 80053 COMPREHEN METABOLIC PANEL: CPT

## 2019-12-24 PROCEDURE — 36415 COLL VENOUS BLD VENIPUNCTURE: CPT

## 2019-12-24 PROCEDURE — 74011636637 HC RX REV CODE- 636/637: Performed by: INTERNAL MEDICINE

## 2019-12-24 PROCEDURE — 84100 ASSAY OF PHOSPHORUS: CPT

## 2019-12-24 PROCEDURE — 83735 ASSAY OF MAGNESIUM: CPT

## 2019-12-24 PROCEDURE — 85025 COMPLETE CBC W/AUTO DIFF WBC: CPT

## 2019-12-24 PROCEDURE — 83036 HEMOGLOBIN GLYCOSYLATED A1C: CPT

## 2019-12-24 PROCEDURE — 82962 GLUCOSE BLOOD TEST: CPT

## 2019-12-24 PROCEDURE — 77010033678 HC OXYGEN DAILY

## 2019-12-24 RX ORDER — NITROGLYCERIN 0.4 MG/1
0.4 TABLET SUBLINGUAL
Qty: 20 TAB | Refills: 0 | Status: ON HOLD | OUTPATIENT
Start: 2019-12-24 | End: 2020-03-13

## 2019-12-24 RX ORDER — NITROGLYCERIN 0.4 MG/1
0.4 TABLET SUBLINGUAL
Status: DISCONTINUED | OUTPATIENT
Start: 2019-12-24 | End: 2019-12-24 | Stop reason: HOSPADM

## 2019-12-24 RX ORDER — HYDROCODONE BITARTRATE AND ACETAMINOPHEN 5; 325 MG/1; MG/1
1 TABLET ORAL
Qty: 6 TAB | Refills: 0 | Status: SHIPPED | OUTPATIENT
Start: 2019-12-24 | End: 2019-12-27

## 2019-12-24 RX ADMIN — LISINOPRIL 20 MG: 20 TABLET ORAL at 08:47

## 2019-12-24 RX ADMIN — Medication 10 ML: at 14:00

## 2019-12-24 RX ADMIN — Medication 10 ML: at 07:05

## 2019-12-24 RX ADMIN — FOLIC ACID 1 MG: 1 TABLET ORAL at 08:55

## 2019-12-24 RX ADMIN — CARVEDILOL 25 MG: 12.5 TABLET, FILM COATED ORAL at 08:47

## 2019-12-24 RX ADMIN — FENTANYL CITRATE 50 MCG: 50 INJECTION INTRAMUSCULAR; INTRAVENOUS at 08:47

## 2019-12-24 RX ADMIN — Medication 1 CAPSULE: at 08:47

## 2019-12-24 RX ADMIN — INSULIN GLARGINE 22 UNITS: 100 INJECTION, SOLUTION SUBCUTANEOUS at 08:55

## 2019-12-24 RX ADMIN — TICAGRELOR 90 MG: 90 TABLET ORAL at 08:46

## 2019-12-24 RX ADMIN — ASPIRIN 81 MG 81 MG: 81 TABLET ORAL at 08:47

## 2019-12-24 RX ADMIN — GABAPENTIN 600 MG: 600 TABLET, FILM COATED ORAL at 08:47

## 2019-12-24 RX ADMIN — FENTANYL CITRATE 50 MCG: 50 INJECTION INTRAMUSCULAR; INTRAVENOUS at 04:33

## 2019-12-24 RX ADMIN — SERTRALINE HYDROCHLORIDE 100 MG: 50 TABLET ORAL at 08:55

## 2019-12-24 RX ADMIN — ROSUVASTATIN CALCIUM 40 MG: 40 TABLET, FILM COATED ORAL at 08:47

## 2019-12-24 RX ADMIN — FENTANYL CITRATE 50 MCG: 50 INJECTION INTRAMUSCULAR; INTRAVENOUS at 12:56

## 2019-12-24 RX ADMIN — INSULIN LISPRO 5 UNITS: 100 INJECTION, SOLUTION INTRAVENOUS; SUBCUTANEOUS at 11:56

## 2019-12-24 RX ADMIN — INSULIN LISPRO 3 UNITS: 100 INJECTION, SOLUTION INTRAVENOUS; SUBCUTANEOUS at 07:04

## 2019-12-24 RX ADMIN — FENTANYL CITRATE 50 MCG: 50 INJECTION INTRAMUSCULAR; INTRAVENOUS at 00:16

## 2019-12-24 NOTE — DISCHARGE INSTRUCTIONS
Patient to follow a Heart Healthy diet  Patient to do daily activities as tolerated. Patient to take all medications as prescribed. Patient to follow up with PCP in 1-2 weeks. Patient to follow up with Cardiology at [facility] in 1 week. Patient to return to the ER if any recurrence of symptoms. Smoking Cessation Program: This is a free, phone/text/email based, smoking cessation program. The program is individualized to meet each patient's needs. To enroll use the link - [employer]. com/quit or text Valentin Jaeger to [phone] from any smart phone.

## 2019-12-24 NOTE — PROGRESS NOTES
Problem: Cath Lab Procedures: Pre-Procedure  Goal: Diagnostic Test/Procedures  Outcome: Progressing Towards Goal  Note:   Pt on bedrest until 1900, pulses palpable, no sign of hematoma, small amount of bloody drainage on dressing. Problem: Unstable angina/NSTEMI: Day of Admission/Day 1  Goal: Medications  Note:   Pt getting IV fentanyl Q4 PRN for chest pain, Dr. Rogelio Ansari and Dr. Chavarria Rank aware. Orders received to continue fentanyl for continued chest pain post cardiac cath. Will continue to monitor. Bedside shift change report given to Jonathan Silvestre (oncoming nurse) by Julieta Parks (offgoing nurse).  Report included the following information SBAR, Kardex, Procedure Summary, Intake/Output, MAR, Recent Results, and Cardiac Rhythm SR .

## 2019-12-24 NOTE — PROGRESS NOTES
Problem: Falls - Risk of  Goal: *Absence of Falls  Description  Document [facility] Fall Risk and appropriate interventions in the flowsheet. Outcome: Progressing Towards Goal  Note: Fall Risk Interventions:            Medication Interventions: Evaluate medications/consider consulting pharmacy, Patient to call before getting OOB, Teach patient to arise slowly                   Problem: Unstable angina/NSTEMI: Day of Admission/Day 1  Goal: Activity/Safety  Outcome: Progressing Towards Goal  Goal: Consults, if ordered  Outcome: Progressing Towards Goal  Note:   Patient had a C 12/23,cardiology following,still having chest pain about 8,on iv fentanyl for pain. PRN nitro. progressing towards goal.sob with exertion.   Goal: Diagnostic Test/Procedures  Outcome: Progressing Towards Goal     Problem: Cath Lab Procedures: Pre-Procedure  Goal: Medications  Outcome: Progressing Towards Goal

## 2019-12-24 NOTE — PROGRESS NOTES
Cardiology Progress Note  2019     Admit Date: 2019  Admit Diagnosis: NSTEMI (non-ST elevated myocardial infarction) ([facility] [I21.4]  CC: none currently    Assessment:   Principal Problem:    NSTEMI (non-ST elevated myocardial infarction) ([facility] [address] (3/18/2017)      Plan:   Stable on current regimen. Would add prn NTG and [facility]. Volume status:euvolemic  Renal function: stable    For other plans, see orders. Subjective: Denis Escudero reports   Chest Pain:  [x]   none,  consistent with  []   non-cardiac   []   atypical   []   angina             [x]   none now    []      on-going  Dyspnea: [x]   none  []   at rest  []   with exertion     []   improved   []   unchanged   []   worsening  PND:       [x]   none  []   overnight    Orthopnea: [x]   none  []   improved  []   unchanged  []   worsening  Presyncope: [x]   none   []   improved    []   unchanged    []   worsening  Ambulated in hallway without symptoms  []   Yes  Ambulated in room without symptoms  []   Yes    Objective:    Physical Exam:  Overall VSSAF;    Visit Vitals  /84 (BP 1 Location: Right arm, BP Patient Position: At rest)   Pulse 76   Temp 97.2 °F (36.2 °C)   Resp 20   Ht 5' 8\" (1.727 m)   Wt 95.8 kg (211 lb 3.2 oz)   SpO2 97%   BMI 32.11 kg/m²     Temp (24hrs), Av.7 °F (36.5 °C), Min:97.2 °F (36.2 °C), Max:98.2 °F (36.8 °C)    Patient Vitals for the past 8 hrs:   Pulse   19 1100 76   19 0856 80   19 0724 74    Patient Vitals for the past 8 hrs:   Resp   19 1100 20   19 0724 14    Patient Vitals for the past 8 hrs:   BP   19 1100 143/84   19 0856 147/81   19 0724 110/90      No intake or output data in the 24 hours ending 19 1519    General Appearance: Well developed, well nourished, no acute distress. Ears/Nose/Mouth/Throat:   Normal MM; anicteric. JVP: WNL   Resp:   Lungs clear to auscultation bilaterally. Nl resp effort.    Cardiovascular:  RRR, S1, S2 normal, no new murmur. No gallop or rub. Abdomen:   Soft, non-tender, bowel sounds are present. Extremities: No edema bilaterally. Skin:  Neuro: Warm and dry. A/O x3, grossly nonfocal    []      cath site intact w/o hematoma or bruit; distal pulse unchanged. Data Review:     Telemetry independently reviewed : [x]   sinus  []   chronic afib   []   par afib  []      NSVT    ECG independently reviewed:  []   NSR   []   no significant changes  []   no new ECG provided for review  Lab results reviewed as noted below. Current medications reviewed as noted below. No results for input(s): PH, PCO2, PO2 in the last 72 hours. Recent Labs     12/23/19  0008 12/22/19 2149   TROIQ 0.09* 0.06*     Recent Labs     12/24/19 0410 12/23/19 0416 12/23/19 0207 12/22/19 2149   *  --   --  136   K 5.3*  --   --  4.4     --   --  102   CO2 19*  --   --  26   BUN 18  --   --  17   CREA 1.16  --   --  1.45*   *  --   --  307*   PHOS 3.1 3.2  --   --    CA 9.4  --   --  8.9   ALB 3.4*  --   --  3.3*   WBC 9.8  --  7.3 5.6   HGB 14.7  --  13.7 15.1   HCT 44.9  --  40.4 44.8     --  215 203     Recent Labs     12/24/19 0410 12/23/19 0416 12/22/19 2149   SGOT 42*  --  23   ALT 48  --  43     --  98   TBILI 0.5  --  0.4   TP 7.7  --  7.4   ALB 3.4*  --  3.3*   GLOB 4.3*  --  4.1*   LPSE  --  140  --      Recent Labs     12/23/19  0829 12/23/19  0147   APTT 35.1* 28.8      No results for input(s): FE, TIBC, PSAT, FERR in the last 72 hours.    Lab Results   Component Value Date/Time    Glucose (POC) 250 (H) 12/24/2019 11:31 AM    Glucose (POC) 215 (H) 12/24/2019 06:36 AM    Glucose (POC) 326 (H) 12/23/2019 09:17 PM    Glucose (POC) 134 (H) 12/23/2019 05:48 PM    Glucose (POC) 260 (H) 12/23/2019 12:18 PM       Current Facility-Administered Medications   Medication Dose Route Frequency    nitroglycerin (NITROSTAT) tablet 0.4 mg  0.4 mg SubLINGual Q5MIN PRN    nitroglycerin (NITROSTAT) tablet 0.4 mg  0.4 mg SubLINGual Q5MIN PRN    fentaNYL citrate (PF) injection 50 mcg  50 mcg IntraVENous Q4H PRN    albuterol-ipratropium (DUO-NEB) 2.5 MG-0.5 MG/3 ML  3 mL Nebulization Q4H PRN    alum-mag hydroxide-simeth (MYLANTA) oral suspension 30 mL  30 mL Oral TID PRN    aspirin chewable tablet 81 mg  81 mg Oral DAILY    calcium carbonate (TUMS) chewable tablet 400 mg [elemental]  400 mg Oral PRN    carvedilol (COREG) tablet 25 mg  25 mg Oral BID WITH MEALS    folic acid (FOLVITE) tablet 1 mg  1 mg Oral DAILY    gabapentin (NEURONTIN) tablet 600 mg  600 mg Oral TID    insulin glargine (LANTUS) injection 22 Units  22 Units SubCUTAneous DAILY    lisinopril (PRINIVIL, ZESTRIL) tablet 20 mg  20 mg Oral DAILY    fish oil-omega-3 fatty acids 340-1,000 mg capsule 1 Cap  1 Cap Oral DAILY    rosuvastatin (CRESTOR) tablet 40 mg  40 mg Oral DAILY    sertraline (ZOLOFT) tablet 100 mg  100 mg Oral DAILY    ticagrelor (BRILINTA) tablet 90 mg  90 mg Oral BID    sodium chloride (NS) flush 5-40 mL  5-40 mL IntraVENous Q8H    sodium chloride (NS) flush 5-40 mL  5-40 mL IntraVENous PRN    ondansetron (ZOFRAN) injection 4 mg  4 mg IntraVENous Q4H PRN    bisacodyl (DULCOLAX) tablet 5 mg  5 mg Oral DAILY PRN    insulin lispro (HUMALOG) injection   SubCUTAneous AC&HS    glucose chewable tablet 16 g  4 Tab Oral PRN    glucagon (GLUCAGEN) injection 1 mg  1 mg IntraMUSCular PRN    dextrose 10% infusion 0-250 mL  0-250 mL IntraVENous PRN    sodium chloride (NS) flush 5-40 mL  5-40 mL IntraVENous Q8H    sodium chloride (NS) flush 5-40 mL  5-40 mL IntraVENous PRN        Jose Elias Wayne MD

## 2019-12-24 NOTE — DISCHARGE SUMMARY
Dick Starkey 2906 SUMMARY    Name:  Aaron Eli  MR#:  [id number]  :  1970  ACCOUNT #:  [de-identified]  ADMIT DATE:  2019  DISCHARGE DATE:  2019      MAIN DIAGNOSIS ON ADMISSION:  Acute non-ST-elevation myocardial infarction with unstable angina. CONSULTANT:  Cardiology. HOSPITAL COURSE:  A 45-year-old gentleman with a past medical history significant for coronary artery disease, coronary artery bypass graft surgery and multiple stent placement, type 2 diabetes mellitus, primary hypertension, and dyslipidemia, who was in his usual state of health until about 3 days prior when the patient developed chest pain. The patient described the chest pain with the severity of 8/10 in intensity, pressure-like on the left side of the chest with radiation to the jaw and left upper extremity. The patient did not recollect any aggravating or relieving factors, however, did describe associated diaphoresis. The patient came to the emergency room with worsening symptoms. Of note, the patient was last admitted to the [facility] from 2018 to 2018. The patient was admitted with chest pain at that time, underwent a cardiac catheterization with stent placement and was discharged to home in stable condition. When the patient arrived to the emergency room, the patient was found to have an elevated troponin level. Repeat troponin level was even slightly higher. The emergency room physician consulted the patient's cardiologist group and admission to the hospital was advised. The patient was started on continuous infusion of heparin and nitroglycerin as advised by the cardiologist.  The patient was admitted to the step-down unit after further evaluation with an EKG that showed normal sinus rhythm and nonspecific ST or T-wave abnormalities, chest x-ray without any acute disease. CBC with a WBC of 5.6, hemoglobin 15.1, hematocrit 44.8, platelet count of 894. Metabolic panel with a sodium of 136, potassium 4.4, chloride 102, bicarbonate 26, glucose 307, BUN 17, creatinine 1.45, calcium 8.9, total bilirubin of 0.4, ALT of 43, AST of 23, alkaline phosphatase of 98, total protein of 7.4, albumin level of 3.3 with a globulin of 4.1. Cardiac profile that showed a first troponin of 0.06 with a repeat of 0.09. The patient was continued on the heparin, nitroglycerin, aspirin, statin, beta-blocker, and was seen and evaluated by the Cardiology Service, underwent a left heart catheterization with findings of coronary artery disease, however, did not require any stenting. The patient was also put on Accu-Chek monitoring with sliding scale insulin, diabetic teaching. Electrolytes including sodium, potassium, magnesium, phosphorus were all monitored and repleted. Hospital course was complicated by an episode of nonsustained ventricular tachycardia. .  No further recurrent ischemia was noted. The patient was followed up by Cardiology Service with recommendations for as needed nitroglycerin on discharge with outpatient followup with his primary cardiologist at the [facility]. After being cleared by Cardiology Service, the patient was deemed stable for discharge to home on this day 12/24/2019. PHYSICAL EXAMINATION: AND FINDINGS:  GENERAL:  The patient is awake, alert, denying new complaints. VITAL SIGNS:  Blood pressure of 143/84, heart rate 76, respiratory rate 20, afebrile, saturating 97% on room air. HEAD:  Pupils equal and reactive to light. ENT:  Ears, nose, and throat, clear. NECK:  No jugular venous distention. No carotid bruits. CARDIOVASCULAR:  S1, S2 present without any murmurs. RESPIRATORY:  Lungs with decreased air entry at both bases without any crepitations or rhonchi. GASTROINTESTINAL:  Bowel sounds present. The abdomen is soft, nontender. No rebound, no guarding. GENITOURINARY:  Nil of note. MUSCULOSKELETAL:  No asymmetry of the extremities.   SKIN: Nil of note. CENTRAL NERVOUS SYSTEM:  The patient is awake, alert, oriented to time, date, place, and person. LABORATORY FINDINGS ON DISCHARGE:  CBC with a WBC of 9.8, hemoglobin 14.7, hematocrit 44.9, platelet count 883. Metabolic panel with a sodium of 129, potassium 5.3, chloride 102, bicarbonate of 19, BUN of 18, creatinine 1.16, glucose of 236, calcium 9.4, magnesium of 1.9. A1c of 10.1. FINAL DIAGNOSES ON DISCHARGE:  1.  Acute non-ST-elevation myocardial infarction with unstable angina. 2.  Coronary artery disease with history of coronary artery bypass graft surgery. 3.  Primary hypertension. 4.  Dyslipidemia. 5.  Uncontrolled insulin-treated type 2 diabetes mellitus with complications including hyperglycemia. 6.  Mild hyperkalemia without any dysrhythmias. 7.  Resolving dehydration. DISCHARGE MEDICATIONS:  1. Norco 5/325 one tablet p.o. every 8 hours as needed for severe pain. 2.  Nitroglycerin 0.4 mg sublingual 1 tablet sublingually every 5 minutes as needed for chest pain. 3.  Albuterol inhaler 2 puffs every 4 hours as needed for wheezing. 4.  Mylanta 30 mL p.o. 3 times a day as needed. 5.  Aspirin 81 mg p.o. daily. 6.  Calcium carbonate 2 tablets p.o. as needed. 7.  Carvedilol 12.5 mg p.o. twice a day with meals. 8.  Folic acid 1 mg p.o. daily. 9.  Gabapentin 600 mg p.o. 3 times a day. 10.  Ibuprofen 400 mg 1 tablet p.o. every 6 hours as needed for pain. 11.  Lantus insulin 22 units subcu daily in the morning. 12.  Lisinopril 20 mg 1 tablet p.o. daily. 13.  NovoLog FlexPen 14 units subcu before breakfast, lunch, and dinner. 14.  Rosuvastatin 40 mg p.o. daily. 15.  Sertraline 100 mg p.o. daily. 16.  Ticagrelor 90 mg p.o. twice a day. DISCHARGE INSTRUCTIONS:  The patient is to follow a heart healthy diet. The patient to do daily activities as tolerated. The patient to take all medications as prescribed.   The patient is to follow up with PCP in 1-2 weeks' time from discharge. The patient to follow up with cardiologist at the [facility] in 1 week's time from discharge. The patient encouraged to return to any emergency room if any recurrence of symptoms or new symptoms develop. Recommendations for the patient to have a followup basic metabolic panel blood draw with the next office visit with primary care physician for monitoring of the kidney function and electrolytes. The patient is stable for discharge to home on this day, 12/24/2019. The entire discharge plans including all medications, their side effects, the importance of adhering to all outpatient followup plans discussed in detail with the patient. All questions and concerns were addressed. Total time for the discharge summary 50 minutes.         Graciela Oquendo MD      [id number]  D:  12/24/2019 17:02  T:  12/24/2019 18:50  JOB #:  [id number]

## 2019-12-26 ENCOUNTER — TELEPHONE (OUTPATIENT)
Dept: CARDIAC REHAB | Age: 49
End: 2019-12-26

## 2019-12-26 LAB
ATRIAL RATE: 88 BPM
ATRIAL RATE: 90 BPM
ATRIAL RATE: 93 BPM
CALCULATED P AXIS, ECG09: 52 DEGREES
CALCULATED P AXIS, ECG09: 54 DEGREES
CALCULATED P AXIS, ECG09: 57 DEGREES
CALCULATED R AXIS, ECG10: 77 DEGREES
CALCULATED R AXIS, ECG10: 79 DEGREES
CALCULATED R AXIS, ECG10: 88 DEGREES
CALCULATED T AXIS, ECG11: -172 DEGREES
CALCULATED T AXIS, ECG11: -34 DEGREES
CALCULATED T AXIS, ECG11: 84 DEGREES
DIAGNOSIS, 93000: NORMAL
P-R INTERVAL, ECG05: 148 MS
P-R INTERVAL, ECG05: 150 MS
P-R INTERVAL, ECG05: 156 MS
Q-T INTERVAL, ECG07: 388 MS
Q-T INTERVAL, ECG07: 392 MS
Q-T INTERVAL, ECG07: 396 MS
QRS DURATION, ECG06: 100 MS
QRS DURATION, ECG06: 104 MS
QRS DURATION, ECG06: 94 MS
QTC CALCULATION (BEZET), ECG08: 469 MS
QTC CALCULATION (BEZET), ECG08: 484 MS
QTC CALCULATION (BEZET), ECG08: 487 MS
VENTRICULAR RATE, ECG03: 88 BPM
VENTRICULAR RATE, ECG03: 90 BPM
VENTRICULAR RATE, ECG03: 93 BPM

## 2019-12-26 NOTE — TELEPHONE ENCOUNTER
12/26/2019 Cardiac Rehab: Called Mr. Charlotte Barney  to discuss participation in the Cardiac Rehab Program following NSTEMI on 12/23/2019. Spoke with the pt. who confirmed he has the contact information for the CWP at [facility] and will contact them. .   .  Ángel Benoit RN

## 2020-03-13 ENCOUNTER — APPOINTMENT (OUTPATIENT)
Dept: GENERAL RADIOLOGY | Age: 50
End: 2020-03-13
Attending: EMERGENCY MEDICINE
Payer: MEDICAID

## 2020-03-13 ENCOUNTER — HOSPITAL ENCOUNTER (OUTPATIENT)
Age: 50
Setting detail: OBSERVATION
Discharge: HOME | End: 2020-03-14
Attending: EMERGENCY MEDICINE | Admitting: INTERNAL MEDICINE
Payer: MEDICAID

## 2020-03-13 ENCOUNTER — APPOINTMENT (OUTPATIENT)
Dept: GENERAL RADIOLOGY | Age: 50
End: 2020-03-13
Attending: INTERNAL MEDICINE
Payer: MEDICAID

## 2020-03-13 DIAGNOSIS — R07.9 CHEST PAIN, UNSPECIFIED TYPE: Primary | ICD-10-CM

## 2020-03-13 LAB
ALBUMIN SERPL-MCNC: 3.5 G/DL (ref 3.5–5)
ALBUMIN/GLOB SERPL: 0.9 {RATIO} (ref 1.1–2.2)
ALP SERPL-CCNC: 82 U/L (ref 45–117)
ALT SERPL-CCNC: 52 U/L (ref 12–78)
ANION GAP SERPL CALC-SCNC: 11 MMOL/L (ref 5–15)
ANION GAP SERPL CALC-SCNC: 7 MMOL/L (ref 5–15)
AST SERPL-CCNC: 40 U/L (ref 15–37)
ATRIAL RATE: 100 BPM
ATRIAL RATE: 94 BPM
BASOPHILS # BLD: 0.1 K/UL (ref 0–0.1)
BASOPHILS NFR BLD: 1 % (ref 0–1)
BILIRUB SERPL-MCNC: 0.2 MG/DL (ref 0.2–1)
BUN SERPL-MCNC: 21 MG/DL (ref 6–20)
BUN SERPL-MCNC: 22 MG/DL (ref 6–20)
BUN/CREAT SERPL: 18 (ref 12–20)
BUN/CREAT SERPL: 19 (ref 12–20)
CALCIUM SERPL-MCNC: 9.1 MG/DL (ref 8.5–10.1)
CALCIUM SERPL-MCNC: 9.5 MG/DL (ref 8.5–10.1)
CALCULATED P AXIS, ECG09: 55 DEGREES
CALCULATED P AXIS, ECG09: 61 DEGREES
CALCULATED R AXIS, ECG10: 85 DEGREES
CALCULATED R AXIS, ECG10: 86 DEGREES
CALCULATED T AXIS, ECG11: -38 DEGREES
CALCULATED T AXIS, ECG11: -55 DEGREES
CHLORIDE SERPL-SCNC: 105 MMOL/L (ref 97–108)
CHLORIDE SERPL-SCNC: 108 MMOL/L (ref 97–108)
CHOLEST SERPL-MCNC: 254 MG/DL
CO2 SERPL-SCNC: 22 MMOL/L (ref 21–32)
CO2 SERPL-SCNC: 23 MMOL/L (ref 21–32)
COMMENT, HOLDF: NORMAL
COMMENT, HOLDF: NORMAL
CREAT SERPL-MCNC: 1.11 MG/DL (ref 0.7–1.3)
CREAT SERPL-MCNC: 1.19 MG/DL (ref 0.7–1.3)
D DIMER PPP FEU-MCNC: 0.22 MG/L FEU (ref 0–0.65)
DIAGNOSIS, 93000: NORMAL
DIAGNOSIS, 93000: NORMAL
DIFFERENTIAL METHOD BLD: ABNORMAL
EOSINOPHIL # BLD: 0.3 K/UL (ref 0–0.4)
EOSINOPHIL NFR BLD: 4 % (ref 0–7)
ERYTHROCYTE [DISTWIDTH] IN BLOOD BY AUTOMATED COUNT: 13.2 % (ref 11.5–14.5)
GLOBULIN SER CALC-MCNC: 3.9 G/DL (ref 2–4)
GLUCOSE BLD STRIP.AUTO-MCNC: 139 MG/DL (ref 65–100)
GLUCOSE BLD STRIP.AUTO-MCNC: 152 MG/DL (ref 65–100)
GLUCOSE BLD STRIP.AUTO-MCNC: 159 MG/DL (ref 65–100)
GLUCOSE BLD STRIP.AUTO-MCNC: 159 MG/DL (ref 65–100)
GLUCOSE BLD STRIP.AUTO-MCNC: 181 MG/DL (ref 65–100)
GLUCOSE SERPL-MCNC: 134 MG/DL (ref 65–100)
GLUCOSE SERPL-MCNC: 216 MG/DL (ref 65–100)
HCT VFR BLD AUTO: 40.3 % (ref 36.6–50.3)
HDLC SERPL-MCNC: 34 MG/DL
HDLC SERPL: 7.5 {RATIO} (ref 0–5)
HGB BLD-MCNC: 13.4 G/DL (ref 12.1–17)
IMM GRANULOCYTES # BLD AUTO: 0.1 K/UL (ref 0–0.04)
IMM GRANULOCYTES NFR BLD AUTO: 1 % (ref 0–0.5)
INR PPP: 0.9 (ref 0.9–1.1)
LDLC SERPL CALC-MCNC: ABNORMAL MG/DL (ref 0–100)
LDLC SERPL DIRECT ASSAY-MCNC: 134 MG/DL (ref 0–100)
LIPID PROFILE,FLP: ABNORMAL
LYMPHOCYTES # BLD: 2.2 K/UL (ref 0.8–3.5)
LYMPHOCYTES NFR BLD: 30 % (ref 12–49)
MCH RBC QN AUTO: 32 PG (ref 26–34)
MCHC RBC AUTO-ENTMCNC: 33.3 G/DL (ref 30–36.5)
MCV RBC AUTO: 96.2 FL (ref 80–99)
MONOCYTES # BLD: 0.3 K/UL (ref 0–1)
MONOCYTES NFR BLD: 5 % (ref 5–13)
NEUTS SEG # BLD: 4.4 K/UL (ref 1.8–8)
NEUTS SEG NFR BLD: 59 % (ref 32–75)
NRBC # BLD: 0 K/UL (ref 0–0.01)
NRBC BLD-RTO: 0 PER 100 WBC
P-R INTERVAL, ECG05: 170 MS
P-R INTERVAL, ECG05: 180 MS
PLATELET # BLD AUTO: 196 K/UL (ref 150–400)
PMV BLD AUTO: 10.1 FL (ref 8.9–12.9)
POTASSIUM SERPL-SCNC: 3.8 MMOL/L (ref 3.5–5.1)
POTASSIUM SERPL-SCNC: 4 MMOL/L (ref 3.5–5.1)
PROT SERPL-MCNC: 7.4 G/DL (ref 6.4–8.2)
PROTHROMBIN TIME: 9.8 SEC (ref 9–11.1)
Q-T INTERVAL, ECG07: 360 MS
Q-T INTERVAL, ECG07: 378 MS
QRS DURATION, ECG06: 110 MS
QRS DURATION, ECG06: 116 MS
QTC CALCULATION (BEZET), ECG08: 464 MS
QTC CALCULATION (BEZET), ECG08: 472 MS
RBC # BLD AUTO: 4.19 M/UL (ref 4.1–5.7)
SAMPLES BEING HELD,HOLD: NORMAL
SAMPLES BEING HELD,HOLD: NORMAL
SERVICE CMNT-IMP: ABNORMAL
SODIUM SERPL-SCNC: 138 MMOL/L (ref 136–145)
SODIUM SERPL-SCNC: 138 MMOL/L (ref 136–145)
TRIGL SERPL-MCNC: 686 MG/DL (ref ?–150)
TROPONIN I BLD-MCNC: <0.04 NG/ML (ref 0–0.08)
TROPONIN I SERPL-MCNC: 0.11 NG/ML
TROPONIN I SERPL-MCNC: 0.26 NG/ML
TROPONIN I SERPL-MCNC: 0.29 NG/ML
TROPONIN I SERPL-MCNC: <0.05 NG/ML
VENTRICULAR RATE, ECG03: 100 BPM
VENTRICULAR RATE, ECG03: 94 BPM
VLDLC SERPL CALC-MCNC: ABNORMAL MG/DL
WBC # BLD AUTO: 7.4 K/UL (ref 4.1–11.1)

## 2020-03-13 PROCEDURE — G0378 HOSPITAL OBSERVATION PER HR: HCPCS

## 2020-03-13 PROCEDURE — 74011250636 HC RX REV CODE- 250/636: Performed by: EMERGENCY MEDICINE

## 2020-03-13 PROCEDURE — 99285 EMERGENCY DEPT VISIT HI MDM: CPT

## 2020-03-13 PROCEDURE — 74011636637 HC RX REV CODE- 636/637: Performed by: INTERNAL MEDICINE

## 2020-03-13 PROCEDURE — 74011250636 HC RX REV CODE- 250/636: Performed by: INTERNAL MEDICINE

## 2020-03-13 PROCEDURE — 96372 THER/PROPH/DIAG INJ SC/IM: CPT

## 2020-03-13 PROCEDURE — 99218 HC RM OBSERVATION: CPT

## 2020-03-13 PROCEDURE — 74011000250 HC RX REV CODE- 250: Performed by: INTERNAL MEDICINE

## 2020-03-13 PROCEDURE — 96376 TX/PRO/DX INJ SAME DRUG ADON: CPT

## 2020-03-13 PROCEDURE — 80053 COMPREHEN METABOLIC PANEL: CPT

## 2020-03-13 PROCEDURE — 83721 ASSAY OF BLOOD LIPOPROTEIN: CPT

## 2020-03-13 PROCEDURE — 94760 N-INVAS EAR/PLS OXIMETRY 1: CPT

## 2020-03-13 PROCEDURE — 71045 X-RAY EXAM CHEST 1 VIEW: CPT

## 2020-03-13 PROCEDURE — 96374 THER/PROPH/DIAG INJ IV PUSH: CPT

## 2020-03-13 PROCEDURE — 74011250637 HC RX REV CODE- 250/637: Performed by: INTERNAL MEDICINE

## 2020-03-13 PROCEDURE — 93005 ELECTROCARDIOGRAM TRACING: CPT

## 2020-03-13 PROCEDURE — 85025 COMPLETE CBC W/AUTO DIFF WBC: CPT

## 2020-03-13 PROCEDURE — 84484 ASSAY OF TROPONIN QUANT: CPT

## 2020-03-13 PROCEDURE — 85610 PROTHROMBIN TIME: CPT

## 2020-03-13 PROCEDURE — 96375 TX/PRO/DX INJ NEW DRUG ADDON: CPT

## 2020-03-13 PROCEDURE — 82962 GLUCOSE BLOOD TEST: CPT

## 2020-03-13 PROCEDURE — 80061 LIPID PANEL: CPT

## 2020-03-13 PROCEDURE — 36415 COLL VENOUS BLD VENIPUNCTURE: CPT

## 2020-03-13 PROCEDURE — 85379 FIBRIN DEGRADATION QUANT: CPT

## 2020-03-13 PROCEDURE — C9113 INJ PANTOPRAZOLE SODIUM, VIA: HCPCS | Performed by: INTERNAL MEDICINE

## 2020-03-13 RX ORDER — METOPROLOL SUCCINATE 25 MG/1
25 TABLET, EXTENDED RELEASE ORAL DAILY
Status: DISCONTINUED | OUTPATIENT
Start: 2020-03-14 | End: 2020-03-14 | Stop reason: HOSPADM

## 2020-03-13 RX ORDER — SERTRALINE HYDROCHLORIDE 50 MG/1
50 TABLET, FILM COATED ORAL DAILY
Status: DISCONTINUED | OUTPATIENT
Start: 2020-03-13 | End: 2020-03-14 | Stop reason: HOSPADM

## 2020-03-13 RX ORDER — MAGNESIUM SULFATE 100 %
4 CRYSTALS MISCELLANEOUS AS NEEDED
Status: DISCONTINUED | OUTPATIENT
Start: 2020-03-13 | End: 2020-03-14 | Stop reason: HOSPADM

## 2020-03-13 RX ORDER — SODIUM CHLORIDE 0.9 % (FLUSH) 0.9 %
5-40 SYRINGE (ML) INJECTION AS NEEDED
Status: DISCONTINUED | OUTPATIENT
Start: 2020-03-13 | End: 2020-03-14 | Stop reason: HOSPADM

## 2020-03-13 RX ORDER — LANOLIN ALCOHOL/MO/W.PET/CERES
50 CREAM (GRAM) TOPICAL DAILY
Status: DISCONTINUED | OUTPATIENT
Start: 2020-03-14 | End: 2020-03-14 | Stop reason: HOSPADM

## 2020-03-13 RX ORDER — GUAIFENESIN 100 MG/5ML
81 LIQUID (ML) ORAL DAILY
Status: DISCONTINUED | OUTPATIENT
Start: 2020-03-13 | End: 2020-03-14 | Stop reason: HOSPADM

## 2020-03-13 RX ORDER — ISOSORBIDE MONONITRATE 30 MG/1
30 TABLET, EXTENDED RELEASE ORAL DAILY
Status: DISCONTINUED | OUTPATIENT
Start: 2020-03-13 | End: 2020-03-14 | Stop reason: HOSPADM

## 2020-03-13 RX ORDER — INSULIN LISPRO 100 [IU]/ML
0.05 INJECTION, SOLUTION INTRAVENOUS; SUBCUTANEOUS
Status: DISCONTINUED | OUTPATIENT
Start: 2020-03-13 | End: 2020-03-14 | Stop reason: HOSPADM

## 2020-03-13 RX ORDER — MORPHINE SULFATE 2 MG/ML
4 INJECTION, SOLUTION INTRAMUSCULAR; INTRAVENOUS ONCE
Status: COMPLETED | OUTPATIENT
Start: 2020-03-13 | End: 2020-03-13

## 2020-03-13 RX ORDER — ALBUTEROL SULFATE 90 UG/1
2 AEROSOL, METERED RESPIRATORY (INHALATION)
Status: DISCONTINUED | OUTPATIENT
Start: 2020-03-13 | End: 2020-03-13 | Stop reason: CLARIF

## 2020-03-13 RX ORDER — ALBUTEROL SULFATE 0.83 MG/ML
2.5 SOLUTION RESPIRATORY (INHALATION)
Status: DISCONTINUED | OUTPATIENT
Start: 2020-03-13 | End: 2020-03-13 | Stop reason: ALTCHOICE

## 2020-03-13 RX ORDER — LISINOPRIL 5 MG/1
2.5 TABLET ORAL DAILY
Status: DISCONTINUED | OUTPATIENT
Start: 2020-03-14 | End: 2020-03-14 | Stop reason: HOSPADM

## 2020-03-13 RX ORDER — FOLIC ACID 1 MG/1
1 TABLET ORAL DAILY
Status: DISCONTINUED | OUTPATIENT
Start: 2020-03-13 | End: 2020-03-14 | Stop reason: HOSPADM

## 2020-03-13 RX ORDER — ENOXAPARIN SODIUM 100 MG/ML
100 INJECTION SUBCUTANEOUS EVERY 12 HOURS
Status: DISCONTINUED | OUTPATIENT
Start: 2020-03-13 | End: 2020-03-14 | Stop reason: HOSPADM

## 2020-03-13 RX ORDER — METOPROLOL SUCCINATE 50 MG/1
25 TABLET, EXTENDED RELEASE ORAL DAILY
COMMUNITY

## 2020-03-13 RX ORDER — ISOSORBIDE MONONITRATE 30 MG/1
15 TABLET, EXTENDED RELEASE ORAL DAILY
Status: ON HOLD | COMMUNITY
End: 2020-03-13 | Stop reason: SDUPTHER

## 2020-03-13 RX ORDER — CLOPIDOGREL BISULFATE 75 MG/1
75 TABLET ORAL DAILY
COMMUNITY
End: 2020-03-16

## 2020-03-13 RX ORDER — ASPIRIN 81 MG
1 TABLET, DELAYED RELEASE (ENTERIC COATED) ORAL DAILY
COMMUNITY

## 2020-03-13 RX ORDER — LEVOFLOXACIN 250 MG/1
250 TABLET ORAL EVERY 24 HOURS
Status: DISCONTINUED | OUTPATIENT
Start: 2020-03-14 | End: 2020-03-13 | Stop reason: CLARIF

## 2020-03-13 RX ORDER — CLOPIDOGREL BISULFATE 75 MG/1
75 TABLET ORAL DAILY
Status: DISCONTINUED | OUTPATIENT
Start: 2020-03-14 | End: 2020-03-14 | Stop reason: HOSPADM

## 2020-03-13 RX ORDER — INSULIN GLARGINE 100 [IU]/ML
0.2 INJECTION, SOLUTION SUBCUTANEOUS DAILY
Status: DISCONTINUED | OUTPATIENT
Start: 2020-03-13 | End: 2020-03-14 | Stop reason: HOSPADM

## 2020-03-13 RX ORDER — LISINOPRIL 5 MG/1
2.5 TABLET ORAL DAILY
COMMUNITY

## 2020-03-13 RX ORDER — ROSUVASTATIN CALCIUM 40 MG/1
40 TABLET, COATED ORAL DAILY
Status: DISCONTINUED | OUTPATIENT
Start: 2020-03-13 | End: 2020-03-14 | Stop reason: HOSPADM

## 2020-03-13 RX ORDER — NICOTINE 7MG/24HR
1 PATCH, TRANSDERMAL 24 HOURS TRANSDERMAL DAILY
Status: DISCONTINUED | OUTPATIENT
Start: 2020-03-13 | End: 2020-03-14 | Stop reason: HOSPADM

## 2020-03-13 RX ORDER — MORPHINE SULFATE 2 MG/ML
1 INJECTION, SOLUTION INTRAMUSCULAR; INTRAVENOUS
Status: DISCONTINUED | OUTPATIENT
Start: 2020-03-13 | End: 2020-03-14

## 2020-03-13 RX ORDER — NITROGLYCERIN 0.4 MG/1
0.4 TABLET SUBLINGUAL
Status: DISCONTINUED | OUTPATIENT
Start: 2020-03-13 | End: 2020-03-14 | Stop reason: HOSPADM

## 2020-03-13 RX ORDER — DEXTROSE MONOHYDRATE 100 MG/ML
0-250 INJECTION, SOLUTION INTRAVENOUS AS NEEDED
Status: DISCONTINUED | OUTPATIENT
Start: 2020-03-13 | End: 2020-03-14 | Stop reason: HOSPADM

## 2020-03-13 RX ORDER — UREA 10 %
50 LOTION (ML) TOPICAL DAILY
COMMUNITY

## 2020-03-13 RX ORDER — GABAPENTIN 600 MG/1
600 TABLET ORAL 3 TIMES DAILY
Status: DISCONTINUED | OUTPATIENT
Start: 2020-03-13 | End: 2020-03-13 | Stop reason: ALTCHOICE

## 2020-03-13 RX ORDER — SODIUM CHLORIDE 0.9 % (FLUSH) 0.9 %
5-40 SYRINGE (ML) INJECTION EVERY 8 HOURS
Status: DISCONTINUED | OUTPATIENT
Start: 2020-03-13 | End: 2020-03-14 | Stop reason: HOSPADM

## 2020-03-13 RX ORDER — CARVEDILOL 12.5 MG/1
25 TABLET ORAL 2 TIMES DAILY WITH MEALS
Status: DISCONTINUED | OUTPATIENT
Start: 2020-03-13 | End: 2020-03-13 | Stop reason: ALTCHOICE

## 2020-03-13 RX ORDER — MORPHINE SULFATE 4 MG/ML
1 INJECTION INTRAVENOUS
Status: DISCONTINUED | OUTPATIENT
Start: 2020-03-13 | End: 2020-03-13 | Stop reason: SDUPTHER

## 2020-03-13 RX ORDER — LISINOPRIL 20 MG/1
20 TABLET ORAL DAILY
Status: DISCONTINUED | OUTPATIENT
Start: 2020-03-13 | End: 2020-03-13

## 2020-03-13 RX ORDER — ISOSORBIDE MONONITRATE 30 MG/1
30 TABLET, EXTENDED RELEASE ORAL DAILY
Qty: 30 TAB | Refills: 0 | Status: SHIPPED | OUTPATIENT
Start: 2020-03-13

## 2020-03-13 RX ORDER — ENOXAPARIN SODIUM 100 MG/ML
90 INJECTION SUBCUTANEOUS
Status: DISCONTINUED | OUTPATIENT
Start: 2020-03-13 | End: 2020-03-13 | Stop reason: SDUPTHER

## 2020-03-13 RX ORDER — INSULIN LISPRO 100 [IU]/ML
INJECTION, SOLUTION INTRAVENOUS; SUBCUTANEOUS
Status: DISCONTINUED | OUTPATIENT
Start: 2020-03-13 | End: 2020-03-14 | Stop reason: HOSPADM

## 2020-03-13 RX ORDER — LISINOPRIL 5 MG/1
2.5 TABLET ORAL DAILY
Status: DISCONTINUED | OUTPATIENT
Start: 2020-03-14 | End: 2020-03-13 | Stop reason: SDUPTHER

## 2020-03-13 RX ORDER — MORPHINE SULFATE 2 MG/ML
1 INJECTION, SOLUTION INTRAMUSCULAR; INTRAVENOUS
Status: DISCONTINUED | OUTPATIENT
Start: 2020-03-13 | End: 2020-03-13

## 2020-03-13 RX ADMIN — LISINOPRIL 20 MG: 20 TABLET ORAL at 08:49

## 2020-03-13 RX ADMIN — CARVEDILOL 25 MG: 12.5 TABLET, FILM COATED ORAL at 08:49

## 2020-03-13 RX ADMIN — NITROGLYCERIN 1 INCH: 20 OINTMENT TOPICAL at 05:58

## 2020-03-13 RX ADMIN — MORPHINE SULFATE 4 MG: 2 INJECTION, SOLUTION INTRAMUSCULAR; INTRAVENOUS at 04:45

## 2020-03-13 RX ADMIN — MORPHINE SULFATE 1 MG: 2 INJECTION, SOLUTION INTRAMUSCULAR; INTRAVENOUS at 23:01

## 2020-03-13 RX ADMIN — Medication 10 ML: at 14:49

## 2020-03-13 RX ADMIN — ENOXAPARIN SODIUM 100 MG: 100 INJECTION SUBCUTANEOUS at 07:02

## 2020-03-13 RX ADMIN — GABAPENTIN 600 MG: 600 TABLET, FILM COATED ORAL at 08:50

## 2020-03-13 RX ADMIN — MORPHINE SULFATE 1 MG: 2 INJECTION, SOLUTION INTRAMUSCULAR; INTRAVENOUS at 07:44

## 2020-03-13 RX ADMIN — NITROGLYCERIN 0.4 MG: 0.4 TABLET, ORALLY DISINTEGRATING SUBLINGUAL at 14:59

## 2020-03-13 RX ADMIN — ROSUVASTATIN 40 MG: 40 TABLET, FILM COATED ORAL at 08:49

## 2020-03-13 RX ADMIN — Medication 10 ML: at 23:02

## 2020-03-13 RX ADMIN — SODIUM CHLORIDE 500 ML: 900 INJECTION, SOLUTION INTRAVENOUS at 02:47

## 2020-03-13 RX ADMIN — INSULIN GLARGINE 20 UNITS: 100 INJECTION, SOLUTION SUBCUTANEOUS at 09:23

## 2020-03-13 RX ADMIN — Medication 1 CAPSULE: at 08:50

## 2020-03-13 RX ADMIN — ASPIRIN 81 MG 81 MG: 81 TABLET ORAL at 08:49

## 2020-03-13 RX ADMIN — SODIUM CHLORIDE 40 MG: 9 INJECTION INTRAMUSCULAR; INTRAVENOUS; SUBCUTANEOUS at 07:02

## 2020-03-13 RX ADMIN — ENOXAPARIN SODIUM 100 MG: 100 INJECTION SUBCUTANEOUS at 19:45

## 2020-03-13 RX ADMIN — NITROGLYCERIN 0.4 MG: 0.4 TABLET, ORALLY DISINTEGRATING SUBLINGUAL at 17:46

## 2020-03-13 RX ADMIN — FOLIC ACID 1 MG: 1 TABLET ORAL at 08:49

## 2020-03-13 RX ADMIN — SERTRALINE HYDROCHLORIDE 50 MG: 50 TABLET ORAL at 08:49

## 2020-03-13 RX ADMIN — MORPHINE SULFATE 4 MG: 2 INJECTION, SOLUTION INTRAMUSCULAR; INTRAVENOUS at 02:47

## 2020-03-13 RX ADMIN — ISOSORBIDE MONONITRATE 30 MG: 30 TABLET ORAL at 12:59

## 2020-03-13 NOTE — PROGRESS NOTES
Pt continues to have chest pain, dizziness. Does not feel stable and comfortable going home. Can be transferred to [address] [facility]. Would monitor for now. PRN SL NTG for chest pain. Trying to limit the use of opiate but if this does not help, would use low dose morphin IV judiciously. Troponin trending. No back pain currently and will hold off on XR lumbar. Monitor on tele.

## 2020-03-13 NOTE — ROUTINE PROCESS
TRANSFER - OUT REPORT: 
 
Verbal report given to Maykel Ny (name) on Hugo Hartmann  being transferred to  (unit) for routine progression of care Report consisted of patients Situation, Background, Assessment and  
Recommendations(SBAR). Information from the following report(s) SBAR, ED Summary, [facility] and Recent Results was reviewed with the receiving nurse. Lines:  
Peripheral IV 03/13/20 Right Antecubital (Active) Site Assessment Clean, dry, & intact 3/13/2020  2:47 AM  
Phlebitis Assessment 0 3/13/2020  2:47 AM  
Infiltration Assessment 0 3/13/2020  2:47 AM  
Dressing Status Clean, dry, & intact 3/13/2020  2:47 AM  
Dressing Type Transparent 3/13/2020  2:47 AM  
Hub Color/Line Status Pink;Flushed;Patent 3/13/2020  2:47 AM  
Action Taken Blood drawn 3/13/2020  2:47 AM  
  
 
Opportunity for questions and clarification was provided.

## 2020-03-13 NOTE — PROGRESS NOTES
Admission Medication Reconciliation:    Information obtained from: [facility] medication list updated with information provided by the [address] [place] [address]; I contacted them by phone [phone]. Summary:     Recommendations: (1) Review need for insulin glargine while inpatient. Mr. Brook Parks is on scheduled doses of pre-meal short acting insulin but insulin glargine is not prescription outpatient. (2) Hold empagliflozin while inpatient due to risk of euglycemic ketoacidosis with acute illness. (3) Hold semaglutide while inpatient. It's a once weekly medication that is non-formulary. Medications added: clopidogrel, rivaroxaban, empagliflozin, isosorbide mononitrate, metoprolol succinate, MVI/minerals, semaglutide, thiamine     Medications deleted: albuterol, Mylanta, calcium carbonate, carvedilol, gabapentin, ibuprofen, insulin glargine, nitroglycerin, ticagrelor     Doses changed: lisinopril 2.5 mg daily (versus 20 mg), fish oil 2 caps/day (versus 1 cap/day), sertraline 50 mg/day (versus 100 mg/day)    Inpatient orders have been reviewed and Dr. Berny Bautista has been contacted regarding changes to the [facility] medication history. Chief Complaint for this Admission:  unstable angina     Significant PMH/Disease States:   Past Medical History:   Diagnosis Date    CAD (coronary artery disease)     Diabetes ([facility] [address]     \"prediabetes\" Was suppos to go clinic at  [place]    Endocarditis     History of cardiac cath     Hypertension     MI (myocardial infarction) ([facility] [address]        Allergies:  Patient has no known allergies. Prior to Admission Medications:   Prior to Admission Medications   Prescriptions Last Dose Informant Patient Reported? Taking?   aspirin 81 mg chewable tablet   Yes Yes   Sig: Take 81 mg by mouth daily. clopidogreL (Plavix) 75 mg tab   Yes Yes   Sig: Take 75 mg by mouth daily. empagliflozin (Jardiance) 25 mg tablet   Yes Yes   Sig: Take 12.5 mg by mouth daily.    fish oil-omega-3 fatty acids (Fish Oil) 340-1,000 mg capsule   Yes Yes   Sig: Take 2 Caps by mouth daily. folic acid (FOLVITE) 1 mg tablet   Yes Yes   Sig: Take 1 mg by mouth daily. insulin aspart (NOVOLOG FLEXPEN) 100 unit/mL inpn   Yes Yes   Si Units by SubCUTAneous route Before breakfast, lunch, and dinner. isosorbide mononitrate ER (IMDUR) 30 mg tablet   Yes Yes   Sig: Take 15 mg by mouth daily. lisinopriL (PRINIVIL, ZESTRIL) 5 mg tablet   Yes Yes   Sig: Take 2.5 mg by mouth daily. (dose = 0.5 x 5 mg tablet)   metoprolol succinate (TOPROL-XL) 50 mg XL tablet   Yes Yes   Sig: Take 25 mg by mouth daily. (dose = 0.5 x 50 mg tablet)   multivitamin,tx-iron-ca-min (Thera-M) 27-0.4 mg tab   Yes Yes   Sig: Take 1 Tab by mouth daily. nitroglycerin (NITROSTAT) 0.4 mg SL tablet   Yes No   Sig: by SubLINGual route every five (5) minutes as needed. rivaroxaban (Xarelto) 2.5 mg tablet   Yes Yes   Sig: Take 2.5 mg by mouth two (2) times daily (with meals). rosuvastatin (CRESTOR) 40 mg tablet  Transfer Papers Yes Yes   Sig: Take 40 mg by mouth daily. semaglutide (OZEMPIC) 1 mg/dose (2 mg/1.5 mL) sub-q pen   Yes Yes   Si mg by SubCUTAneous route every seven (7) days. sertraline (ZOLOFT) 100 mg tablet   Yes Yes   Sig: Take 50 mg by mouth daily. (dose = 0.5 x 100 mg tablet)   thiamine (B-1) 50 mg tablet   Yes Yes   Sig: Take 50 mg by mouth daily. Facility-Administered Medications: None         Thank you for allowing me to participate in the care of this patient. Please contact the pharmacy at  or the medication reconciliation pharmacist at  with any questions. Kirit Lizarraga, Pharm. D., BCPS, BCPPS

## 2020-03-13 NOTE — PROGRESS NOTES
D/w Dr. [facility] outpatient on Monday. Imdur added  D/w Pharmacy. Med-rec from [place] updated. Imdur added, 30mg daily. Further per Dr. Sandro Valencia. No opiates for pain. Pt has preference for Morphin IV with specific ask for the same. DC later today if hemodynamically stable. DC full dose lovenox. Contiune plavix and Xarelto as started at [place] on last f/up. Possible DC later today if clinically stable. If patient continue to have chest pain, would consider transfer to [place].

## 2020-03-13 NOTE — ED TRIAGE NOTES
Pt coming from home with CP that started 5hrs ago. Hx stents and bypass surgery. Per EMS 4 baby Asprin & nitro x2.  Pt took 6 nitro prior to EMS arrival. CP 8/10 B/S 189

## 2020-03-13 NOTE — CARDIO/PULMONARY
Cardiac Rehab: Per EMR, patient is a current smoker.  Smoking Cessation Program information placed on the AVS.   
Zia Calvo RN 
 

spouse/children

## 2020-03-13 NOTE — PROGRESS NOTES
Patient restless in bed complaining of chest pain, MD notified, said to try nitro sublingual.   nitro sublingual given, patient sleeping after admin. Woken and asked about affect,  patient stated that it helped some and then went back to sleep.

## 2020-03-13 NOTE — H&P
[address] [facility] Adult  Hospitalist Group  History and Physical    Primary Care Provider: None  Date of Service:  3/13/2020    Subjective:     Radha Inman is a 51 y.o. male with PMH of HTN, MI, CAD s/p CABG 6 years ago, combined CHFlast TTE 12/2019 with EF 40-45% and stage I DD, DM 2 on insulin; presented to ED for feeling of chest pain for last 5 hours, intermittent off and on, moderate to severe 8/10 at the peak, partly relieved with NTG SL, aggravated with exertion, however started at rest.  Patient has also taken 4 baby aspirin along with about 8 sublingual NTG before arrival to ED. Morphine in the ED has helped some. Not completely pain-free when I evaluated the patient. Initially was about 12/10 but in the later part of the conversation suggested to have more pain and ask for morphine again. Chest pain is similar to his previous heart attacks. Radiation to under the chin/jaw and neck as well. Some nausea but denied vomiting. No diaphoresis. No epigastric pain or heartburns. Uses Tums occasionally maybe once a month. No recent change in medications. Last TTE and LHC in 12/2019 patient was admitted for NSTEMI. No PCI done at that time. Patient follows with Dr. Bianca Aguilar here, however has primary cardiologist at [place]. Some medication discrepancy in regards to Brilinta versus Plavix and Xarelto. According to patient he is on Plavix and Xarelto, however last DC summary here suggest Brilinta. In the ED, EKG shows new T wave inversion in 3 and aVF as compared to previous flat ST-T segment. No dynamic ST elevation noted. Initial troponin less than 0.05. Patient also complained of low back pain since yesterday with radiation of pain both legs and feet. Patient has ran out of his gabapentin for 2 weeks and not taking. Denied any tingling or numbness. No history of trauma. Suggested to have done some housework, but nothing extraordinary.     Review of Systems:    A comprehensive review of systems was negative except for that written in the History of Present Illness. Past Medical History:   Diagnosis Date    CAD (coronary artery disease)     Diabetes ([facility] [address]     \"prediabetes\" Was suppos to go clinic at  [place]    Endocarditis     History of cardiac cath     Hypertension     MI ([facility] infarction[facility])       Past Surgical History:   Procedure Laterality Date    CARDIAC SURG PROCEDURE UNLIST      cath with stent placement feb 2012    CARDIAC SURG PROCEDURE UNLIST      CABGx4     Prior to Admission medications    Medication Sig Start Date End Date Taking? Authorizing Provider   nitroglycerin (NITROSTAT) 0.4 mg SL tablet 1 Tab by SubLINGual route every five (5) minutes as needed for Chest Pain. Up to 3 doses. 12/24/19   Satira Gaucher, MD   lisinopril (PRINIVIL, ZESTRIL) 20 mg tablet Take 1 Tab by mouth daily. 8/10/18   Dudley Masters MD   folic acid (FOLVITE) 1 mg tablet Take  by mouth daily. Other, MD Luis Alfredo   sertraline (ZOLOFT) 100 mg tablet Take 100 mg by mouth daily. Other, MD Luis Alfredo   ticagrelor (BRILINTA) 90 mg tablet Take 1 Tab by mouth every twelve (12) hours every twelve (12) hours. 2/8/18   Marti Patterson MD   ticagrelor (BRILINTA) 90 mg tablet Take 90 mg by mouth two (2) times a day. Provider, Historical   rosuvastatin (CRESTOR) 40 mg tablet Take 40 mg by mouth daily. Provider, Historical   albuterol (PROVENTIL HFA, VENTOLIN HFA, PROAIR HFA) 90 mcg/actuation inhaler Take 2 Puffs by inhalation every four (4) hours as needed for Wheezing. Provider, Historical   omega 3-dha-epa-fish oil (FISH OIL) 100-160-1,000 mg cap Take 1,000 mg by mouth daily. Provider, Historical   alum-mag hydroxide-simeth (MYLANTA) 200-200-20 mg/5 mL susp Take 30 mL by mouth three (3) times daily as needed. Provider, Historical   carvedilol (COREG) 12.5 mg tablet Take 25 mg by mouth two (2) times daily (with meals).     Provider, Historical   gabapentin (NEURONTIN) 600 mg tablet Take 600 mg by mouth three (3) times daily. Indications: NEUROPATHIC PAIN    Provider, Historical   insulin glargine (LANTUS) 100 unit/mL injection 22 Units by SubCUTAneous route daily. Indications: takes in the am    Provider, Historical   insulin aspart (NOVOLOG FLEXPEN) 100 unit/mL inpn 14 Units by SubCUTAneous route Before breakfast, lunch, and dinner. Indications: Gestational Diabetes Mellitus    Provider, Historical   ibuprofen (MOTRIN) 400 mg tablet Take 1 tablet by mouth every six (6) hours as needed for Pain. 10/11/14   Arminda Baer MD   aspirin 81 mg chewable tablet Take 81 mg by mouth daily. Other, MD Luis Alfredo   nitroglycerin (NITROSTAT) 0.4 mg SL tablet by SubLINGual route every five (5) minutes as needed. Other, MD Luis Alfredo   calcium carbonate (TUMS) 200 mg calcium (500 mg) Chew Take 2 Tabs by mouth as needed. Provider, Historical     No Known Allergies   Family History   Problem Relation Age of Onset    Stroke Father         SOCIAL HISTORY:  Patient resides at Home. Patient ambulates with no assistance. Smoking history: Still smokes, about 5 cigarettes a day according to patient  Alcohol history: Maybe once a month socially. Uses pot smoking as well occasionally. Objective:       Physical Exam:   General:          Alert, cooperative, mild intermittent distress, appears stated age. Dozing off   intermittently, sleepy however easily arousable    HEENT:           Atraumatic, anicteric sclerae, pink conjunctivae                          No oral ulcers, mucosa moist, throat clear, dentition fair  Neck:               Supple, symmetrical, no JVD, no carotid bruit  Lungs:             Clear to auscultation bilaterally. No Wheezing or Rhonchi. No rales. Chest wall:      No tenderness  No Accessory muscle use. Heart:              Regular  rhythm,  No  murmur   No edema  Abdomen:        Soft, non-tender. Not distended. Bowel sounds normal  Extremities:     No cyanosis.   No clubbing,                            Skin turgor normal, Capillary refill normal  Skin:                Not pale. Not Jaundiced  No rashes   Psych:             Not anxious or agitated. Neurologic:      Alert, moves all extremities, answers questions appropriately and responds to   commands     Cap refill: Brisk, less than 3 seconds  Pulses: 2+, symmetric in all extremities    ECG:  normal EKG, normal sinus rhythm, T inversion in 3 and aVF new as compared to EKG from 12/2019 where ST segment in 3 and aVF was more flat    Data Review: All diagnostic labs and studies have been reviewed. Xr Chest Port    Result Date: 3/13/2020  IMPRESSION: No acute findings. Recent Results (from the past 24 hour(s))   EKG, 12 LEAD, INITIAL    Collection Time: 03/13/20  2:23 AM   Result Value Ref Range    Ventricular Rate 100 BPM    Atrial Rate 100 BPM    P-R Interval 180 ms    QRS Duration 116 ms    Q-T Interval 360 ms    QTC Calculation (Bezet) 464 ms    Calculated P Axis 61 degrees    Calculated R Axis 85 degrees    Calculated T Axis -38 degrees    Diagnosis       Normal sinus rhythm  Inferior infarct (cited on or before 18-MAR-2017)  When compared with ECG of 23-DEC-2019 01:07,  Inverted T waves have replaced nonspecific T wave abnormality in Inferior   leads     CBC WITH AUTOMATED DIFF    Collection Time: 03/13/20  2:35 AM   Result Value Ref Range    WBC 7.4 4.1 - 11.1 K/uL    RBC 4.19 4. 10 - 5.70 M/uL    HGB 13.4 12.1 - 17.0 g/dL    HCT 40.3 36.6 - 50.3 %    MCV 96.2 80.0 - 99.0 FL    MCH 32.0 26.0 - 34.0 PG    MCHC 33.3 30.0 - 36.5 g/dL    RDW 13.2 11.5 - 14.5 %    PLATELET 259 370 - 266 K/uL    MPV 10.1 8.9 - 12.9 FL    NRBC 0.0 0  WBC    ABSOLUTE NRBC 0.00 0.00 - 0.01 K/uL    NEUTROPHILS 59 32 - 75 %    LYMPHOCYTES 30 12 - 49 %    MONOCYTES 5 5 - 13 %    EOSINOPHILS 4 0 - 7 %    BASOPHILS 1 0 - 1 %    IMMATURE GRANULOCYTES 1 (H) 0.0 - 0.5 %    ABS. NEUTROPHILS 4.4 1.8 - 8.0 K/UL    ABS.  LYMPHOCYTES 2.2 0.8 - 3.5 K/UL    ABS. MONOCYTES 0.3 0.0 - 1.0 K/UL    ABS. EOSINOPHILS 0.3 0.0 - 0.4 K/UL    ABS. BASOPHILS 0.1 0.0 - 0.1 K/UL    ABS. IMM. GRANS. 0.1 (H) 0.00 - 0.04 K/UL    DF AUTOMATED     METABOLIC PANEL, COMPREHENSIVE    Collection Time: 03/13/20  2:35 AM   Result Value Ref Range    Sodium 138 136 - 145 mmol/L    Potassium 3.8 3.5 - 5.1 mmol/L    Chloride 105 97 - 108 mmol/L    CO2 22 21 - 32 mmol/L    Anion gap 11 5 - 15 mmol/L    Glucose 216 (H) 65 - 100 mg/dL    BUN 22 (H) 6 - 20 MG/DL    Creatinine 1.19 0.70 - 1.30 MG/DL    BUN/Creatinine ratio 18 12 - 20      GFR est AA >60 >60 ml/min/1.73m2    GFR est non-AA >60 >60 ml/min/1.73m2    Calcium 9.5 8.5 - 10.1 MG/DL    Bilirubin, total 0.2 0.2 - 1.0 MG/DL    ALT (SGPT) 52 12 - 78 U/L    AST (SGOT) 40 (H) 15 - 37 U/L    Alk. phosphatase 82 45 - 117 U/L    Protein, total 7.4 6.4 - 8.2 g/dL    Albumin 3.5 3.5 - 5.0 g/dL    Globulin 3.9 2.0 - 4.0 g/dL    A-G Ratio 0.9 (L) 1.1 - 2.2     TROPONIN I    Collection Time: 03/13/20  2:35 AM   Result Value Ref Range    Troponin-I, Qt. <0.05 <0.05 ng/mL   SAMPLES BEING HELD    Collection Time: 03/13/20  2:35 AM   Result Value Ref Range    SAMPLES BEING HELD 1RED 1 HYUN     COMMENT        Add-on orders for these samples will be processed based on acceptable specimen integrity and analyte stability, which may vary by analyte. SAMPLES BEING HELD    Collection Time: 03/13/20  6:13 AM   Result Value Ref Range    SAMPLES BEING HELD 1RED 1BLU 1LAV     COMMENT        Add-on orders for these samples will be processed based on acceptable specimen integrity and analyte stability, which may vary by analyte. Left heart catheterization 12/23/2019:  Diagnostic   Dominance: Right   Left Main   The vessel is moderate in size. The vessel is angiographically normal.   Left Anterior Descending   Prox LAD lesion 100% stenosed. .   Left Circumflex   Ost Cx to Prox Cx lesion 100% stenosed.  .   Right Coronary Artery   Collaterals   Dist RCA filled by collaterals from Dist RCA. Prox RCA lesion 100% stenosed. .   Dist RCA lesion 100% stenosed. .   First Right Posterolateral Branch   Collaterals   1st RPL filled by collaterals from Dist LAD. Graft to 1st Mrg   Origin to Prox Graft lesion 100% stenosed. Kari Chelsea DEVLIN Graft to Mid LAD     Intervention   No interventions have been documented. Echocardiogram 12/23/2019:  Final result   · Image quality for this study was suboptimal.  · Mildly dilated left atrium. · Normal wall thickness. Mildly dilated left ventricle. No evidence of left ventricular obstruction. Moderate systolic dysfunction. Estimated left ventricular ejection fraction is 40 - 45%. Left ventricular global hypokinesis. Mild (grade 1) left ventricular diastolic dysfunction. · Mildly elevated central venous pressure (5-10 mmHg); IVC diameter is less than 21 mm and collapses less than 50% with respiration. · Moderate mitral valve regurgitation is present. Assessment:     Active Problems:    Chest pain (3/13/2020)    #Chest pain, rule out ACS, with new EKG changes T inversion in 3 and aVF, in known patient with CAD s/p CABG  -Admit to observation, ALOS<2 MN, telemetry  -Rule out ACS, serial troponin and EKG  -Cardiology consultDr. Ro's patient  -Continue home ASA 81, Crestor, lisinopril, Coreg  -Clarification and continuation of Brilinta versus Plavix and Xarelto.   We will seek pharmacy help  -Given recent TTE and LHC, will not order any cardiac work-up and defer to cardiology  -Lovenox 1 mg/KG every 12 hours subcu until ACS is ruled out  -Nitropaste versus sublingual, caution for hypotension  -Low-dose morphine as needed IV, judicious use  -Lipid panel  -N.p.o. for now    #Chronic combined systolic and diastolic HF, compensated, appears euvolemic  -Continue home medications  -Not on any diuretics currently  -Daily weights and I/O's    #Low back pain, with possible radiculopathy versus neuropathy  -X-ray lumbar spine  -Resume gabapentin    #DM2, poorly controlled  -Last A1c 10.1 in 12/2019  -Continue Lantus 20 units every morning, nightly n.p.o. for now.   -Uses Lantus 30 units at home every morning  -Also uses NovoLog 14 units with meals at home  -Humalog SSI and mealtime 6 units 3 times daily AC  -Monitor her serial Accu-Cheks and adjust as needed    #HTN, controlled  -Continue home medicationsCoreg, lisinopril  -Monitor and adjust as needed    #COPD, known smoker, cutting back  -Albuterol inhaler as needed  -Nicotine patch 7 mg / 24 hours daily per protocol    #HLD  -Continue home Crestor  -Lipid panel    CODE STATUS: Full code  DVT prophylaxis: Lovenox subcu full dose    Emergency contact: Significant other, Mirlande Payne, [phone]    Disposition: Home when clinically ready  Anticipated discharge: ALOS<2 MN  Case discussed with: Patient, nurse, pharmacy    Plan:     As noted above  Gavin Buckley 3. (including history of recent falls): Ambulates independently    Signed By: Natasha Tadeo MD     March 13, 2020

## 2020-03-13 NOTE — ED PROVIDER NOTES
51 y.o. male with past medical history significant for hypertension, MI, CAD, diabetes, and CHF who presents from home via EMS with chief complaint of chest pain. He states he first started feeling pain in his chest, radiating up to his neck, as well as pain in the backs of his bilateral legs, beginning 5 hours ago. He also complains of pain in his mid lower back with onset prior to 5 hours ago. He reports taking 4 baby aspirin and 6 Nitro prior to EMS arrival, and taking another kept in his pocket after arrival to the hospital. With the [facility] given to him en route to the hospital, patient took 8 Nitro total in the last 5 hours. He denies injury, and denies abdominal pain. Patient has history of MI, and states this current episode of pain is similar to the pain he felt with previous heart attack. He takes rosuvastatin, lisinopril, and Xarelto, and denies history of blood clots in his lungs or legs. Patient was admitted in August 7-9 2019 for chest pain catheterization, which showed CAD, and stents were placed by Dr. Lisha Ortiz.    Echo on 12/23/2019 found to have an LVEF 40-45% in global hypokinesis. There are no other acute medical concerns at this time. Social hx: occasional smoker, occasional alcohol use, previous marijuana, cocaine, and heroin use  Surgical hx: Significant for cath with stent placement, CABG x4  PCP: None    Note written by Armida Estrada, as dictated by Jaime Garcia MD 2:54 AM         The history is provided by the patient, the EMS personnel and medical records.         Past Medical History:   Diagnosis Date    CAD (coronary artery disease)     Diabetes ([address]     \"prediabetes\" Was suppos to go clinic at  [place]    Endocarditis     History of cardiac cath     Hypertension     MI ([facility])        Past Surgical History:   Procedure Laterality Date    CARDIAC SURG PROCEDURE UNLIST      cath with stent placement feb 2012    CARDIAC SURG PROCEDURE UNLIST CABGx4         Family History:   Problem Relation Age of Onset    Stroke Father        Social History     Socioeconomic History    Marital status: SINGLE     Spouse name: Not on file    Number of children: Not on file    Years of education: Not on file    Highest education level: Not on file   Occupational History    Not on file   Social Needs    Financial resource strain: Not on file    Food insecurity     Worry: Not on file     Inability: Not on file    Transportation needs     Medical: Not on file     Non-medical: Not on file   Tobacco Use    Smoking status: Former Smoker     Last attempt to quit: 2012     Years since quittin.1    Smokeless tobacco: Never Used   Substance and Sexual Activity    Alcohol use: Yes     Comment: occassionl    Drug use: No     Comment: quit maijuana about 2 weeks ago/ prev use cocaine, heroin IV    Sexual activity: Not Currently     Partners: Female   Lifestyle    Physical activity     Days per week: Not on file     Minutes per session: Not on file    Stress: Not on file   Relationships    Social connections     Talks on phone: Not on file     Gets together: Not on file     Attends [employer] service: Not on file     Active member of club or organization: Not on file     Attends meetings of clubs or organizations: Not on file     Relationship status: Not on file    Intimate partner violence     Fear of current or ex partner: Not on file     Emotionally abused: Not on file     Physically abused: Not on file     Forced sexual activity: Not on file   Other Topics Concern    Not on file   Social History Narrative    Not on file         ALLERGIES: Tramadol    Review of Systems   Cardiovascular: Positive for chest pain. Gastrointestinal: Negative for abdominal pain. Musculoskeletal: Positive for neck pain. Positive for leg pain (bilateral)   All other systems reviewed and are negative.       Vitals:    20 0330 20 0345 20 0400 20 0411   BP: 118/68 114/71 106/61    Pulse: 98 99 97    Resp: 19 18 19    Temp:       SpO2: 90% 93% 92% 98%            Physical Exam  Vitals signs and nursing note reviewed. Constitutional:       Appearance: He is well-developed. He is obese. HENT:      Head: Normocephalic and atraumatic. Eyes:      Pupils: Pupils are equal, round, and reactive to light. Neck:      Musculoskeletal: Normal range of motion and neck supple. Cardiovascular:      Rate and Rhythm: Normal rate and regular rhythm. Pulses:           Dorsalis pedis pulses are 2+ on the right side and 2+ on the left side. Heart sounds: Normal heart sounds. Pulmonary:      Effort: Pulmonary effort is normal. No respiratory distress. Breath sounds: Normal breath sounds. No wheezing. Abdominal:      General: Bowel sounds are normal.      Palpations: Abdomen is soft. Tenderness: There is no abdominal tenderness. There is no guarding or rebound. Musculoskeletal: Normal range of motion. Right lower leg: No edema. Left lower leg: No edema. Skin:     General: Skin is warm and dry. Neurological:      Mental Status: He is alert and oriented to person, place, and time. Psychiatric:         Behavior: Behavior normal.     Note written by Armida Godinez, as dictated by Jhonatan Aguilar MD 2:57 AM      MDM  Number of Diagnoses or Management Options  Diagnosis management comments: 70-year-old male with past medical history significant for coronary artery disease, diabetes, hypertension, CABG presents with complaints of chest pain starting this evening, not relieved by nitroglycerin at home. Aspirin taken prior to arrival.  Patient is well-appearing, no acute distress, hemodynamically stable, neurologically intact, no respiratory distress, clear to auscultation bilaterally, normal room air oxygen saturation, no lower extremity edema. Plan-EKG, cardiac monitor, chest x-ray, CBC/CMP/cardiac enzymes, pain control.     Labs unremarkable  Chest x-ray unremarkable       Amount and/or Complexity of Data Reviewed  Clinical lab tests: ordered and reviewed  Tests in the radiology section of CPT®: ordered and reviewed    Patient Progress  Patient progress: stable         Procedures    ED EKG interpretation:  Rhythm: normal sinus rhythm; Rate (approx.): 100; normal ME; normal QRS; T wave inversion in leads 3, AVF, V6; evidence of right heart strain. Compare to 12/23/2019, he has new T wave inversion in 3 and AVF. Note written by Armida Nation, as dictated by Laron Beach MD 2:38 AM      4 AM  Patient reports pain somewhat improved, continued little pain. Agreeable with plan for admission with new T wave inversions. Patient stable, no acute distress.

## 2020-03-13 NOTE — PROGRESS NOTES
3/13/2020; 13:40 -   CM was notified by bedside nursing that patient's chest pain continues and that medical team has requested to initiate patient's transfer to [facility], because [facility] cardio team cannot do cardiac cath until 3/16. CM met with patient to discuss possible transfer to [facility]; patient is in agreement and signed transfer request form. CM contacted [facility] [address] (Hugo Alexis: [phone], [phone]. [phone]) to update on status. CM faxed updated Transfer Request Form to confirmed fax, F: [phone]; originals on hard chart. [facility].   CM notified [facility]: [phone]) of patient's potential transfer to [facility] ([facility]) AT [place].  CRM: Kelly Merlos, MPH, [address] [place] [address]; Z: [phone]

## 2020-03-13 NOTE — CONSULTS
Cardiology Consult Note    CC: CP  Reason for consult:  Elevated troponin  Requesting MD:  Dr. Anil Ayers     Subjective:      Date of  Admission: 3/13/2020  2:19 AM     Admission type:Emergency    Petr Goetz is a 51 y.o. male admitted for Chest pain [R07.9]. Patient complains of SSCP with SOB. Narciso Dunn He started having more yesterday. He still smokes as I can smell. His past cardiac include CAD, s/p 3 vs CABG, s/p stents in LCX/OM1/OM2, ischemic cardiomyopathy, & h/o NSTEMI. He is receiving his care and medications at [facility]. He has noted worsening TORRES.      Patient Active Problem List    Diagnosis Date Noted    Chest pain 03/13/2020    Dehydration 08/08/2018    Acute chest pain 08/08/2018    Type 2 diabetes mellitus with hyperglycemia ([facility] 08/08/2018    NSTEMI (non-ST elevated myocardial infarction) ([facility] 03/18/2017    Acute non Q wave MI (myocardial infarction), [facility]) 02/23/2012    Chest pain at rest 02/22/2012    CAD (coronary artery disease) 02/22/2012    HTN (hypertension) 02/22/2012    Hyperlipidemia 02/22/2012    Unstable angina ([facility] 02/22/2012      None  Past Medical History:   Diagnosis Date    CAD (coronary artery disease)     Diabetes ([facility]     \"prediabetes\" Was suppos to go clinic at  [place]    Endocarditis     History of cardiac cath     Hypertension     MI (myocardial infarction) ([facility]       Past Surgical History:   Procedure Laterality Date    CARDIAC SURG PROCEDURE UNLIST      cath with stent placement feb 2012    CARDIAC SURG PROCEDURE UNLIST      CABGx4     No Known Allergies   Family History   Problem Relation Age of Onset    Stroke Father       Current Facility-Administered Medications   Medication Dose Route Frequency    sodium chloride (NS) flush 5-40 mL  5-40 mL IntraVENous Q8H    sodium chloride (NS) flush 5-40 mL  5-40 mL IntraVENous PRN    nitroglycerin (NITROBID) 2 % ointment 1 Inch  1 Inch Topical Q6H    nitroglycerin (NITROSTAT) tablet 0.4 mg  0.4 mg SubLINGual Q5MIN PRN    aspirin chewable tablet 81 mg  81 mg Oral DAILY    carvediloL (COREG) tablet 25 mg  25 mg Oral BID WITH MEALS    folic acid (FOLVITE) tablet 1 mg  1 mg Oral DAILY    gabapentin (NEURONTIN) tablet 600 mg  600 mg Oral TID    lisinopriL (PRINIVIL, ZESTRIL) tablet 20 mg  20 mg Oral DAILY    rosuvastatin (CRESTOR) tablet 40 mg  40 mg Oral DAILY    fish oil-omega-3 fatty acids 340-1,000 mg capsule 1 Cap  1 Cap Oral DAILY    sertraline (ZOLOFT) tablet 50 mg  50 mg Oral DAILY    ticagrelor (BRILINTA) tablet 90 mg  90 mg Oral BID    pantoprazole (PROTONIX) 40 mg in 0.9% sodium chloride 10 mL injection  40 mg IntraVENous DAILY    enoxaparin (LOVENOX) injection 100 mg  100 mg SubCUTAneous Q12H    glucose chewable tablet 16 g  4 Tab Oral PRN    glucagon (GLUCAGEN) injection 1 mg  1 mg IntraMUSCular PRN    dextrose 10% infusion 0-250 mL  0-250 mL IntraVENous PRN    insulin glargine (LANTUS) injection 20 Units  0.2 Units/kg SubCUTAneous DAILY    insulin lispro (HUMALOG) injection 5 Units  0.05 Units/kg SubCUTAneous TID WITH MEALS    insulin lispro (HUMALOG) injection   SubCUTAneous AC&HS    nicotine (NICODERM CQ) 7 mg/24 hr patch 1 Patch  1 Patch TransDERmal DAILY    albuterol (PROVENTIL VENTOLIN) nebulizer solution 2.5 mg  2.5 mg Nebulization Q4H PRN    morphine injection 1 mg  1 mg IntraVENous Q4H PRN        Prior to Admission Medications:  Prior to Admission medications    Medication Sig Start Date End Date Taking? Authorizing Provider   nitroglycerin (NITROSTAT) 0.4 mg SL tablet 1 Tab by SubLINGual route every five (5) minutes as needed for Chest Pain. Up to 3 doses. 12/24/19   Lashonda Candelaria MD   lisinopril (PRINIVIL, ZESTRIL) 20 mg tablet Take 1 Tab by mouth daily. 8/10/18   Bao Masters MD   folic acid (FOLVITE) 1 mg tablet Take  by mouth daily. Luis Alfredo Victor MD   sertraline (ZOLOFT) 100 mg tablet Take 100 mg by mouth daily.     Luis Alfredo Victor MD   ticagrelor (BRILINTA) 90 mg tablet Take 1 Tab by mouth every twelve (12) hours every twelve (12) hours. 2/8/18   Richar Segura MD   ticagrelor (BRILINTA) 90 mg tablet Take 90 mg by mouth two (2) times a day. Provider, Historical   rosuvastatin (CRESTOR) 40 mg tablet Take 40 mg by mouth daily. Provider, Historical   albuterol (PROVENTIL HFA, VENTOLIN HFA, PROAIR HFA) 90 mcg/actuation inhaler Take 2 Puffs by inhalation every four (4) hours as needed for Wheezing. Provider, Historical   omega 3-dha-epa-fish oil (FISH OIL) 100-160-1,000 mg cap Take 1,000 mg by mouth daily. Provider, Historical   alum-mag hydroxide-simeth (MYLANTA) 200-200-20 mg/5 mL susp Take 30 mL by mouth three (3) times daily as needed. Provider, Historical   carvedilol (COREG) 12.5 mg tablet Take 25 mg by mouth two (2) times daily (with meals). Provider, Historical   gabapentin (NEURONTIN) 600 mg tablet Take 600 mg by mouth three (3) times daily. Indications: NEUROPATHIC PAIN    Provider, Historical   insulin glargine (LANTUS) 100 unit/mL injection 22 Units by SubCUTAneous route daily. Indications: takes in the am    Provider, Historical   insulin aspart (NOVOLOG FLEXPEN) 100 unit/mL inpn 14 Units by SubCUTAneous route Before breakfast, lunch, and dinner. Indications: Gestational Diabetes Mellitus    Provider, Historical   ibuprofen (MOTRIN) 400 mg tablet Take 1 tablet by mouth every six (6) hours as needed for Pain. 10/11/14   Deedee Barrientos MD   aspirin 81 mg chewable tablet Take 81 mg by mouth daily. Other, MD Luis Alfredo   nitroglycerin (NITROSTAT) 0.4 mg SL tablet by SubLINGual route every five (5) minutes as needed. Other, MD Luis Alfredo   calcium carbonate (TUMS) 200 mg calcium (500 mg) Chew Take 2 Tabs by mouth as needed.       Provider, Historical        Review of Symptoms:  Except as noted in HPI, patient denies recent fever or chills, nausea, vomiting, diarrhea, hemoptysis, hematemesis, dysuria, myalgias, focal neurologic symptoms, ecchymosis, angioedema, odynophagia, dysphagia, sore throat, earache,rash, melena, hematochezia, depression, GERD, cold intolerance, petechia, bleeding gums, or significant weight loss. A comprehensive review of systems was negative except for that written in the HPI.      Subjective:    24 hr VS reviewed, overall VSSAF  Temp (24hrs), Av °F (36.7 °C), Min:97.8 °F (36.6 °C), Max:98.1 °F (36.7 °C)    Patient Vitals for the past 8 hrs:   Pulse   20 0856 96   20 0723 (!) 107   20 0615 96   20 0600 92   20 0558 95   20 0545 94   20 0530 95   20 0515 94   20 0500 94   20 0445 95   20 0430 96   20 0415 97   20 0400 97   20 0345 99   20 0330 98   20 0315 95   20 0300 98   20 0245 100    Patient Vitals for the past 8 hrs:   Resp   20 0723 18   20 0615 15   20 0600 14   20 0545 10   20 0530 18   20 0515 18   20 0500 18   20 0445 19   20 0430 18   20 0415 20   20 0400 19   20 0345 18   20 0330 19   20 0315 16   20 0300 17   20 0245 19   20 0229 14    Patient Vitals for the past 8 hrs:   BP   20 0856 143/88   20 0723 (!) 152/96   20 0615 125/81   20 0600 (!) 131/94   20 0558 134/86   20 0545 134/86   20 0530 130/86   20 0515 116/74   20 0500 120/71   20 0445 114/76   20 0430 117/60   20 0415 107/70   20 0400 106/61   20 0345 114/71   20 0330 118/68   20 0315 121/62   20 0300 121/63   20 0245 114/60   20 0230 117/72   20 0229 117/72        No intake or output data in the 24 hours ending 20 7729      Physical Exam (complete single organ system exam)      Visit Vitals  /88 (BP 1 Location: Right arm, BP Patient Position: At rest;Supine)   Pulse 96   Temp 98.1 °F (36.7 °C)   Resp 18   Ht 5' 8\" (1.727 m)   Wt 100.1 kg (220 lb 10.9 oz)   SpO2 96%   BMI 33.55 kg/m²     General Appearance:  Well developed, well nourished,alert and oriented x 3, and individual in no acute distress. Ears/Nose/Mouth/Throat:   Hearing grossly normal.         Neck: Supple. Chest:   Lungs clear to auscultation bilaterally. Cardiovascular:  Regular rate and rhythm, S1, S2 normal, no murmur. Abdomen:   Soft, non-tender, bowel sounds are active. Extremities: No edema bilaterally. Skin: Warm and dry. Cardiographics    Telemetry: normal sinus rhythm  ECG: normal EKG, normal sinus rhythm, IWMI, unchanged from previous tracings  Echocardiogram: Not done    Labs:   Recent Results (from the past 24 hour(s))   EKG, 12 LEAD, INITIAL    Collection Time: 03/13/20  2:23 AM   Result Value Ref Range    Ventricular Rate 100 BPM    Atrial Rate 100 BPM    P-R Interval 180 ms    QRS Duration 116 ms    Q-T Interval 360 ms    QTC Calculation (Bezet) 464 ms    Calculated P Axis 61 degrees    Calculated R Axis 85 degrees    Calculated T Axis -38 degrees    Diagnosis       Normal sinus rhythm  Inferior infarct (cited on or before 18-MAR-2017)  When compared with ECG of 23-DEC-2019 01:07,  Inverted T waves have replaced nonspecific T wave abnormality in Inferior   leads  Confirmed by Santa Waller MD, Adelaida Castellanos ([id number]) on 3/13/2020 8:34:06 AM     CBC WITH AUTOMATED DIFF    Collection Time: 03/13/20  2:35 AM   Result Value Ref Range    WBC 7.4 4.1 - 11.1 K/uL    RBC 4.19 4. 10 - 5.70 M/uL    HGB 13.4 12.1 - 17.0 g/dL    HCT 40.3 36.6 - 50.3 %    MCV 96.2 80.0 - 99.0 FL    MCH 32.0 26.0 - 34.0 PG    MCHC 33.3 30.0 - 36.5 g/dL    RDW 13.2 11.5 - 14.5 %    PLATELET 449 341 - 130 K/uL    MPV 10.1 8.9 - 12.9 FL    NRBC 0.0 0  WBC    ABSOLUTE NRBC 0.00 0.00 - 0.01 K/uL    NEUTROPHILS 59 32 - 75 %    LYMPHOCYTES 30 12 - 49 %    MONOCYTES 5 5 - 13 %    EOSINOPHILS 4 0 - 7 %    BASOPHILS 1 0 - 1 %    IMMATURE GRANULOCYTES 1 (H) 0.0 - 0.5 %    ABS. NEUTROPHILS 4.4 1.8 - 8.0 K/UL    ABS. LYMPHOCYTES 2.2 0.8 - 3.5 K/UL    ABS. MONOCYTES 0.3 0.0 - 1.0 K/UL    ABS. EOSINOPHILS 0.3 0.0 - 0.4 K/UL    ABS. BASOPHILS 0.1 0.0 - 0.1 K/UL    ABS. IMM. GRANS. 0.1 (H) 0.00 - 0.04 K/UL    DF AUTOMATED     METABOLIC PANEL, COMPREHENSIVE    Collection Time: 03/13/20  2:35 AM   Result Value Ref Range    Sodium 138 136 - 145 mmol/L    Potassium 3.8 3.5 - 5.1 mmol/L    Chloride 105 97 - 108 mmol/L    CO2 22 21 - 32 mmol/L    Anion gap 11 5 - 15 mmol/L    Glucose 216 (H) 65 - 100 mg/dL    BUN 22 (H) 6 - 20 MG/DL    Creatinine 1.19 0.70 - 1.30 MG/DL    BUN/Creatinine ratio 18 12 - 20      GFR est AA >60 >60 ml/min/1.73m2    GFR est non-AA >60 >60 ml/min/1.73m2    Calcium 9.5 8.5 - 10.1 MG/DL    Bilirubin, total 0.2 0.2 - 1.0 MG/DL    ALT (SGPT) 52 12 - 78 U/L    AST (SGOT) 40 (H) 15 - 37 U/L    Alk. phosphatase 82 45 - 117 U/L    Protein, total 7.4 6.4 - 8.2 g/dL    Albumin 3.5 3.5 - 5.0 g/dL    Globulin 3.9 2.0 - 4.0 g/dL    A-G Ratio 0.9 (L) 1.1 - 2.2     TROPONIN I    Collection Time: 03/13/20  2:35 AM   Result Value Ref Range    Troponin-I, Qt. <0.05 <0.05 ng/mL   SAMPLES BEING HELD    Collection Time: 03/13/20  2:35 AM   Result Value Ref Range    SAMPLES BEING HELD 1RED 1 HYUN     COMMENT        Add-on orders for these samples will be processed based on acceptable specimen integrity and analyte stability, which may vary by analyte. LIPID PANEL    Collection Time: 03/13/20  2:35 AM   Result Value Ref Range    LIPID PROFILE          Cholesterol, total 254 (H) <200 MG/DL    Triglyceride 686 (H) <150 MG/DL    HDL Cholesterol 34 MG/DL    LDL, calculated Not calculated due to elevated triglyceride level 0 - 100 MG/DL    VLDL, calculated  MG/DL     Calculation not valid with this patient's other Lipid values.     CHOL/HDL Ratio 7.5 (H) 0.0 - 5.0     METABOLIC PANEL, BASIC    Collection Time: 03/13/20  2:35 AM   Result Value Ref Range    Sodium 138 136 - 145 mmol/L Potassium 4.0 3.5 - 5.1 mmol/L    Chloride 108 97 - 108 mmol/L    CO2 23 21 - 32 mmol/L    Anion gap 7 5 - 15 mmol/L    Glucose 134 (H) 65 - 100 mg/dL    BUN 21 (H) 6 - 20 MG/DL    Creatinine 1.11 0.70 - 1.30 MG/DL    BUN/Creatinine ratio 19 12 - 20      GFR est AA >60 >60 ml/min/1.73m2    GFR est non-AA >60 >60 ml/min/1.73m2    Calcium 9.1 8.5 - 10.1 MG/DL   PROTHROMBIN TIME + INR    Collection Time: 03/13/20  2:35 AM   Result Value Ref Range    INR 0.9 0.9 - 1.1      Prothrombin time 9.8 9.0 - 11.1 sec   TROPONIN I    Collection Time: 03/13/20  6:02 AM   Result Value Ref Range    Troponin-I, Qt. 0.11 (H) <0.05 ng/mL   SAMPLES BEING HELD    Collection Time: 03/13/20  6:13 AM   Result Value Ref Range    SAMPLES BEING HELD 1RED 1BLU 1LAV     COMMENT        Add-on orders for these samples will be processed based on acceptable specimen integrity and analyte stability, which may vary by analyte.    EKG, 12 LEAD, INITIAL    Collection Time: 03/13/20  8:16 AM   Result Value Ref Range    Ventricular Rate 94 BPM    Atrial Rate 94 BPM    P-R Interval 170 ms    QRS Duration 110 ms    Q-T Interval 378 ms    QTC Calculation (Bezet) 472 ms    Calculated P Axis 55 degrees    Calculated R Axis 86 degrees    Calculated T Axis -55 degrees    Diagnosis       Normal sinus rhythm  Inferior infarct , age undetermined  When compared with ECG of 13-MAR-2020 02:23,  MANUAL COMPARISON REQUIRED, DATA IS UNCONFIRMED     GLUCOSE, POC    Collection Time: 03/13/20  8:42 AM   Result Value Ref Range    Glucose (POC) 159 (H) 65 - 100 mg/dL    Performed by Xu Lomax         Assessment:     Assessment:   CP; sounds like unstable   Elevated troponin; consistent with ischemia  CAD; s/p CABG and stents  Ischemic cardiomyopathy; EF 45%     Plan:   Tele  Add Imdur  Will schedule outpatient cath on Monday  He could go to [facility] in the meantime if he has chest pain    Aline Nieto MD

## 2020-03-13 NOTE — PROGRESS NOTES
TRANSITIONS OF CARE PLAN:   1. DESTINATION: Own Home  2. TRANSPORT: Will need transport assist: [facility]trip [facility]  3. ADDITIONAL SUPPORT: Roommate  4. DME: Glucometer  5. HOME HEALTH: Declined H2H for NSTEMI  6. CODE STATUS/AMD STATUS: Full Code; not on file  7. FOLLOW UP APPOINTMENTS: Anticipated return to [facility] on 3/16 for Cardiac Cath; PCP - [facility]  8. STILL NEEDS: Pain Management issues; Discharge    Reason for Admission:   Chest Pain                   RUR Score:          Not available; RRAT: 9           Plan for utilizing home health:      [facility] for N[facility]I    PCP: First and Last name:  Marita Patel   Name of Practice:    Are you a current patient: Yes/No: YES   Approximate date of last visit: 02/2020                    Current Advanced Directive/Advance Care Plan: Full Code; not on file                         Transition of Care Plan:                  Patient indicated that he is not 100% service connected to the [employer] at this time due to not having a disability claim. Patient's disability has been declined x 2. Patient identified that he hasn't worked since 08/2019. CM discussed that patient should request a psych appointment with the [employer] for follow up and that patient should request additional documentation from cardio with OP Follow Up post planned 3/16 cardiac cath. Patient expressed understanding of the above. CM discussed that due to patient's VA coverage, CM notified The [address] of current admission ([phone]: [phone]. [phone]) and faxed requested documentation ([phone]: [phone]). Due to pending discharge, patient would not be appropriate for transfer to [address] [place] [address] is aware of patient's current admission, plan to discharge today 3/13, and plan to return 3/16 for cardiac cath. Patient has no hx of HH, IPR, or SNF. Pharmacy preference is [facility] [address] [facility]. CM discussed H2H program.  Patient declined [facility] for NSTEMI.   Patient continues with complaints of chest pain; CM notified bedside nursing of the above. Observation notice provided in writing to patient and/or caregiver as well as verbal explanation of the policy. Patients who are in outpatient status also receive the Observation notice. Care Management Interventions  PCP Verified by CM: Yes(Followed by [facility]; last seen in February)  Palliative Care Criteria Met (RRAT>21 & CHF Dx)?: No  Mode of Transport at Discharge:  Other (see comment)(patient will need transport assist)  Transition of Care Consult (CM Consult): Discharge Planning  MyChart Signup: No  Discharge Durable Medical Equipment: No(has dme of: glucometer)  Health Maintenance Reviewed: Yes(cm met with patient, with patient alert and oriented x 4)  Physical Therapy Consult: No  Occupational Therapy Consult: No  Current Support Network: Own Home, Other(lives with a roommate)  Confirm Follow Up Transport: Other (see comment)([facility] or [employer]; independent in ADLs )  The Plan for Transition of Care is Related to the Following Treatment Goals : (declined [facility] for NSTEMI)  The Patient and/or Patient Representative was Provided with a Choice of Provider and Agrees with the Discharge Plan?: Yes  Freedom of Choice List was Provided with Basic Dialogue that Supports the Patient's Individualized Plan of Care/Goals, Treatment Preferences and Shares the Quality Data Associated with the Providers?: Yes   Resource Information Provided?: No  Discharge Location  Discharge Placement: Home with one level(lives with a partially disabled roommate in a first floor apartment, 4 exterior steps, no interior steps)    CRM: Donna Lopez, MPH, [address] [place] [address]; Z: [phone]

## 2020-03-14 VITALS
WEIGHT: 207.89 LBS | HEIGHT: 68 IN | DIASTOLIC BLOOD PRESSURE: 87 MMHG | SYSTOLIC BLOOD PRESSURE: 126 MMHG | OXYGEN SATURATION: 98 % | BODY MASS INDEX: 31.51 KG/M2 | TEMPERATURE: 97.8 F | RESPIRATION RATE: 15 BRPM | HEART RATE: 82 BPM

## 2020-03-14 LAB
EST. AVERAGE GLUCOSE BLD GHB EST-MCNC: 169 MG/DL
GLUCOSE BLD STRIP.AUTO-MCNC: 154 MG/DL (ref 65–100)
GLUCOSE BLD STRIP.AUTO-MCNC: 157 MG/DL (ref 65–100)
HBA1C MFR BLD: 7.5 % (ref 4–5.6)
SERVICE CMNT-IMP: ABNORMAL
SERVICE CMNT-IMP: ABNORMAL
TROPONIN I SERPL-MCNC: 0.17 NG/ML

## 2020-03-14 PROCEDURE — 82962 GLUCOSE BLOOD TEST: CPT

## 2020-03-14 PROCEDURE — 96372 THER/PROPH/DIAG INJ SC/IM: CPT

## 2020-03-14 PROCEDURE — 77010033678 HC OXYGEN DAILY

## 2020-03-14 PROCEDURE — 74011636637 HC RX REV CODE- 636/637: Performed by: INTERNAL MEDICINE

## 2020-03-14 PROCEDURE — 83036 HEMOGLOBIN GLYCOSYLATED A1C: CPT

## 2020-03-14 PROCEDURE — 74011000250 HC RX REV CODE- 250: Performed by: INTERNAL MEDICINE

## 2020-03-14 PROCEDURE — C9113 INJ PANTOPRAZOLE SODIUM, VIA: HCPCS | Performed by: INTERNAL MEDICINE

## 2020-03-14 PROCEDURE — 96376 TX/PRO/DX INJ SAME DRUG ADON: CPT

## 2020-03-14 PROCEDURE — 74011250637 HC RX REV CODE- 250/637: Performed by: INTERNAL MEDICINE

## 2020-03-14 PROCEDURE — 36415 COLL VENOUS BLD VENIPUNCTURE: CPT

## 2020-03-14 PROCEDURE — G0378 HOSPITAL OBSERVATION PER HR: HCPCS

## 2020-03-14 PROCEDURE — 74011250636 HC RX REV CODE- 250/636: Performed by: INTERNAL MEDICINE

## 2020-03-14 PROCEDURE — 84484 ASSAY OF TROPONIN QUANT: CPT

## 2020-03-14 PROCEDURE — 99218 HC RM OBSERVATION: CPT

## 2020-03-14 RX ORDER — MORPHINE SULFATE 2 MG/ML
0.5 INJECTION, SOLUTION INTRAMUSCULAR; INTRAVENOUS
Status: DISCONTINUED | OUTPATIENT
Start: 2020-03-14 | End: 2020-03-14 | Stop reason: HOSPADM

## 2020-03-14 RX ADMIN — SODIUM CHLORIDE 40 MG: 9 INJECTION INTRAMUSCULAR; INTRAVENOUS; SUBCUTANEOUS at 08:33

## 2020-03-14 RX ADMIN — ENOXAPARIN SODIUM 100 MG: 100 INJECTION SUBCUTANEOUS at 07:12

## 2020-03-14 RX ADMIN — METOPROLOL SUCCINATE 25 MG: 25 TABLET, EXTENDED RELEASE ORAL at 08:33

## 2020-03-14 RX ADMIN — FOLIC ACID 1 MG: 1 TABLET ORAL at 08:33

## 2020-03-14 RX ADMIN — MORPHINE SULFATE 1 MG: 2 INJECTION, SOLUTION INTRAMUSCULAR; INTRAVENOUS at 05:40

## 2020-03-14 RX ADMIN — INSULIN GLARGINE 20 UNITS: 100 INJECTION, SOLUTION SUBCUTANEOUS at 08:34

## 2020-03-14 RX ADMIN — Medication 50 MG: at 08:33

## 2020-03-14 RX ADMIN — Medication 10 ML: at 05:41

## 2020-03-14 RX ADMIN — INSULIN LISPRO 5 UNITS: 100 INJECTION, SOLUTION INTRAVENOUS; SUBCUTANEOUS at 08:35

## 2020-03-14 RX ADMIN — SERTRALINE HYDROCHLORIDE 50 MG: 50 TABLET ORAL at 08:33

## 2020-03-14 RX ADMIN — CLOPIDOGREL BISULFATE 75 MG: 75 TABLET ORAL at 08:34

## 2020-03-14 RX ADMIN — LISINOPRIL 2.5 MG: 5 TABLET ORAL at 08:34

## 2020-03-14 RX ADMIN — Medication 2 CAPSULE: at 08:33

## 2020-03-14 RX ADMIN — ASPIRIN 81 MG 81 MG: 81 TABLET ORAL at 08:33

## 2020-03-14 RX ADMIN — ROSUVASTATIN 40 MG: 40 TABLET, FILM COATED ORAL at 08:33

## 2020-03-14 RX ADMIN — NITROGLYCERIN 1 INCH: 20 OINTMENT TOPICAL at 08:34

## 2020-03-14 NOTE — PROGRESS NOTES
Patient being discharged home today and will be going to the [place] for a cardiac cath on Monday. Staff nurse informed cm the patient is already discharged and no care management needs.

## 2020-03-14 NOTE — PROGRESS NOTES
Bedside and Verbal shift change report given to Steve Del Valle (oncoming nurse) by Delmy Harris (offgoing nurse). Report included the following information SBAR, Kardex, ED Summary, OR Summary, Procedure Summary, Intake/Output, MAR, Recent Results, Med Rec Status and Cardiac Rhythm NSR.

## 2020-03-14 NOTE — DISCHARGE SUMMARY
Discharge Summary       PATIENT ID: Franchesca Kelley  MRN: [id number]   YOB: 1970    DATE OF ADMISSION: 3/13/2020  2:19 AM    DATE OF DISCHARGE: 3/14/2020   PRIMARY CARE PROVIDER: None     ATTENDING PHYSICIAN: Gustavo Woodard MD  DISCHARGING PROVIDER: Gustavo Woodard MD    To contact this individual call [phone] and ask the  to page. If unavailable ask to be transferred the Adult Hospitalist Department. CONSULTATIONS: IP CONSULT TO HOSPITALIST  IP CONSULT TO CARDIOLOGY    PROCEDURES/SURGERIES: * No surgery found *    ADMITTING [address] [place] [address] COURSE:   # Chest pain, unstable angina, NSTEMI. LHC on Monday 3/16/20 with Dr. Ashlee Lan. Follow cardiology instructions for LHC procedure on Monday with Dr. Ashlee Lan.    HPI on admission:      Franchesca Kelley is a 51 y.o. male with PMH of HTN, MI, CAD s/p CABG 6 years ago, combined CHFlast TTE 12/2019 with EF 40-45% and stage I DD, DM 2 on insulin; presented to ED for feeling of chest pain for last 5 hours, intermittent off and on, moderate to severe 8/10 at the peak, partly relieved with NTG SL, aggravated with exertion, however started at rest.  Patient has also taken 4 baby aspirin along with about 8 sublingual NTG before arrival to ED. Morphine in the ED has helped some. Not completely pain-free when I evaluated the patient. Initially was about 12/10 but in the later part of the conversation suggested to have more pain and ask for morphine again. Chest pain is similar to his previous heart attacks. Radiation to under the chin/jaw and neck as well. Some nausea but denied vomiting. No diaphoresis. No epigastric pain or heartburns. Uses Tums occasionally maybe once a month. No recent change in medications.     Last TTE and LHC in 12/2019 patient was admitted for NSTEMI. No PCI done at that time. Patient follows with Dr. Ashlee Lan here, however has primary cardiologist at [facility]. Some medication discrepancy in regards to Brilinta versus Plavix and Xarelto. According to patient he is on Plavix and Xarelto, however last DC summary here suggest Brilinta.     In the ED, EKG shows new T wave inversion in 3 and aVF as compared to previous flat ST-T segment. No dynamic ST elevation noted. Initial troponin less than 0.05.     Patient also complained of low back pain since yesterday with radiation of pain both legs and feet. Patient has ran out of his gabapentin for 2 weeks and not taking. Denied any tingling or numbness. No history of trauma. Suggested to have done some housework, but nothing extraordinary. Subjective:      Follow up for chest pain and unstable angina/ NSTEMI  Patient seen and examined at the bedside. Labs, images and notes reviewed  Discussed with nursing staff, orders reviewed. Plan discussed with patient/Family    Pt is doing well. Feeling well. Denied any chest pain, SOB. Was on O2 2LPM NC overnight for comfort per RN and not hypoxia. Used morphin prn as more of scheduled way. Willing and comfortable to go home and wants to be with his significant other. Understands and agreeable to seek medical attention if limiting chest pain, not responsive to NTG at home, to go to [facility] for further urgent eval. Would prefer to go home rather than waiting for LHC here and transfer to [facility]. No other concerns or overnight events. D-dimer negative. Troponin unremarkable. DISCHARGE DIAGNOSES / PLAN:      #Chest pain, unstable angina/ NSTEMI with mildly elevated troponin with bell shaped trending, last troponin down trended, with new EKG changes on admission with T inversion in 3 and aVF, in known patient with CAD s/p CABG  -Admit to observation, ALOS<2 MN, telemetry  -Serial troponins noted. EKG noted. Tele noted. -Cardiology consultDr. Hyun's patient. OK to DC with Imdur with plan for LHC on Monday 3/16/20.  -Continue home ASA 81, Crestor, lisinopril, Coreg, Plavix and Xarelto. -Was already started on Imdur 15mg daily per [facility].  Uptitrate to 30mg daily on DC.  -Nitro SL prn on home  -No opiate for chest pain     #Chronic combined systolic and diastolic HF, compensated, appears euvolemic  -Continue home medications  -Not on any diuretics currently  -Daily weights and I/O's     #Low back pain, with possible radiculopathy versus neuropathy  -X-ray lumbar spine, defer to outpatient as pain resolved completely during the hospital stay. More likely anginal component.  -Continue gabapentin - PCP to take care of further refills for this controlled substance medication     #DM2, poorly controlled  -Last A1c 10.1 in 12/2019  -Continue home Lantus 30 units every morning on DC and defer to PCP f/up for further adjustment as appropriate based on BG log  -Also uses NovoLog 14 units with meals at home, continue     #HTN, controlled  -Continue home medicationsCoreg, lisinopril  -Monitor and adjust as needed     #COPD, known smoker, cutting back  -Albuterol inhaler as needed  -Nicotine patch 7 mg / 24 hours daily per protocol     #HLD  -Continue home Crestor  -Lipid panel     CODE STATUS: Full code  DVT prophylaxis: Lovenox subcu full dose     Emergency contact: Significant other, Guillaume Chris, [phone]     Disposition: Home when clinically ready  Anticipated discharge: ALOS<2 MN  Case discussed with: Patient, nurse, pharmacy     ADDITIONAL CARE RECOMMENDATIONS:   Follow up with Cardiology and PCP at [place] in 1 week after hospital discharge for any further evaluation and post hospital discharge follow up. If any recurrence of the chest pain before Monday 3/16/2020, please go to [facility] for further cardiac evaluation. · It is important that you take the medication exactly as they are prescribed. · Keep your medication in the bottles provided by the pharmacist and keep a list of the medication names, dosages, and times to be taken in your wallet. · Do not take other medications without consulting your doctor.    · No drinking alcohol or driving car or operating machinery if you are on narcotic pain medications. Donot take sedating mediations if you are sleepy or confused. · Fall Precautions  · Keep Well Hydrated  · Report to your medical provider if you feel you have  developed allergies to medications  · Follow up with your PCP or Consultant for medication adjustments and refills  · Monitor for signs of fevers,chills,bleeding,chest pain and seek medical attention if you do so. PENDING TEST RESULTS:   At the time of discharge the following test results are still pending: none    FOLLOW UP APPOINTMENTS:    Follow-up Information     Follow up With Specialties Details Why Vijaya Chance MD Cardiology In 3 days Please come to Cath lab holding area per Instructions from Dr. Edin Gastelum at [facility] per instructions for left heart catheterization [facility]  [phone]      [facility]  In 1 week As needed, If symptoms worsen and post hospital follow up              DIET: Cardiac Diet     ACTIVITY: Activity as tolerated. Please avoid any exertional or strenuous activities until further LHC and completion of cardiac evaluation.     WOUND CARE: na     EQUIPMENT needed: na      DISCHARGE MEDICATIONS:  Current Discharge Medication List      CONTINUE these medications which have CHANGED    Details   isosorbide mononitrate ER (IMDUR) 30 mg tablet Take 1 Tab by mouth daily. Qty: 30 Tab, Refills: 0         CONTINUE these medications which have NOT CHANGED    Details   clopidogreL (Plavix) 75 mg tab Take 75 mg by mouth daily. empagliflozin (Jardiance) 25 mg tablet Take 12.5 mg by mouth daily. fish oil-omega-3 fatty acids (Fish Oil) 340-1,000 mg capsule Take 2 Caps by mouth daily. lisinopriL (PRINIVIL, ZESTRIL) 5 mg tablet Take 2.5 mg by mouth daily. (dose = 0.5 x 5 mg tablet)      metoprolol succinate (TOPROL-XL) 50 mg XL tablet Take 25 mg by mouth daily.  (dose = 0.5 x 50 mg tablet)      multivitamin,tx-iron-ca-min (Thera-M) 27-0.4 mg tab Take 1 Tab by mouth daily. rivaroxaban (Xarelto) 2.5 mg tablet Take 2.5 mg by mouth two (2) times daily (with meals). semaglutide (OZEMPIC) 1 mg/dose (2 mg/1.5 mL) sub-q pen 1 mg by SubCUTAneous route every seven (7) days. thiamine (B-1) 50 mg tablet Take 50 mg by mouth daily. folic acid (FOLVITE) 1 mg tablet Take 1 mg by mouth daily. sertraline (ZOLOFT) 100 mg tablet Take 50 mg by mouth daily. (dose = 0.5 x 100 mg tablet)      rosuvastatin (CRESTOR) 40 mg tablet Take 40 mg by mouth daily. insulin aspart (NOVOLOG FLEXPEN) 100 unit/mL inpn 14 Units by SubCUTAneous route Before breakfast, lunch, and dinner. aspirin 81 mg chewable tablet Take 81 mg by mouth daily. nitroglycerin (NITROSTAT) 0.4 mg SL tablet by SubLINGual route every five (5) minutes as needed. NOTIFY YOUR PHYSICIAN FOR ANY OF THE FOLLOWING:   Fever over 101 degrees for 24 hours. Chest pain, shortness of breath, fever, chills, nausea, vomiting, diarrhea, change in mentation, falling, weakness, bleeding. Severe pain or pain not relieved by medications. Or, any other signs or symptoms that you may have questions about. DISPOSITION:   x Home With:   OT  PT  HH  RN       Long term SNF/Inpatient Rehab    Independent/assisted living    Hospice    Other:       PATIENT CONDITION AT DISCHARGE:     Functional status    Poor     Deconditioned    x Independent      Cognition   x  Lucid     Forgetful     Dementia      Catheters/lines (plus indication)    Stern     PICC     PEG    x None      Code status   x  Full code     DNR      PHYSICAL EXAMINATION AT DISCHARGE:  General:          Alert, cooperative, no distress, appears stated age. HEENT:           Atraumatic, anicteric sclerae, pink conjunctivae                          No oral ulcers, mucosa moist, throat clear, dentition fair  Neck:               Supple, symmetrical  Lungs:             Clear to auscultation bilaterally. No Wheezing or Rhonchi. No rales. Chest wall:      No tenderness  No Accessory muscle use. Heart:              Regular  rhythm,  No  murmur   No edema  Abdomen:        Soft, non-tender. Not distended. Bowel sounds normal  Extremities:     No cyanosis. No clubbing,                            Skin turgor normal, Capillary refill normal  Skin:                Not pale. Not Jaundiced  No rashes   Psych:             Not anxious or agitated. Neurologic:      Alert, moves all extremities, answers questions appropriately and responds to   commands       CHRONIC MEDICAL DIAGNOSES:  Problem List as of 3/14/2020 Date Reviewed: 12/23/2019          Codes Class Noted - Resolved    Chest pain ICD-10-CM: R07.9  ICD-9-CM: 786.50  3/13/2020 - Present        Dehydration ICD-10-CM: E86.0  ICD-9-CM: 276.51  8/8/2018 - Present        Acute chest pain ICD-10-CM: R07.9  ICD-9-CM: 786.50  8/8/2018 - Present        Type 2 diabetes mellitus with hyperglycemia ([facility] ICD-10-CM: E11.65  ICD-9-CM: 250.00  8/8/2018 - Present        NSTEMI (non-ST elevated myocardial infarction) ([facility] ICD-10-CM: I21.4  ICD-9-CM: 410.70  3/18/2017 - Present        Acute non Q wave MI (myocardial infarction), initial episode of care [facility]) ICD-10-CM: I21.4  ICD-9-CM: 410.71  2/23/2012 - Present        Chest pain at rest ICD-10-CM: R07.9  ICD-9-CM: 786.50  2/22/2012 - Present        CAD (coronary artery disease) ICD-10-CM: I25.10  ICD-9-CM: 414.00  2/22/2012 - Present    Overview Signed 2/22/2012  9:09 AM by Paz Tobar MD     [facility] showed [facility]v cad and had bare metal stent to distal rca. Normal lvef. HTN (hypertension) ICD-10-CM: I10  ICD-9-CM: 401.9  2/22/2012 - Present        Hyperlipidemia ICD-10-CM: E78.5  ICD-9-CM: 272.4  2/22/2012 - Present        Unstable angina ([address] ICD-10-CM: I20.0  ICD-9-CM: 411.1  2/22/2012 - Present            Xr Chest Port    Result Date: 3/13/2020  IMPRESSION: No acute findings.     Recent Results (from the past 24 hour(s))   TROPONIN I    Collection Time: 03/13/20  1:49 PM   Result Value Ref Range    Troponin-I, Qt. 0.26 (H) <0.05 ng/mL   GLUCOSE, POC    Collection Time: 03/13/20  1:53 PM   Result Value Ref Range    Glucose (POC) 181 (H) 65 - 100 mg/dL    Performed by Haylee Almazan    GLUCOSE, POC    Collection Time: 03/13/20  4:12 PM   Result Value Ref Range    Glucose (POC) 139 (H) 65 - 100 mg/dL    Performed by Dania MCGEE    TROPONIN I    Collection Time: 03/13/20  5:44 PM   Result Value Ref Range    Troponin-I, Qt. 0.29 (H) <0.05 ng/mL   D DIMER    Collection Time: 03/13/20  7:14 PM   Result Value Ref Range    D-dimer 0.22 0.00 - 0.65 mg/L FEU   GLUCOSE, POC    Collection Time: 03/13/20  9:40 PM   Result Value Ref Range    Glucose (POC) 159 (H) 65 - 100 mg/dL    Performed by Carrington Jacob    HEMOGLOBIN A1C WITH EAG    Collection Time: 03/14/20  5:52 AM   Result Value Ref Range    Hemoglobin A1c 7.5 (H) 4.0 - 5.6 %    Est. average glucose 169 mg/dL   TROPONIN I    Collection Time: 03/14/20  5:52 AM   Result Value Ref Range    Troponin-I, Qt. 0.17 (H) <0.05 ng/mL   GLUCOSE, POC    Collection Time: 03/14/20  8:20 AM   Result Value Ref Range    Glucose (POC) 154 (H) 65 - 100 mg/dL    Performed by Gavin Chambers        Greater than 40 minutes were spent with the patient on counseling and coordination of care    Signed:   Governor Michelle MD  3/14/2020  12:04 PM

## 2020-03-14 NOTE — DISCHARGE INSTRUCTIONS
DISCHARGE SUMMARY from Nurse    PATIENT INSTRUCTIONS:    After general anesthesia or intravenous sedation, for 24 hours or while taking prescription Narcotics:  · Limit your activities  · Do not drive and operate hazardous machinery  · Do not make important personal or business decisions  · Do  not drink alcoholic beverages  · If you have not urinated within 8 hours after discharge, please contact your surgeon on call. Report the following to your surgeon:  · Excessive pain, swelling, redness or odor of or around the surgical area  · Temperature over 100.5  · Nausea and vomiting lasting longer than 4 hours or if unable to take medications  · Any signs of decreased circulation or nerve impairment to extremity: change in color, persistent  numbness, tingling, coldness or increase pain  · Any questions    What to do at Home:  Recommended activity: Activity as tolerated and Activity as tolerated and no driving for today,     If you experience any of the following symptoms , please follow up with . *  Please give a list of your current medications to your Primary Care Provider. *  Please update this list whenever your medications are discontinued, doses are      changed, or new medications (including over-the-counter products) are added. *  Please carry medication information at all times in case of emergency situations. These are general instructions for a healthy lifestyle:    No smoking/ No tobacco products/ Avoid exposure to second hand smoke  Surgeon General's Warning:  Quitting smoking now greatly reduces serious risk to your health.     Obesity, smoking, and sedentary lifestyle greatly increases your risk for illness    A healthy diet, regular physical exercise & weight monitoring are important for maintaining a healthy lifestyle    You may be retaining fluid if you have a history of heart failure or if you experience any of the following symptoms:  Weight gain of 3 pounds or more overnight or 5 pounds in a week, increased swelling in our hands or feet or shortness of breath while lying flat in bed. Please call your doctor as soon as you notice any of these symptoms; do not wait until your next office visit. The discharge information has been reviewed with the patient. The patient verbalized understanding. Discharge medications reviewed with the patient and appropriate educational materials and side effects teaching were provided. ___________________________________________________________________________________________________________________________________Smoking Cessation Program: This is a free, phone/text/email based, smoking cessation program. The program is individualized to meet each patient's needs. To enroll use the link - [employer]. com/quit or text Lorena Salcedo to [phone] from any smart phone. Discharge Instructions       PATIENT ID: Irene Ley  MRN: [id number]   YOB: 1970    DATE OF ADMISSION: 3/13/2020  2:19 AM    DATE OF DISCHARGE: 3/14/2020    PRIMARY CARE PROVIDER: None     ATTENDING PHYSICIAN: Ronen Peter MD  DISCHARGING PROVIDER: Donal Kelly MD    To contact this individual call [phone] and ask the  to page. If unavailable ask to be transferred the Adult Hospitalist Department. DISCHARGE DIAGNOSES   # Chest pain, unstable angina, NSTEMI. LHC on Monday 3/16/20 with Dr. Nan Zuluaga.  Follow cardiology instructions for C procedure on Monday with Dr. Nan Zuluaga.    CONSULTATIONS: Mari Lakhani    PROCEDURES/SURGERIES: * No surgery found *    PENDING TEST RESULTS:   At the time of discharge the following test results are still pending: none    FOLLOW UP APPOINTMENTS:   Follow-up Information     Follow up With Specialties Details Why Natasha Petersen MD Cardiology In 3 days Please come to Cath lab holding area per Instructions from Dr. Nan Zuluaga at [address] [place] [address] per instructions for left heart catheterization [facility] [address]  [phone]      [facility]  In 1 week As needed, If symptoms worsen and post hospital follow up            ADDITIONAL CARE RECOMMENDATIONS:   Follow up with Cardiology and PCP at [place] in 1 week after hospital discharge for any further evaluation and post hospital discharge follow up. If any recurrence of the chest pain before Monday 3/16/2020, please go to [facility] for further cardiac evaluation. · It is important that you take the medication exactly as they are prescribed. · Keep your medication in the bottles provided by the pharmacist and keep a list of the medication names, dosages, and times to be taken in your wallet. · Do not take other medications without consulting your doctor. · No drinking alcohol or driving car or operating machinery if you are on narcotic pain medications. Donot take sedating mediations if you are sleepy or confused. · Fall Precautions  · Keep Well Hydrated  · Report to your medical provider if you feel you have  developed allergies to medications  · Follow up with your PCP or Consultant for medication adjustments and refills  · Monitor for signs of fevers,chills,bleeding,chest pain and seek medical attention if you do so. DIET: Cardiac Diet    ACTIVITY: Activity as tolerated. Please avoid any exertional or strenuous activities until further [facility] and completion of cardiac evaluation. WOUND CARE: na    EQUIPMENT needed: na      DISCHARGE MEDICATIONS:   See Medication Reconciliation Form    · It is important that you take the medication exactly as they are prescribed. · Keep your medication in the bottles provided by the pharmacist and keep a list of the medication names, dosages, and times to be taken in your wallet. · Do not take other medications without consulting your doctor. NOTIFY YOUR PHYSICIAN FOR ANY OF THE FOLLOWING:   Fever over 101 degrees for 24 hours.    Chest pain, shortness of breath, fever, chills, nausea, vomiting, diarrhea, change in mentation, falling, weakness, bleeding. Severe pain or pain not relieved by medications. Or, any other signs or symptoms that you may have questions about. DISPOSITION:  x  Home With:   OT  PT  HH  RN       SNF/Inpatient Rehab/LTAC    Independent/assisted living    Hospice    Other:     CDMP Checked:   Yes x     PROBLEM LIST Updated:  Yes x        My Medications      CHANGE how you take these medications      Instructions Each Dose to Equal Morning Noon Evening Bedtime   isosorbide mononitrate ER 30 mg tablet  Commonly known as:  IMDUR  What changed:  how much to take      Take 1 Tab by mouth daily. 30 mg            CONTINUE taking these medications      Instructions Each Dose to Equal Morning Noon Evening Bedtime   aspirin 81 mg chewable tablet      Take 81 mg by mouth daily. 81 mg         Fish Oil 340-1,000 mg capsule  Generic drug:  fish oil-omega-3 fatty acids      Take 2 Caps by mouth daily. 2 Cap         folic acid 1 mg tablet  Commonly known as:  FOLVITE      Take 1 mg by mouth daily. 1 mg         Jardiance 25 mg tablet  Generic drug:  empagliflozin      Take 12.5 mg by mouth daily. 12.5 mg         lisinopriL 5 mg tablet  Commonly known as:  PRINIVIL, ZESTRIL      Take 2.5 mg by mouth daily. (dose = 0.5 x 5 mg tablet)   2.5 mg         metoprolol succinate 50 mg XL tablet  Commonly known as:  TOPROL-XL      Take 25 mg by mouth daily. (dose = 0.5 x 50 mg tablet)   25 mg         nitroglycerin 0.4 mg SL tablet  Commonly known as:  NITROSTAT      by SubLINGual route every five (5) minutes as needed. NovoLOG Flexpen U-100 Insulin 100 unit/mL (3 mL) Inpn  Generic drug:  insulin aspart U-100      14 Units by SubCUTAneous route Before breakfast, lunch, and dinner. 14 Units         Plavix 75 mg Tab  Generic drug:  clopidogreL      Take 75 mg by mouth daily.    75 mg         rosuvastatin 40 mg tablet  Commonly known as:  CRESTOR      Take 40 mg by mouth daily. 40 mg         semaglutide 1 mg/dose (2 mg/1.5 mL) sub-q pen  Commonly known as:  OZEMPIC      1 mg by SubCUTAneous route every seven (7) days. 1 mg         sertraline 100 mg tablet  Commonly known as:  ZOLOFT      Take 50 mg by mouth daily. (dose = 0.5 x 100 mg tablet)   50 mg         Thera-M 27-0.4 mg Tab  Generic drug:  multivitamin,tx-iron-ca-min      Take 1 Tab by mouth daily. 1 Tab         thiamine 50 mg tablet  Commonly known as:  B-1      Take 50 mg by mouth daily. 50 mg         Xarelto 2.5 mg tablet  Generic drug:  rivaroxaban      Take 2.5 mg by mouth two (2) times daily (with meals). 2.5 mg               Where to Get Your Medications      Information on where to get these meds will be given to you by the nurse or doctor.     Ask your nurse or doctor about these medications  · isosorbide mononitrate ER 30 mg tablet         Signed:   Magaly Gonzalez MD  3/14/2020  11:59 AM

## 2020-03-16 ENCOUNTER — HOSPITAL ENCOUNTER (OUTPATIENT)
Age: 50
Setting detail: OUTPATIENT SURGERY
Discharge: HOME | End: 2020-03-16
Attending: INTERNAL MEDICINE | Admitting: INTERNAL MEDICINE
Payer: MEDICAID

## 2020-03-16 VITALS
BODY MASS INDEX: 31.83 KG/M2 | HEART RATE: 86 BPM | RESPIRATION RATE: 20 BRPM | SYSTOLIC BLOOD PRESSURE: 128 MMHG | WEIGHT: 210 LBS | TEMPERATURE: 97.4 F | DIASTOLIC BLOOD PRESSURE: 71 MMHG | OXYGEN SATURATION: 99 % | HEIGHT: 68 IN

## 2020-03-16 DIAGNOSIS — I20.0 UNSTABLE ANGINA (HCC): ICD-10-CM

## 2020-03-16 LAB
GLUCOSE BLD STRIP.AUTO-MCNC: 128 MG/DL (ref 65–100)
GLUCOSE BLD STRIP.AUTO-MCNC: 97 MG/DL (ref 65–100)
SERVICE CMNT-IMP: ABNORMAL
SERVICE CMNT-IMP: NORMAL

## 2020-03-16 PROCEDURE — 82962 GLUCOSE BLOOD TEST: CPT

## 2020-03-16 PROCEDURE — 92928 PRQ TCAT PLMT NTRAC ST 1 LES: CPT | Performed by: INTERNAL MEDICINE

## 2020-03-16 PROCEDURE — C1887 CATHETER, GUIDING: HCPCS | Performed by: INTERNAL MEDICINE

## 2020-03-16 PROCEDURE — C1760 CLOSURE DEV, VASC: HCPCS | Performed by: INTERNAL MEDICINE

## 2020-03-16 PROCEDURE — 74011000258 HC RX REV CODE- 258: Performed by: INTERNAL MEDICINE

## 2020-03-16 PROCEDURE — 99153 MOD SED SAME PHYS/QHP EA: CPT | Performed by: INTERNAL MEDICINE

## 2020-03-16 PROCEDURE — C1725 CATH, TRANSLUMIN NON-LASER: HCPCS | Performed by: INTERNAL MEDICINE

## 2020-03-16 PROCEDURE — 93459 L HRT ART/GRFT ANGIO: CPT | Performed by: INTERNAL MEDICINE

## 2020-03-16 PROCEDURE — C1874 STENT, COATED/COV W/DEL SYS: HCPCS | Performed by: INTERNAL MEDICINE

## 2020-03-16 PROCEDURE — 77030004538 HC CATH ANGI DX MP BSC -A: Performed by: INTERNAL MEDICINE

## 2020-03-16 PROCEDURE — 77030013744: Performed by: INTERNAL MEDICINE

## 2020-03-16 PROCEDURE — 99152 MOD SED SAME PHYS/QHP 5/>YRS: CPT | Performed by: INTERNAL MEDICINE

## 2020-03-16 PROCEDURE — C1894 INTRO/SHEATH, NON-LASER: HCPCS | Performed by: INTERNAL MEDICINE

## 2020-03-16 PROCEDURE — 93041 RHYTHM ECG TRACING: CPT

## 2020-03-16 PROCEDURE — C1769 GUIDE WIRE: HCPCS | Performed by: INTERNAL MEDICINE

## 2020-03-16 PROCEDURE — 74011000250 HC RX REV CODE- 250: Performed by: INTERNAL MEDICINE

## 2020-03-16 PROCEDURE — 74011636320 HC RX REV CODE- 636/320: Performed by: INTERNAL MEDICINE

## 2020-03-16 PROCEDURE — 74011250636 HC RX REV CODE- 250/636: Performed by: INTERNAL MEDICINE

## 2020-03-16 PROCEDURE — 74011250637 HC RX REV CODE- 250/637: Performed by: INTERNAL MEDICINE

## 2020-03-16 DEVICE — STENT RONYX30012W RESOLUTE ONYX 3.00X12
Type: IMPLANTABLE DEVICE | Status: FUNCTIONAL
Brand: RESOLUTE ONYX™

## 2020-03-16 DEVICE — STENT RONYX35030W RESOLUTE ONYX 3.50X30
Type: IMPLANTABLE DEVICE | Status: FUNCTIONAL
Brand: RESOLUTE ONYX™

## 2020-03-16 RX ORDER — INSULIN GLARGINE 100 [IU]/ML
30 INJECTION, SOLUTION SUBCUTANEOUS DAILY
COMMUNITY

## 2020-03-16 RX ORDER — SODIUM CHLORIDE 9 MG/ML
1.5 INJECTION, SOLUTION INTRAVENOUS CONTINUOUS
Status: DISPENSED | OUTPATIENT
Start: 2020-03-16 | End: 2020-03-16

## 2020-03-16 RX ORDER — LIDOCAINE HYDROCHLORIDE 10 MG/ML
INJECTION INFILTRATION; PERINEURAL AS NEEDED
Status: DISCONTINUED | OUTPATIENT
Start: 2020-03-16 | End: 2020-03-16 | Stop reason: HOSPADM

## 2020-03-16 RX ORDER — FENTANYL CITRATE 50 UG/ML
INJECTION, SOLUTION INTRAMUSCULAR; INTRAVENOUS AS NEEDED
Status: DISCONTINUED | OUTPATIENT
Start: 2020-03-16 | End: 2020-03-16 | Stop reason: HOSPADM

## 2020-03-16 RX ORDER — NITROGLYCERIN 0.4 MG/1
0.4 TABLET SUBLINGUAL AS NEEDED
Status: DISCONTINUED | OUTPATIENT
Start: 2020-03-16 | End: 2020-03-16 | Stop reason: HOSPADM

## 2020-03-16 RX ORDER — SODIUM CHLORIDE 9 MG/ML
3 INJECTION, SOLUTION INTRAVENOUS CONTINUOUS
Status: DISPENSED | OUTPATIENT
Start: 2020-03-16 | End: 2020-03-16

## 2020-03-16 RX ORDER — MIDAZOLAM HYDROCHLORIDE 1 MG/ML
INJECTION, SOLUTION INTRAMUSCULAR; INTRAVENOUS AS NEEDED
Status: DISCONTINUED | OUTPATIENT
Start: 2020-03-16 | End: 2020-03-16 | Stop reason: HOSPADM

## 2020-03-16 RX ORDER — ACETAMINOPHEN 325 MG/1
650 TABLET ORAL
Status: DISCONTINUED | OUTPATIENT
Start: 2020-03-16 | End: 2020-03-16 | Stop reason: HOSPADM

## 2020-03-16 RX ADMIN — SODIUM CHLORIDE 3 ML/KG/HR: 900 INJECTION, SOLUTION INTRAVENOUS at 11:45

## 2020-03-16 RX ADMIN — SODIUM CHLORIDE 1.5 ML/KG/HR: 900 INJECTION, SOLUTION INTRAVENOUS at 12:45

## 2020-03-16 RX ADMIN — ACETAMINOPHEN 650 MG: 325 TABLET ORAL at 16:11

## 2020-03-16 NOTE — Clinical Note
Lesion located in the Ostium Cx Ostium OM 1. Balloon inserted. Balloon inflated using multiple inflations inflation technique. Lesion #1: Pressure = 14 ute; Duration = 180 sec. Inflation 2: Pressure = 18 ute; Duration = 120 sec.

## 2020-03-16 NOTE — PROGRESS NOTES
Tolerated crackers and drink. preferes to take his box lunch home with him. 1715 ambulated 100 ft, gait steady, voided. Back to stretcher right groin cath site soft, but pt c/o tenderness reminded able to take tylenol @ 830 pm tonight if needed . Assisted with dressing. Reviewed discharge instructions with pt, teach back method used. Pt appears very anxious to get \"out of hospital\"  Reminded multiply times on activity of right leg and cath site.

## 2020-03-16 NOTE — PROGRESS NOTES
1300 -   Cardiac Cath Lab Procedure Area Arrival Note:    Denis Escudero arrived to Cardiac Cath Lab, Procedure Area. Patient identifiers verified with NAME and DATE OF BIRTH. Procedure verified with patient. Consent forms verified. Allergies verified. Patient informed of procedure and plan of care. Questions answered with review. Patient voiced understanding of procedure and plan of care. Patient on cardiac monitor, non-invasive blood pressure, SPO2 monitor. Placed on O2 @ 2 lpm via nc. IV of NS on pump at 143 ml/hr. Patient status doing well without problems. Patient is A&Ox 4. Patient reports no discomfort at this time. Patient medicated during procedure with orders obtained and verified by Dr. Amanuel Sky. Refer to patients Cardiac Cath Lab PROCEDURE REPORT for vital signs, assessment, status, and response during procedure, printed at end of case. Printed report on chart or scanned into chart. 5739 - TRANSFER - OUT REPORT:    Verbal report given to Sawyer DOYLE(name) on Denis Escudero  being transferred to (unit) for routine post - op       Report consisted of patients Situation, Background, Assessment and   Recommendations(SBAR). Information from the following report(s) Procedure Summary and MAR was reviewed with the receiving nurse. Lines:   Peripheral IV 03/16/20 Left Forearm (Active)        Opportunity for questions and clarification was provided.       Patient transported with:   Registered Nurse

## 2020-03-16 NOTE — PROGRESS NOTES
B/S 128, pt took his insulin, full dose this am before coming to hospital  Given 200 ml of apple juice 24 carbs, pt is scheduled for 115  Dr Mekhi Sam aware

## 2020-03-16 NOTE — DISCHARGE INSTRUCTIONS
[email or web address]    Patient Discharge Instructions    Sherrill Cabrera / [id number] : 1970    Admitted 3/16/2020 Discharged: 3/16/2020       · It is important that you take the medication exactly as they are prescribed. · Keep your medication in the bottles provided by the pharmacist and keep a list of the medication names, dosages, and times to be taken in your wallet. · Do not take other medications without consulting your doctor. BRING ALL OF YOUR MEDICINES TO YOUR OFFICE VISIT with Dr. Rian Loredo with Amparo Ley MD in 2 week      Cardiac Catheterization  Discharge Instructions     Do not drive, operate any machinery, or sign any legal documents for 24 hours after your procedure. You must have someone to drive you home.  You may take a shower 24 hours after your cardiac catheterization. Be sure to get the dressing wet and then remove it; gently wash the area with warm soapy water. Pat dry and leave open to air. To help prevent infections, be sure to keep the cath site clean and dry. No lotions, creams, powders, ointments, etc. in the cath site for approximately 1 week.  Do not take a tub bath, get in a hot tub or swimming pool for approximately 5 days or until the cath site is completely healed.  No strenuous activity or heavy lifting over 10 lbs. for 7 days.  Drink plenty of fluids for 24-48 hours after your cath to flush the contrast dye from your kidneys. No alcoholic beverages for 24 hours. You may resume your previous diet (low fat, low cholesterol) after your cath.  After your cath, some bruising or discomfort is common during the healing process. Tylenol, 1-2 tablets every 6 hours as needed, is recommended if you experience any discomfort.   If you experience any signs or symptoms of infection such as fever, chills, or poorly healing incision, persistent tenderness or swelling in the groin, redness and/or warmth to the touch, numbness, significant tingling or pain at the groin site or affected extremity, rash, drainage from the cath site, or if the leg feels tight or swollen, call your physician right away.  If bleeding at the cath site occurs, take a clean gauze pad and apply direct pressure to the groin just above the puncture site. Call 911 immediately, and continue to apply direct pressure until an ambulance gets to your location.  You may return to work  2  days after your cardiac cath if no groin bleeding. Information obtained by :  I understand that if any problems occur once I am at home I am to contact my physician. I understand and acknowledge receipt of the instructions indicated above.                                                                                                                                            R.N.'s Signature                                                                  Date/Time                                                                                                                                              Patient or Representative Signature                                                          Date/Time      Delio Cobb MD

## 2020-03-16 NOTE — PROGRESS NOTES
Cardiac Cath Lab Recovery Arrival Note:      Josh Thomson arrived to Cardiac Cath Lab, Recovery Area. Staff introduced to patient. Patient identifiers verified with NAME and DATE OF BIRTH. Procedure verified with patient. Consent forms reviewed and signed by patient or authorized representative and verified. Allergies verified. Patient and family oriented to department. Patient and family informed of procedure and plan of care. Questions answered with review. Patient prepped for procedure, per orders from physician, prior to arrival.    Patient on cardiac monitor, non-invasive blood pressure, SPO2 monitor. On room air. Patient is A&Ox 4. Patient reports no complaints of pain  States SOB, was in the hospital over night this past wk end. Patient in stretcher, in low position, with side rails up, call bell within reach, patient instructed to call if assistance as needed. Patient prep in: [address], [place] [address]. Patient family has pager # [phone]  Family in: outside hospital pt will uber home. Prep by: Markos Flores RN    Pre cath teaching completed.

## 2020-03-16 NOTE — Clinical Note
Lesion located in the Ostium Cx. Balloon inflated using multiple inflations inflation technique. Lesion #1: Pressure = 15 ute; Duration = 40 sec. Inflation 2: Pressure = 18 ute; Duration = 27 sec.

## 2020-03-16 NOTE — PROGRESS NOTES
freq reminding pt not to try and sit up, need to ly flat. 1545 tolerated liquid    1611 tylenol for c/o right groin pain. Dr Mekhi Sam aware, site checked, no change    1630 pt sleeping.

## 2020-03-16 NOTE — Clinical Note
Lesion located in the Proximal Cx. Balloon inserted. Balloon inflated using multiple inflations inflation technique. Lesion #1: Pressure = 10 ute; Duration = 10 sec. Inflation 2: Pressure = 10 ute; Duration = 6 sec. Inflation 3: Pressure = 14 ute; Duration = 21 sec. Inflation 4: Pressure = 10 ute; Duration = 20 sec.

## 2020-03-16 NOTE — PROGRESS NOTES
Reminded to stop smoking, and control of B/S  Need to get Rx fro N[facility] Papa filled and continue it starting tomorrow, pt acknowledged understanding  Discharged via w/c with uber, instructions, and belongings with pt.

## 2020-03-16 NOTE — PROGRESS NOTES
TRANSFER - IN REPORT:    Verbal report received from [facility] RN on Hugo Hartmann  being received from procedure for routine progression of care. Report consisted of patients Situation, Background, Assessment and Recommendations(SBAR). Information from the following report(s) Procedure Summary, MAR, Recent Results and Med Rec Status was reviewed with the receiving clinician. Opportunity for questions and clarification was provided. Assessment completed upon patients arrival to [address] [place] [id number] and care assumed. Cardiac Cath Lab Recovery Arrival Note:    Hugo Hartmann arrived to [facility] recovery area. Patient procedure= LHC. Patient on cardiac monitor, non-invasive blood pressure, SPO2 monitor. On room air. IV  of nacl on pump at 25 ml/hr. Patient status doing well without problems. Patient is A&Ox 4. Patient reports no complaints. PROCEDURE SITE CHECK:    Procedure site:without any bleeding and or hematoma, no pain/discomfort reported at procedure site. No change in patient status. Continue to monitor patient and status. Dr Krystal Finley talked with pt.  And called friend on phone

## 2020-03-16 NOTE — Clinical Note
Lesion: Located in the Ostium OM 1. Stent inserted. Multiple inflations used. First inflation pressure = 9 ute; inflation time: 20 sec. Second inflation pressure: 10 ute; inflation time: 20 sec.

## 2020-03-16 NOTE — CARDIO/PULMONARY
Cardiac Rehab: Pt is s/p stents today. He has been referred to cardiac rehab at the [place] December 2019 per his request following MI. [facility].

## 2020-03-16 NOTE — Clinical Note
Lesion located in the Ostium OM 1. Balloon inserted. Balloon inflated using multiple inflations inflation technique. Lesion #1: Pressure = 15 ute; Duration = 60 sec. Inflation 2: Pressure = 15 ute; Duration = 60 sec.

## 2020-03-16 NOTE — Clinical Note
Lesion: Located in the Proximal Cx. Stent inserted. First inflation pressure = 26 ute; inflation time: 26 sec.

## 2020-03-17 LAB
ATRIAL RATE: 73 BPM
CALCULATED P AXIS, ECG09: 58 DEGREES
CALCULATED R AXIS, ECG10: 75 DEGREES
CALCULATED T AXIS, ECG11: 65 DEGREES
DIAGNOSIS, 93000: NORMAL
P-R INTERVAL, ECG05: 172 MS
Q-T INTERVAL, ECG07: 440 MS
QRS DURATION, ECG06: 116 MS
QTC CALCULATION (BEZET), ECG08: 484 MS
VENTRICULAR RATE, ECG03: 73 BPM

## 2020-12-28 ENCOUNTER — HOSPITAL ENCOUNTER (EMERGENCY)
Age: 50
Discharge: HOME | End: 2020-12-28
Attending: EMERGENCY MEDICINE
Payer: MEDICAID

## 2020-12-28 ENCOUNTER — APPOINTMENT (OUTPATIENT)
Dept: GENERAL RADIOLOGY | Age: 50
End: 2020-12-28
Attending: EMERGENCY MEDICINE
Payer: MEDICAID

## 2020-12-28 VITALS
RESPIRATION RATE: 16 BRPM | SYSTOLIC BLOOD PRESSURE: 146 MMHG | HEART RATE: 90 BPM | TEMPERATURE: 98.4 F | DIASTOLIC BLOOD PRESSURE: 86 MMHG | OXYGEN SATURATION: 98 %

## 2020-12-28 DIAGNOSIS — M79.642 LEFT HAND PAIN: Primary | ICD-10-CM

## 2020-12-28 PROCEDURE — 99283 EMERGENCY DEPT VISIT LOW MDM: CPT

## 2020-12-28 PROCEDURE — 73130 X-RAY EXAM OF HAND: CPT

## 2020-12-28 PROCEDURE — 74011250637 HC RX REV CODE- 250/637: Performed by: PHYSICIAN ASSISTANT

## 2020-12-28 RX ORDER — HYDROCODONE BITARTRATE AND ACETAMINOPHEN 5; 325 MG/1; MG/1
1 TABLET ORAL
Status: COMPLETED | OUTPATIENT
Start: 2020-12-28 | End: 2020-12-28

## 2020-12-28 RX ORDER — HYDROCODONE BITARTRATE AND ACETAMINOPHEN 5; 325 MG/1; MG/1
1 TABLET ORAL
Qty: 12 TAB | Refills: 0 | Status: SHIPPED | OUTPATIENT
Start: 2020-12-28 | End: 2020-12-31

## 2020-12-28 RX ADMIN — HYDROCODONE BITARTRATE AND ACETAMINOPHEN 1 TABLET: 5; 325 TABLET ORAL at 22:54

## 2020-12-29 NOTE — ED PROVIDER NOTES
Date of Service:  2020    Patient:  George Garcia    Chief Complaint:  Hand Pain and Hand Injury       HPI:  George Garcia is a 53 y.o.  male who presents for evaluation of left hand pain. Patient lost his balance while carrying groceries into the house, tripped over a curb. Rivera Dings onto left hand, specifically on middle and fourth finger. Took tylenol without improvement. No head trauma, no loss of consciousness. Denies any additional injuries or concerns.                Past Medical History:   Diagnosis Date    CAD (coronary artery disease)     Diabetes ([facility] [address]     \"prediabetes\" Was suppos to go clinic at  [facility] [address] Endocarditis     History of cardiac cath     Hypertension     MI (myocardial infarction) ([facility] [address]        Past Surgical History:   Procedure Laterality Date    CARDIAC SURG PROCEDURE UNLIST      cath with stent placement 2012    CARDIAC SURG PROCEDURE UNLIST      CABGx4         Family History:   Problem Relation Age of Onset    Stroke Father        Social History     Socioeconomic History    Marital status: SINGLE     Spouse name: Not on file    Number of children: Not on file    Years of education: Not on file    Highest education level: Not on file   Occupational History    Not on file   Social Needs    Financial resource strain: Not on file    Food insecurity     Worry: Not on file     Inability: Not on file    Transportation needs     Medical: Not on file     Non-medical: Not on file   Tobacco Use    Smoking status: Current Every Day Smoker     Packs/day: 0.25     Last attempt to quit: 2012     Years since quittin.9    Smokeless tobacco: Never Used   Substance and Sexual Activity    Alcohol use: Yes     Comment: occassionl    Drug use: Yes     Types: Marijuana     Comment: last used two weeks ago     Sexual activity: Not Currently     Partners: Female   Lifestyle    Physical activity     Days per week: Not on file     Minutes per session: Not on file    Stress: Not on file   Relationships    Social connections     Talks on phone: Not on file     Gets together: Not on file     Attends Gnosticist service: Not on file     Active member of club or organization: Not on file     Attends meetings of clubs or organizations: Not on file     Relationship status: Not on file    Intimate partner violence     Fear of current or ex partner: Not on file     Emotionally abused: Not on file     Physically abused: Not on file     Forced sexual activity: Not on file   Other Topics Concern    Not on file   Social History Narrative    Not on file         ALLERGIES: Patient has no known allergies. Review of Systems   Constitutional: Negative for chills and fever. HENT: Negative for trouble swallowing. Eyes: Negative for redness. Respiratory: Negative for shortness of breath. Cardiovascular: Negative for chest pain. Gastrointestinal: Negative for abdominal pain, diarrhea, nausea and vomiting. Genitourinary: Negative for dysuria. Musculoskeletal: Positive for arthralgias. Negative for gait problem. Skin: Negative for rash. Neurological: Negative for syncope. Vitals:    12/28/20 2137   BP: (!) 146/86   Pulse: 90   Resp: 16   Temp: 98.4 °F (36.9 °C)   SpO2: 98%            Physical Exam  Vitals signs and nursing note reviewed. Constitutional:       Appearance: Normal appearance. HENT:      Head: Normocephalic and atraumatic. Nose: Nose normal.   Eyes:      Extraocular Movements: Extraocular movements intact. Conjunctiva/sclera: Conjunctivae normal.      Pupils: Pupils are equal, round, and reactive to light. Neck:      Musculoskeletal: Normal range of motion and neck supple. Cardiovascular:      Rate and Rhythm: Normal rate and regular rhythm. Pulses: Normal pulses. Radial pulses are 2+ on the right side and 2+ on the left side. Posterior tibial pulses are 2+ on the right side and 2+ on the left side.       Heart sounds: Normal heart sounds. Pulmonary:      Effort: Pulmonary effort is normal.      Breath sounds: Normal breath sounds. Abdominal:      General: Abdomen is flat. Bowel sounds are normal.      Palpations: Abdomen is soft. Tenderness: There is no abdominal tenderness. There is no right CVA tenderness, left CVA tenderness, guarding or rebound. Musculoskeletal: Normal range of motion. Left wrist: He exhibits tenderness and bony tenderness. He exhibits normal range of motion, no swelling and no effusion. Comments: Snuffbox tenderness to palpation on left   Skin:     General: Skin is warm and dry. Neurological:      General: No focal deficit present. Mental Status: He is alert and oriented to person, place, and time. Mental status is at baseline. Psychiatric:         Mood and Affect: Mood normal.         Behavior: Behavior normal.         Thought Content: Thought content normal.          MDM  Number of Diagnoses or Management Options  Left hand pain  Diagnosis management comments: IMAGING:  XR HAND LT MIN 3 V   Final Result    IMPRESSION: No acute abnormality. Medications During Visit:  Medications  HYDROcodone-acetaminophen (NORCO) 5-325 mg per tablet 1 Tab (1 Tab Oral Given 12/28/20 5201)      DECISION MAKING:  Roz Fuentes is a 53 y.o. male who comes in as above. Presents today after falling onto the left hand, no head trauma, no loss of consciousness. No additional injuries or concerns. Vital stable, patient in no acute distress. Tenderness palpation to left snuffbox. X-ray shows no acute fracture or abnormality. Patient placed in thumb spica, please follow-up with orthopedics within 1 week. Return to ED if any worsening severe symptoms. Patient management discussed with attending physician.       IMPRESSION:  Left hand pain  (primary encounter diagnosis)    DISPOSITION:  Discharged      Discharge Medication List as of 12/28/2020 10:35 PM    START taking these medications    HYDROcodone-acetaminophen (Norco) 5-325 mg per tablet  Take 1 Tab by mouth every six (6) hours as needed for Pain for up to 3 days. Max Daily Amount: 4 Tabs., Print, Disp-12 Tab, R-0      CONTINUE these medications which have NOT CHANGED    insulin glargine (Lantus U-100 Insulin) 100 unit/mL injection  30 Units by SubCUTAneous route daily. , Historical Med    ticagrelor (Brilinta) 90 mg tablet  Take 1 Tab by mouth two (2) times a day., Print, Disp-60 Tab, R-5    empagliflozin (Jardiance) 25 mg tablet  Take 12.5 mg by mouth daily. , Historical Med    fish oil-omega-3 fatty acids (Fish Oil) 340-1,000 mg capsule  Take 2 Caps by mouth daily. , Historical Med    lisinopriL (PRINIVIL, ZESTRIL) 5 mg tablet  Take 2.5 mg by mouth daily. (dose = 0.5 x 5 mg tablet), Historical Med    metoprolol succinate (TOPROL-XL) 50 mg XL tablet  Take 25 mg by mouth daily. (dose = 0.5 x 50 mg tablet), Historical Med    multivitamin,tx-iron-ca-min (Thera-M) 27-0.4 mg tab  Take 1 Tab by mouth daily. , Historical Med    rivaroxaban (Xarelto) 2.5 mg tablet  Take 2.5 mg by mouth two (2) times daily (with meals). , Historical Med    semaglutide (OZEMPIC) 1 mg/dose (2 mg/1.5 mL) sub-q pen  1 mg by SubCUTAneous route every seven (7) days. , Historical Med    thiamine (B-1) 50 mg tablet  Take 50 mg by mouth daily. , Historical Med    isosorbide mononitrate ER (IMDUR) 30 mg tablet  Take 1 Tab by mouth daily. , Print, LPMK-09 Tab, R-0    folic acid (FOLVITE) 1 mg tablet  Take 1 mg by mouth daily. , Historical Med    sertraline (ZOLOFT) 100 mg tablet  Take 50 mg by mouth daily. (dose = 0.5 x 100 mg tablet), Historical Med    rosuvastatin (CRESTOR) 40 mg tablet  Take 40 mg by mouth daily. , Historical Med    insulin aspart (NOVOLOG FLEXPEN) 100 unit/mL inpn  18 Units by SubCUTAneous route Before breakfast, lunch, and dinner., Historical Med    aspirin 81 mg chewable tablet  Take 81 mg by mouth daily.   Historical Med, 81 mg    nitroglycerin (NITROSTAT) 0.4 mg SL tablet  by SubLINGual route every five (5) minutes as needed.  , Historical Med           Follow-up Information     Follow up With Specialties Details Why Contact Info    [facility]  Schedule an appointment as soon as possible for a visit in 1   week  [address] [place] [id number]  [phone]    [facility] [address] [facility] Emergency Medicine Go to  If symptoms worsen [address] [address]  [phone]          The patient is asked to follow-up with their primary care provider in the next several days. They are to call tomorrow for an appointment. The patient is asked to return promptly for any increased concerns or worsening of symptoms. They can return to this emergency department or any other emergency department  .          Procedures

## 2020-12-29 NOTE — ED TRIAGE NOTES
Patient arrives ambulatory from home with CC of left hand and wrist pain. Patient tripped and fell, landing on his left hand. +swelling. PVS intact.  Pt took 2 tylenol and gabapentin around 8pm.

## 2021-06-13 ENCOUNTER — HOSPITAL ENCOUNTER (EMERGENCY)
Age: 51
Discharge: HOME | End: 2021-06-13
Attending: EMERGENCY MEDICINE
Payer: MEDICARE

## 2021-06-13 ENCOUNTER — APPOINTMENT (OUTPATIENT)
Dept: GENERAL RADIOLOGY | Age: 51
End: 2021-06-13
Attending: EMERGENCY MEDICINE
Payer: MEDICARE

## 2021-06-13 VITALS
TEMPERATURE: 98.4 F | RESPIRATION RATE: 20 BRPM | DIASTOLIC BLOOD PRESSURE: 91 MMHG | SYSTOLIC BLOOD PRESSURE: 160 MMHG | OXYGEN SATURATION: 98 % | HEART RATE: 99 BPM

## 2021-06-13 DIAGNOSIS — S62.232A CLOSED DISPLACED FRACTURE OF BASE OF FIRST METACARPAL BONE OF LEFT HAND, UNSPECIFIED FRACTURE MORPHOLOGY, INITIAL ENCOUNTER: Primary | ICD-10-CM

## 2021-06-13 PROCEDURE — 99282 EMERGENCY DEPT VISIT SF MDM: CPT

## 2021-06-13 PROCEDURE — 73130 X-RAY EXAM OF HAND: CPT

## 2021-06-13 PROCEDURE — 75810000053 HC SPLINT APPLICATION

## 2021-06-13 RX ORDER — OXYCODONE HYDROCHLORIDE 5 MG/1
5 TABLET ORAL
Qty: 9 TABLET | Refills: 0 | Status: SHIPPED | OUTPATIENT
Start: 2021-06-13 | End: 2021-06-16

## 2021-06-13 RX ORDER — TRAMADOL HYDROCHLORIDE 50 MG/1
50 TABLET ORAL
Qty: 6 TABLET | Refills: 0 | Status: SHIPPED
Start: 2021-06-13 | End: 2021-06-13 | Stop reason: SINTOL

## 2021-06-13 NOTE — ED PROVIDER NOTES
HPI   52-year-old male presents with left hand pain after fall on Monday out of a truck. Patient states he was seen in regular doctors on Tuesday and diagnosed with a fracture who was thumb bone. He states he was placed in a splint but has gotten the splint wet and had to take it off. He is complaining of increased pain. He states he has a follow-up appointment tomorrow at the [facility]. He denies any new injury. He denies weakness or numbness. Pain worse with range of motion.   Past Medical History:   Diagnosis Date    CAD (coronary artery disease)     Diabetes ([facility] [address]     \"prediabetes\" Was suppos to go clinic at  [facility] [address] Endocarditis     History of cardiac cath     Hypertension     MI (myocardial infarction) ([facility] [address]        Past Surgical History:   Procedure Laterality Date    [place] CARDIAC SUR[place] PROCEDURE [place]      cath with stent placement 2012    [place] CARDIAC SUR[place] PROCEDURE [place]LIST      CABGx4         Family History:   Problem Relation Age of Onset    Stroke Father        Social History     Socioeconomic History    Marital status: SINGLE     Spouse name: Not on file    Number of children: Not on file    Years of education: Not on file    Highest education level: Not on file   Occupational History    Not on file   Tobacco Use    Smoking status: Current Every Day Smoker     Packs/day: 0.25     Last attempt to quit: 2012     Years since quittin.4    Smokeless tobacco: Never Used   Substance and Sexual Activity    Alcohol use: Yes     Comment: occassionl    Drug use: Yes     Types: Marijuana     Comment: last used two weeks ago     Sexual activity: Not Currently     Partners: Female   Other Topics Concern    Not on file   Social History Narrative    Not on file     Social Determinants of Health     Financial Resource Strain:     Difficulty of Paying Living Expenses:    Food Insecurity:     Worried About Running Out of Food in the Last Year:     Sarabjit of Food in the Last Year: Transportation Needs:     Lack of Transportation (Medical):  Lack of Transportation (Non-Medical):    Physical Activity:     Days of Exercise per Week:     Minutes of Exercise per Session:    Stress:     Feeling of Stress :    Social Connections:     Frequency of Communication with Friends and Family:     Frequency of Social Gatherings with Friends and Family:     Attends Baptism Services:     Active Member of Clubs or Organizations:     Attends Club or Organization Meetings:     Marital Status:    Intimate Partner Violence:     Fear of Current or Ex-Partner:     Emotionally Abused:     Physically Abused:     Sexually Abused: ALLERGIES: Patient has no known allergies. Review of Systems   Constitutional: Negative for chills and fever. Respiratory: Negative for cough and shortness of breath. Cardiovascular: Negative for chest pain. Musculoskeletal: Positive for arthralgias and joint swelling. Skin: Positive for color change. Negative for rash and wound. Neurological: Negative for weakness and numbness. All other systems reviewed and are negative.       Vitals:    06/13/21 0627 06/13/21 0630   BP: (!) 160/91    Pulse: (!) 101 99   Resp: 20    Temp: 98.4 °F (36.9 °C)    SpO2: 98%             Physical Exam   Physical Examination: General appearance - alert, well appearing, and in no distress, oriented to person, place, and time and normal appearing weight  Neurological - alert, oriented, normal speech, no focal findings or movement disorder noted  Musculoskeletal -tenderness and bruising over thenar eminence, decreased range of motion of thumb due to pain, mild erythema over posterior second and third fingers of left hand, neurovascularly intact with strong radial pulse and cap refill less than 2 seconds  Extremities - peripheral pulses normal, no pedal edema, no clubbing or cyanosis  Skin - normal coloration and turgor, no rashes, no suspicious skin lesions noted  MDM  Number of Diagnoses or Management Options     Amount and/or Complexity of Data Reviewed  Tests in the radiology section of CPT®: ordered and reviewed  Decide to obtain previous medical records or to obtain history from someone other than the patient: yes  Review and summarize past medical records: yes  Independent visualization of images, tracings, or specimens: yes    Patient Progress  Patient progress: stable         Procedures  Patient does not have records from his regular doctors. Will repeat x-ray and reevaluate. Sharp base of first metacarpal of left hand. Will patient in thumb spica splint and follow-up with Ortho.    7:00 AM  Pt signed out to Dr. Tsering Brown pending evaluation after splint placement.

## 2021-06-13 NOTE — ED TRIAGE NOTES
Pt arrives from home with CC of L hand pain, he was seen at [facility] on Tuesday for L hand pain after a fall and was diagnosed with a broken hand/L thumb and was told to go to an orthopedic specialist but he states he was in so much pain he needed to be seen here prior to the [facility] or the [place].

## 2021-08-03 PROBLEM — R07.9 CHEST PAIN: Status: RESOLVED | Noted: 2020-03-13 | Resolved: 2021-08-03

## 2022-03-18 PROBLEM — I21.4 NSTEMI (NON-ST ELEVATED MYOCARDIAL INFARCTION) (HCC): Status: ACTIVE | Noted: 2017-03-18

## 2022-03-19 PROBLEM — E11.65 TYPE 2 DIABETES MELLITUS WITH HYPERGLYCEMIA (HCC): Status: ACTIVE | Noted: 2018-08-08

## 2022-03-19 PROBLEM — E86.0 DEHYDRATION: Status: ACTIVE | Noted: 2018-08-08

## 2022-03-19 PROBLEM — R07.9 ACUTE CHEST PAIN: Status: ACTIVE | Noted: 2018-08-08

## 2023-05-11 RX ORDER — ASPIRIN 81 MG/1
81 TABLET, CHEWABLE ORAL DAILY
COMMUNITY

## 2023-05-11 RX ORDER — ISOSORBIDE MONONITRATE 30 MG/1
TABLET, EXTENDED RELEASE ORAL DAILY
COMMUNITY
Start: 2020-03-13

## 2023-05-11 RX ORDER — FOLIC ACID 1 MG/1
1 TABLET ORAL DAILY
COMMUNITY

## 2023-05-11 RX ORDER — NITROGLYCERIN 0.4 MG/1
TABLET SUBLINGUAL
COMMUNITY

## 2023-05-11 RX ORDER — SERTRALINE HYDROCHLORIDE 100 MG/1
TABLET, FILM COATED ORAL DAILY
COMMUNITY

## 2023-05-11 RX ORDER — METOPROLOL SUCCINATE 50 MG/1
TABLET, EXTENDED RELEASE ORAL DAILY
COMMUNITY

## 2023-05-11 RX ORDER — LISINOPRIL 5 MG/1
TABLET ORAL DAILY
COMMUNITY

## 2023-05-11 RX ORDER — ROSUVASTATIN CALCIUM 40 MG/1
40 TABLET, COATED ORAL DAILY
COMMUNITY

## 2023-05-11 RX ORDER — INSULIN GLARGINE 100 [IU]/ML
INJECTION, SOLUTION SUBCUTANEOUS DAILY
COMMUNITY

## (undated) DEVICE — KIT MFLD ISOLATN NACL CNTRST PRT TBNG SPIK W/ PRSS TRNSDUC

## (undated) DEVICE — PINNACLE INTRODUCER SHEATH: Brand: PINNACLE

## (undated) DEVICE — HI-TORQUE FLOPPY II EXTRA SUPPORT GUIDEWIRE .014 STRAIGHT TIP 2.0 CM X 300 CM: Brand: HI-TORQUE FLOPPY

## (undated) DEVICE — KIT MED IMAG CNTRST AGNT W/ IOPAMIDOL REUSE

## (undated) DEVICE — HI-TORQUE VERSACORE MODIFIED J GUIDE WIRE SYSTEM 145 CM: Brand: HI-TORQUE VERSACORE

## (undated) DEVICE — ANGIOGRAPHY KIT

## (undated) DEVICE — PACK PROCEDURE SURG HRT CATH

## (undated) DEVICE — CATH GUID COR AL10 6FR 100CM -- LAUNCHER

## (undated) DEVICE — PROCEDURE KIT FLUID MGMT CUST MAINFOLD STRL

## (undated) DEVICE — CATHETER PTCA L138CM BLLN L15MM DIA2.5MM CROSSING PROF

## (undated) DEVICE — ANGIO-SEAL VIP VASCULAR CLOSURE DEVICE: Brand: ANGIO-SEAL

## (undated) DEVICE — HI-TORQUE WHISPER MS GUIDE WIRE .014 STRAIGHT TIP 3.0 CM X 300 CM: Brand: HI-TORQUE WHISPER

## (undated) DEVICE — CATH GUID COR LCB 6FR 100CM -- LAUNCHER

## (undated) DEVICE — ANGIOGRAPHIC CATHETER: Brand: IMPULSE™

## (undated) DEVICE — CATHETER BLLN SPRINTER OTW 2MMX12MM 138CM

## (undated) DEVICE — KIT HND CTRL 3 W STPCOCK ROT END 54IN PREM HI PRSS TBNG AT

## (undated) DEVICE — CATHETER BLLN  SPRINTER 138CM 12MM DIA1.5MM CROSSING PROF